# Patient Record
Sex: MALE
[De-identification: names, ages, dates, MRNs, and addresses within clinical notes are randomized per-mention and may not be internally consistent; named-entity substitution may affect disease eponyms.]

---

## 2017-12-27 ENCOUNTER — TRANSCRIPTION ENCOUNTER (OUTPATIENT)
Age: 62
End: 2017-12-27

## 2021-09-09 ENCOUNTER — APPOINTMENT (OUTPATIENT)
Dept: ORTHOPEDIC SURGERY | Facility: CLINIC | Age: 66
End: 2021-09-09

## 2024-11-13 ENCOUNTER — INPATIENT (INPATIENT)
Facility: HOSPITAL | Age: 69
LOS: 21 days | Discharge: OTHER INSTITUTION | DRG: 66 | End: 2024-12-05
Attending: STUDENT IN AN ORGANIZED HEALTH CARE EDUCATION/TRAINING PROGRAM | Admitting: STUDENT IN AN ORGANIZED HEALTH CARE EDUCATION/TRAINING PROGRAM
Payer: MEDICARE

## 2024-11-13 VITALS
OXYGEN SATURATION: 98 % | WEIGHT: 224.87 LBS | HEIGHT: 74 IN | RESPIRATION RATE: 15 BRPM | HEART RATE: 56 BPM | SYSTOLIC BLOOD PRESSURE: 129 MMHG | DIASTOLIC BLOOD PRESSURE: 78 MMHG | TEMPERATURE: 98 F

## 2024-11-13 DIAGNOSIS — Z90.49 ACQUIRED ABSENCE OF OTHER SPECIFIED PARTS OF DIGESTIVE TRACT: Chronic | ICD-10-CM

## 2024-11-13 DIAGNOSIS — Z98.890 OTHER SPECIFIED POSTPROCEDURAL STATES: Chronic | ICD-10-CM

## 2024-11-13 DIAGNOSIS — I63.9 CEREBRAL INFARCTION, UNSPECIFIED: ICD-10-CM

## 2024-11-13 DIAGNOSIS — Z96.649 PRESENCE OF UNSPECIFIED ARTIFICIAL HIP JOINT: Chronic | ICD-10-CM

## 2024-11-13 DIAGNOSIS — Z98.49 CATARACT EXTRACTION STATUS, UNSPECIFIED EYE: Chronic | ICD-10-CM

## 2024-11-13 LAB — SARS-COV-2 RNA SPEC QL NAA+PROBE: SIGNIFICANT CHANGE UP

## 2024-11-13 RX ORDER — AMLODIPINE BESYLATE 10 MG/1
5 TABLET ORAL DAILY
Refills: 0 | Status: DISCONTINUED | OUTPATIENT
Start: 2024-11-14 | End: 2024-11-22

## 2024-11-13 RX ORDER — POLYETHYLENE GLYCOL 3350 17 G/17G
17 POWDER, FOR SOLUTION ORAL
Refills: 0 | Status: DISCONTINUED | OUTPATIENT
Start: 2024-11-13 | End: 2024-11-25

## 2024-11-13 RX ORDER — CLOPIDOGREL 75 MG/1
75 TABLET, FILM COATED ORAL DAILY
Refills: 0 | Status: DISCONTINUED | OUTPATIENT
Start: 2024-11-14 | End: 2024-12-05

## 2024-11-13 RX ORDER — ACETAMINOPHEN, DIPHENHYDRAMINE HCL, PHENYLEPHRINE HCL 325; 25; 5 MG/1; MG/1; MG/1
6 TABLET ORAL AT BEDTIME
Refills: 0 | Status: DISCONTINUED | OUTPATIENT
Start: 2024-11-13 | End: 2024-11-15

## 2024-11-13 RX ORDER — PANTOPRAZOLE SODIUM 40 MG/1
40 TABLET, DELAYED RELEASE ORAL
Refills: 0 | Status: DISCONTINUED | OUTPATIENT
Start: 2024-11-13 | End: 2024-12-05

## 2024-11-13 RX ORDER — POLYETHYLENE GLYCOL 3350 17 G/17G
17 POWDER, FOR SOLUTION ORAL DAILY
Refills: 0 | Status: DISCONTINUED | OUTPATIENT
Start: 2024-11-13 | End: 2024-11-14

## 2024-11-13 RX ORDER — CHOLECALCIFEROL (VITAMIN D3) 10MCG/0.25
1000 DROPS ORAL DAILY
Refills: 0 | Status: DISCONTINUED | OUTPATIENT
Start: 2024-11-13 | End: 2024-12-05

## 2024-11-13 RX ORDER — ALBUTEROL 90 MCG
2 AEROSOL (GRAM) INHALATION EVERY 6 HOURS
Refills: 0 | Status: DISCONTINUED | OUTPATIENT
Start: 2024-11-13 | End: 2024-11-15

## 2024-11-13 RX ORDER — ROSUVASTATIN CALCIUM 5 MG/1
5 TABLET, FILM COATED ORAL AT BEDTIME
Refills: 0 | Status: DISCONTINUED | OUTPATIENT
Start: 2024-11-13 | End: 2024-11-13

## 2024-11-13 RX ORDER — PAROXETINE HYDROCHLORIDE HEMIHYDRATE 37.5 MG/1
30 TABLET, FILM COATED, EXTENDED RELEASE ORAL DAILY
Refills: 0 | Status: DISCONTINUED | OUTPATIENT
Start: 2024-11-14 | End: 2024-12-05

## 2024-11-13 RX ORDER — LOSARTAN POTASSIUM 100 MG/1
50 TABLET, FILM COATED ORAL DAILY
Refills: 0 | Status: DISCONTINUED | OUTPATIENT
Start: 2024-11-14 | End: 2024-11-22

## 2024-11-13 RX ORDER — SENNOSIDES 8.6 MG
2 TABLET ORAL AT BEDTIME
Refills: 0 | Status: DISCONTINUED | OUTPATIENT
Start: 2024-11-13 | End: 2024-11-19

## 2024-11-13 RX ORDER — ACETAMINOPHEN 500MG 500 MG/1
650 TABLET, COATED ORAL EVERY 6 HOURS
Refills: 0 | Status: DISCONTINUED | OUTPATIENT
Start: 2024-11-13 | End: 2024-12-05

## 2024-11-13 RX ORDER — COLCHICINE 0.6 MG
0.6 TABLET ORAL DAILY
Refills: 0 | Status: DISCONTINUED | OUTPATIENT
Start: 2024-11-14 | End: 2024-12-05

## 2024-11-13 RX ORDER — ROSUVASTATIN CALCIUM 5 MG/1
5 TABLET, FILM COATED ORAL DAILY
Refills: 0 | Status: DISCONTINUED | OUTPATIENT
Start: 2024-11-14 | End: 2024-12-05

## 2024-11-13 RX ORDER — PANTOPRAZOLE SODIUM 40 MG/1
40 TABLET, DELAYED RELEASE ORAL
Refills: 0 | Status: DISCONTINUED | OUTPATIENT
Start: 2024-11-13 | End: 2024-11-13

## 2024-11-13 NOTE — H&P ADULT - NSICDXFAMILYHX_GEN_ALL_CORE_FT
FAMILY HISTORY:  Father  Still living? Unknown  FH: arthritis, Age at diagnosis: Age Unknown  FH: cancer, Age at diagnosis: Age Unknown    Mother  Still living? Unknown  FH: arthritis, Age at diagnosis: Age Unknown  FH: cancer, Age at diagnosis: Age Unknown  FH: depression, Age at diagnosis: Age Unknown

## 2024-11-13 NOTE — H&P ADULT - ASSESSMENT
Assessment/Plan:  TARUN CRESPO is a 70 y/o M w/ PMHx HLD, depression, GERD, gout, colonic polyps,  congenital absence of one kidney, recent cholecystitis s/p cholecystectomy (6/24), RHETT on CPAP [facility]inially [facility] with [facility]asia, R sided weakness and R facial droop and vigorous cough x 2 weeks s/p COVID vaccine. 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  S/p L ICA terminus occulusion a/p L ICA thrombectomy, no stent, 5 passes TICI 2C  w/ underling L petrous ICA dissection on 11/3 w/ Dr. March. Now admitted to [facility] for functional deficits for initiation of a multidisciplinary rehab program consisting focused on functional mobility, transfers and ADLs.    #L MCA stroke w/ hemorrhagic conversion  #L petrous ICA dissection w/ pesudoaneurysm  -CTH 11/3: hyperdense appearance of L basal ganglia and L frontal lobe gyri, mild LCA edema 2/2 recent ischemia  -CTA H/N 11/3: no evidence of significant stenosis affecting the extra cranial carotid or vertebral arteries. No evidence of aneurysm or significant vascular stenosis at [place]  -Repeat CTH 11/4: post recent endovascular revascularization of the left internal carotid artery and left middle cerebral artery. Minimally less conspicuous hyperdense appearance of left basal ganglia and left frontal lobe gyri which may represent expected evolution of contrast staining  -MRI brain 11/5: evolving recent infarct in L ICA associated with hemorrhagic transformation of the L basal ganglia. Effacement of cerebral sulci with mass effect causing 0.6 mm rightward shift.   -CTH 11/9: evolving acute to subacute left MCA infarct with evolving left basal ganglia hemorrhagic transformation, new punctuate foci of increased hyperdensity. Stable mass effect and 5mm rightward midline shift  -CTA 11/9: stable left internal carotid artery pseudoaneurysm immediately inferior to the petrous portion of the L ICA at the area of focal dissection.  -S/p L ICA terminus occulusion a/p L ICA thrombectomy, no stent, 5 passes TICI 2C  w/ underling L petrous ICA dissection on 11/3 w/ Dr. March.   -A1c 5.0; LDL 60  -No statin indicated given low LDL  -Aspirin 325mg daily  -Plavix 75mg daily   -F/u outpatient caridology for Zio-patch to r/o cardioembolic source     Deficits: Dysarthria, apraxia of speech, aphasia, right hemiparesis   Comprehensive Multidisciplinary Rehab Program:  - Start comprehensive rehab program, 3 hours a day, 5 days a week.  - PT 1hr/day: Focused on improving strength, endurance, coordination, balance, functional mobility, and transfers  - OT 1hr/day: Focused on improving strength, fine motor skills, coordination, posture and ADLs.    - Speech Therapy 1hr/day: to diagnose and treat deficits in swallowing, cognition and communication.   - Activity Limitations: Decreased social, vocational and leisure activities, decreased self care and ADLs, decreased mobility, decreased ability to manage household and finances.     -----------------------------------------------------------------------------------  Concurrent Medical Problems    #Normocytic anemia (stable) vs Iron deficiency anemia   -Monitor H/H (11.2/34.2- on 11/13)  -F/u outpatient colonoscopy for hx of colon polps  -Transfuse <7 PRN    -Iron studies: Transferrin: 11, TIBC: 170, Iron: 18, Ferritin: 414    #Fevers  #Pneumonitis   -Fevers on 11/5 ; infectious work up negative  -Likely 2/2 chemical pneumonitis from aspiration of ice chips  -Blood cx negative  -MRSA/MSSA- negative  -S/p empiric Rocephin x1  -Monitor CBC, fevers    #RHETT  #URI   #Cough   -CXR- negative   -CPAP HS  -Albuterol PRN  -Continue with o2 NC  -Monitor o2 sats    #Gout  -Colchicine 0.6mg daily  -Uric acid 11/12- 6.8     #Mood/Cognition  #Depression  -Paxil 30mg daily   Neuropsychology consult PRN    #Sleep:   Maintain quiet hours and low stim environment.  Melatonin 6mg HS PRN to maximize participation in therapy during the day.     #Pain Management:  Analgesic: Tylenol PRN    #GI/Bowel:  Senna QHS, Miralax PRN Daily  GI ppx: Pantoprazole 40mg daily     #/Bladder:   - PVR x1 on admission (SC if > 400)    #Skin/Pressure Injury:   - Skin assessment on admission: ***    #Diet/Dysphagia:  Dysphagia: SLP consult for swallow function evaluation and treatment  S/p MBS on 11/12--> diet: Easy to chew; mod thick liquids- DASH  -1:1 feeds   [X] Aspiration Precautions  Nutrition consult   -F/u outpatient ENT    #Precautions / PROPHYLAXIS:   - Falls,   - ortho: Weight bearing status: WBAT   - Lungs: Aspiration, Incentive Spirometer   - DVT ppx: Lovenox 40mg daily, SCDs  - Pressure injury/Skin:  OOB to Chair, PT/OT      ---------------  Code Status: FULL  Emergency Contact:    Outpatient Follow-up (Specialty/Name of physician):    --------------  Goals: Safe discharge to Home*****  Estimated Length of Stay: 10-14 days  Rehab Potential: Good  Medical Prognosis: Good  Estimated Disposition: Home with Home Care  ---------------    PRESCREEN COMPARISON:  I have reviewed the prescreen information and I have found no relevant changes between the preadmission screening and my post admission evaluation.    RATIONALE FOR INPATIENT ADMISSION: Patient demonstrates the following:  [X] Medically appropriate for rehabilitation admission  [X] Has attainable rehab goals with an appropriate initial discharge plan  [X]Has rehabilitation potential (expected to make a significant improvement within a reasonable period of time)  [X] Requires close medical management by a rehab physician, rehab nursing care, Hospitalist and comprehensive interdisciplinary team (including PT, OT and/or SLP, Prosthetics and Orthotics)       Assessment/Plan:  TARUN CRESPO is a 68 y/o M w/ PMHx HLD, depression, GERD, gout, colonic polyps,  congenital absence of one kidney, recent cholecystitis s/p cholecystectomy (6/24), RHETT on CPAP orginially presented to [facility] on 11/3 with aphasia, R sided weakness and R facial droop and vigorous cough x 2 weeks s/p COVID vaccine. 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  S/p L ICA terminus occulusion a/p L ICA thrombectomy, no stent, 5 passes TICI 2C  w/ underling L petrous ICA dissection on 11/3 w/ Dr. March. Now admitted to [facility] for functional deficits for initiation of a multidisciplinary rehab program consisting focused on functional mobility, transfers and ADLs.    #L MCA stroke w/ hemorrhagic conversion  #L petrous ICA dissection w/ pesudoaneurysm  -CTH 11/3: hyperdense appearance of L basal ganglia and L frontal lobe gyri, mild LCA edema 2/2 recent ischemia  -CTA H/N 11/3: no evidence of significant stenosis affecting the extra cranial carotid or vertebral arteries. No evidence of aneurysm or significant vascular stenosis at [place]  -Repeat CTH 11/4: post recent endovascular revascularization of the left internal carotid artery and left middle cerebral artery. Minimally less conspicuous hyperdense appearance of left basal ganglia and left frontal lobe gyri which may represent expected evolution of contrast staining  -MRI brain 11/5: evolving recent infarct in L ICA associated with hemorrhagic transformation of the L basal ganglia. Effacement of cerebral sulci with mass effect causing 0.6 mm rightward shift.   -CTH 11/9: evolving acute to subacute left MCA infarct with evolving left basal ganglia hemorrhagic transformation, new punctuate foci of increased hyperdensity. Stable mass effect and 5mm rightward midline shift  -CTA 11/9: stable left internal carotid artery pseudoaneurysm immediately inferior to the petrous portion of the L ICA at the area of focal dissection.  -S/p L ICA terminus occulusion a/p L ICA thrombectomy, no stent, 5 passes TICI 2C  w/ underling L petrous ICA dissection on 11/3 w/ Dr. March.   -A1c 5.0; LDL 60  -No statin indicated given low LDL  -Aspirin 325mg daily  -Plavix 75mg daily   rosuvastatin 5mg QD  -Will be on DAPT for 3 months and follow up with outpt neurology  -F/u outpatient caridology for Zio-patch to r/o cardioembolic source --ILR to be considered as outpatient  -CTH reports with screen grabs in chart; CD imaging from [place] in chart and will need to uploaded to [facility]    Deficits: Dysarthria, apraxia of speech, aphasia, right hemiparesis   Comprehensive Multidisciplinary Rehab Program:  - Start comprehensive rehab program, 3 hours a day, 5 days a week.  - PT 1hr/day: Focused on improving strength, endurance, coordination, balance, functional mobility, and transfers  - OT 1hr/day: Focused on improving strength, fine motor skills, coordination, posture and ADLs.    - Speech Therapy 1hr/day: to diagnose and treat deficits in swallowing, cognition and communication.   - Activity Limitations: Decreased social, vocational and leisure activities, decreased self care and ADLs, decreased mobility, decreased ability to manage household and finances.   -Right resting hand splint 4hrs on/off during the day and on at night as tolerated    -----------------------------------------------------------------------------------  Concurrent Medical Problems    #Normocytic anemia (stable) vs Iron deficiency anemia   -Monitor H/H (11.2/34.2- on 11/13)  -F/u outpatient colonoscopy for hx of colon polps  -Transfuse <7 PRN    -Iron studies: Transferrin: 11, TIBC: 170, Iron: 18, Ferritin: 414    #Fevers  #Pneumonitis   -Fevers on 11/5 ; infectious work up negative  -Likely 2/2 chemical pneumonitis from aspiration of ice chips  -Blood cx negative  -MRSA/MSSA- negative  -S/p empiric Rocephin x1  -Monitor CBC, fevers    #RHETT  #URI   #Cough   -CXR- negative   -CPAP HS --unknown settings will need to confirm with wife  -Albuterol PRN  -Continue with o2 NC  -Monitor o2 sats    #Gout  -Colchicine 0.6mg daily  -Uric acid 11/12- 6.8     #Mood/Cognition  #Depression  -Paxil 30mg daily   Neuropsychology consult PRN    #Sleep:   Maintain quiet hours and low stim environment.  Melatonin 6mg HS PRN to maximize participation in therapy during the day.     #Pain Management:  Analgesic: Tylenol PRN    #GI/Bowel:  -Senna QHS  -Miralax BIDD  - Bisacodyl suppository QD PRN  GI ppx: Pantoprazole 40mg daily     #/Bladder:   - PVR x1 on admission (SC if > 400)    #Skin/Pressure Injury:   - Skin assessment on admission: intact    #Diet/Dysphagia:  Dysphagia: SLP consult for swallow function evaluation and treatment  S/p MBS on 11/12--> diet: Easy to chew; mild thick liquids- DASH  -1:1 feeds   [X] Aspiration Precautions  Nutrition consult   -F/u outpatient ENT    #Precautions / PROPHYLAXIS:   - Falls,   - Lungs: Aspiration, Incentive Spirometer   - DVT ppx: DAPT  - Pressure injury/Skin:  OOB to Chair, PT/OT      ---------------  Code Status: FULL  Emergency Contact:    Outpatient Follow-up (Specialty/Name of physician):  Dr. Cheng March  Neurology  12/24/24 appt     Cardiology    ENT  [phone]  --------------  Goals: Safe discharge to Home*****  Estimated Length of Stay: 10-14 days  Rehab Potential: Good  Medical Prognosis: Good  Estimated Disposition: Home with Home Care  ---------------    PRESCREEN COMPARISON:  I have reviewed the prescreen information and I have found no relevant changes between the preadmission screening and my post admission evaluation.    RATIONALE FOR INPATIENT ADMISSION: Patient demonstrates the following:  [X] Medically appropriate for rehabilitation admission  [X] Has attainable rehab goals with an appropriate initial discharge plan  [X]Has rehabilitation potential (expected to make a significant improvement within a reasonable period of time)  [X] Requires close medical management by a rehab physician, rehab nursing care, Hospitalist and comprehensive interdisciplinary team (including PT, OT and/or SLP, Prosthetics and Orthotics)       Assessment/Plan:  TARUN CRESPO is a 70 y/o M w/ PMHx HLD, depression, GERD, gout, colonic polyps,  congenital absence of one kidney, recent cholecystitis s/p cholecystectomy (6/24), RHETT on CPAP orginially presented to [facility] on 11/3 with aphasia, R sided weakness and R facial droop and vigorous cough x 2 weeks s/p COVID vaccine. 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  S/p L ICA terminus occulusion a/p L ICA thrombectomy, no stent, 5 passes TICI 2C  w/ underling L petrous ICA dissection on 11/3 w/ Dr. March. Now admitted to [facility] for functional deficits for initiation of a multidisciplinary rehab program consisting focused on functional mobility, transfers and ADLs.    #L MCA stroke w/ hemorrhagic conversion  #L petrous ICA dissection w/ pesudoaneurysm  -CTH 11/3: hyperdense appearance of L basal ganglia and L frontal lobe gyri, mild LCA edema 2/2 recent ischemia  -CTA H/N 11/3: no evidence of significant stenosis affecting the extra cranial carotid or vertebral arteries. No evidence of aneurysm or significant vascular stenosis at [place]  -Repeat CTH 11/4: post recent endovascular revascularization of the left internal carotid artery and left middle cerebral artery. Minimally less conspicuous hyperdense appearance of left basal ganglia and left frontal lobe gyri which may represent expected evolution of contrast staining  -MRI brain 11/5: evolving recent infarct in L ICA associated with hemorrhagic transformation of the L basal ganglia. Effacement of cerebral sulci with mass effect causing 0.6 mm rightward shift.   -CTH 11/9: evolving acute to subacute left MCA infarct with evolving left basal ganglia hemorrhagic transformation, new punctuate foci of increased hyperdensity. Stable mass effect and 5mm rightward midline shift  -CTA 11/9: stable left internal carotid artery pseudoaneurysm immediately inferior to the petrous portion of the L ICA at the area of focal dissection.  -S/p L ICA terminus occulusion a/p L ICA thrombectomy, no stent, 5 passes TICI 2C  w/ underling L petrous ICA dissection on 11/3 w/ Dr. March.   -A1c 5.0; LDL 60  -No statin indicated given low LDL  -Aspirin 325mg daily  -Plavix 75mg daily   rosuvastatin 5mg QD  -Will be on DAPT for 3 months and follow up with outpt neurology  -F/u outpatient caridology for Zio-patch to r/o cardioembolic source --ILR to be considered as outpatient  -CTH reports with screen grabs in chart; CD imaging from [place] in chart and will need to uploaded to [facility]    Deficits: Dysarthria, apraxia of speech, aphasia, right hemiparesis   Comprehensive Multidisciplinary Rehab Program:  - Start comprehensive rehab program, 3 hours a day, 5 days a week.  - PT 1hr/day: Focused on improving strength, endurance, coordination, balance, functional mobility, and transfers  - OT 1hr/day: Focused on improving strength, fine motor skills, coordination, posture and ADLs.    - Speech Therapy 1hr/day: to diagnose and treat deficits in swallowing, cognition and communication.   - Activity Limitations: Decreased social, vocational and leisure activities, decreased self care and ADLs, decreased mobility, decreased ability to manage household and finances.   -Right resting hand splint 4hrs on/off during the day and on at night as tolerated    -----------------------------------------------------------------------------------  Concurrent Medical Problems    #Normocytic anemia (stable) vs Iron deficiency anemia   -Monitor H/H (11.2/34.2- on 11/13)  -F/u outpatient colonoscopy for hx of colon polps  -Transfuse <7 PRN    -Iron studies: Transferrin: 11, TIBC: 170, Iron: 18, Ferritin: 414    #Fevers  #Pneumonitis   -Fevers on 11/5 ; infectious work up negative  -Likely 2/2 chemical pneumonitis from aspiration of ice chips  -Blood cx negative  -MRSA/MSSA- negative  -S/p empiric Rocephin x1  -Monitor CBC, fevers    #RHETT  #URI   #Cough   -CXR- negative   -CPAP HS --unknown settings will need to confirm with wife  -Albuterol PRN  -On Flovent at home (not available here)  -Continue with o2 NC  -Monitor o2 sats    #Gout  -Colchicine 0.6mg daily  -Uric acid 11/12- 6.8     #Mood/Cognition  #Depression  -Paxil 30mg daily   Neuropsychology consult PRN    #Sleep:   Maintain quiet hours and low stim environment.  Melatonin 6mg HS PRN to maximize participation in therapy during the day.     #Pain Management:  Analgesic: Tylenol PRN    #GI/Bowel:  -Senna QHS  -Miralax BIDD  - Bisacodyl suppository QD PRN  GI ppx: Pantoprazole 40mg daily     #/Bladder:   - PVR x1 on admission (SC if > 400)    #Skin/Pressure Injury:   - Skin assessment on admission: intact    #Diet/Dysphagia:  Dysphagia: SLP consult for swallow function evaluation and treatment  S/p MBS on 11/12--> diet: Easy to chew; mild thick liquids- DASH  -1:1 feeds   [X] Aspiration Precautions  Nutrition consult   -F/u outpatient ENT    #Precautions / PROPHYLAXIS:   - Falls,   - Lungs: Aspiration, Incentive Spirometer   - DVT ppx: DAPT  - Pressure injury/Skin:  OOB to Chair, PT/OT      ---------------  Code Status: FULL  Emergency Contact:    Outpatient Follow-up (Specialty/Name of physician):  Dr. Cheng March  Neurology  12/24/24 appt     Cardiology    ENT  [phone]  --------------  Goals: Safe discharge to Home*****  Estimated Length of Stay: 10-14 days  Rehab Potential: Good  Medical Prognosis: Good  Estimated Disposition: Home with Home Care  ---------------    PRESCREEN COMPARISON:  I have reviewed the prescreen information and I have found no relevant changes between the preadmission screening and my post admission evaluation.    RATIONALE FOR INPATIENT ADMISSION: Patient demonstrates the following:  [X] Medically appropriate for rehabilitation admission  [X] Has attainable rehab goals with an appropriate initial discharge plan  [X]Has rehabilitation potential (expected to make a significant improvement within a reasonable period of time)  [X] Requires close medical management by a rehab physician, rehab nursing care, Hospitalist and comprehensive interdisciplinary team (including PT, OT and/or SLP, Prosthetics and Orthotics)       Assessment/Plan:  TARUN CRESPO is a 70 y/o M w/ PMHx HLD, depression, GERD, gout, colonic polyps,  congenital absence of one kidney, recent cholecystitis s/p cholecystectomy (6/24), RHETT on CPAP orginially presented to [facility] on 11/3 with aphasia, R sided weakness and R facial droop and vigorous cough x 2 weeks s/p COVID vaccine. 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  S/p L ICA terminus occulusion a/p L ICA thrombectomy, no stent, 5 passes TICI 2C  w/ underling L petrous ICA dissection on 11/3 w/ Dr. March. Now admitted to [facility] for functional deficits for initiation of a multidisciplinary rehab program with right hemiparesis, non-fluent aphasia, sensory impairment, dysphagia, and impaired functional mobility, transfers and ADLs.    #L MCA stroke w/ hemorrhagic conversion  #L petrous ICA dissection w/ pesudoaneurysm  -CTH 11/3: hyperdense appearance of L basal ganglia and L frontal lobe gyri, mild LCA edema 2/2 recent ischemia  -CTA H/N 11/3: no evidence of significant stenosis affecting the extra cranial carotid or vertebral arteries. No evidence of aneurysm or significant vascular stenosis at [place]  -Repeat CTH 11/4: post recent endovascular revascularization of the left internal carotid artery and left middle cerebral artery. Minimally less conspicuous hyperdense appearance of left basal ganglia and left frontal lobe gyri which may represent expected evolution of contrast staining  -MRI brain 11/5: evolving recent infarct in L ICA associated with hemorrhagic transformation of the L basal ganglia. Effacement of cerebral sulci with mass effect causing 0.6 mm rightward shift.   -CTH 11/9: evolving acute to subacute left MCA infarct with evolving left basal ganglia hemorrhagic transformation, new punctuate foci of increased hyperdensity. Stable mass effect and 5mm rightward midline shift  -CTA 11/9: stable left internal carotid artery pseudoaneurysm immediately inferior to the petrous portion of the L ICA at the area of focal dissection.  -S/p L ICA terminus occulusion a/p L ICA thrombectomy, no stent, 5 passes TICI 2C  w/ underling L petrous ICA dissection on 11/3 w/ Dr. March.   -A1c 5.0; LDL 60  -No statin indicated given low LDL  -Aspirin 325mg daily  -Plavix 75mg daily   rosuvastatin 5mg QD  -Will be on DAPT for 3 months and follow up with outpt neurology  -F/u outpatient caridology for Zio-patch to r/o cardioembolic source --ILR to be considered as outpatient  -CTH reports with screen grabs in chart; CD imaging from [place] in chart and will need to uploaded to [facility]    Deficits: Dysarthria, apraxia of speech, aphasia, right hemiparesis   Comprehensive Multidisciplinary Rehab Program:  - Start comprehensive rehab program, 3 hours a day, 5 days a week.  - PT 1hr/day: Focused on improving strength, endurance, coordination, balance, functional mobility, and transfers  - OT 1hr/day: Focused on improving strength, fine motor skills, coordination, posture and ADLs.    - Speech Therapy 1hr/day: to diagnose and treat deficits in swallowing, cognition and communication.   - Activity Limitations: Decreased social, vocational and leisure activities, decreased self care and ADLs, decreased mobility, decreased ability to manage household and finances.   -Right resting hand splint 4hrs on/off during the day and on at night as tolerated    -----------------------------------------------------------------------------------  Concurrent Medical Problems    #Normocytic anemia (stable) vs Iron deficiency anemia   -Monitor H/H (11.2/34.2- on 11/13)  -F/u outpatient colonoscopy for hx of colon polps  -Transfuse <7 PRN    -Iron studies: Transferrin: 11, TIBC: 170, Iron: 18, Ferritin: 414    #Fevers  #Pneumonitis   -Fevers on 11/5 ; infectious work up negative  -Likely 2/2 chemical pneumonitis from aspiration of ice chips  -Blood cx negative  -MRSA/MSSA- negative  -S/p empiric Rocephin x1  -Monitor CBC, fevers    #RHETT  #URI   #Cough   -CXR- negative   -CPAP HS --unknown settings will need to confirm with wife  -Albuterol PRN  -On Flovent at home (not available here)  -Continue with o2 NC  -Monitor o2 sats    #Gout  -Colchicine 0.6mg daily  -Uric acid 11/12- 6.8     #Mood/Cognition  #Depression  -Paxil 30mg daily   Neuropsychology consult PRN    #Sleep:   Maintain quiet hours and low stim environment.  Melatonin 6mg HS PRN to maximize participation in therapy during the day.     #Pain Management:  Analgesic: Tylenol PRN    #GI/Bowel:  -Senna QHS  -Miralax BIDD  - Bisacodyl suppository QD PRN  GI ppx: Pantoprazole 40mg daily     #/Bladder:   - PVR x1 on admission (SC if > 400)    #Skin/Pressure Injury:   - Skin assessment on admission: intact    #Dysphagia:  Dysphagia: SLP consult for swallow function evaluation and treatment  S/p MBS on 11/12--> diet: Easy to chew; mild thick liquids- DASH  -1:1 feeds   [X] Aspiration Precautions  Nutrition consult   -F/u outpatient ENT    #Precautions / PROPHYLAXIS:   - Falls,   - Lungs: Aspiration, Incentive Spirometer   - DVT ppx: DAPT  - Pressure injury/Skin:  OOB to Chair, PT/OT      ---------------  Code Status: FULL  Emergency Contact:    Outpatient Follow-up (Specialty/Name of physician):  Dr. Cheng March  Neurology  12/24/24 appt     Cardiology    ENT  [phone]  --------------  Goals: Safe discharge to Home vs. TREV  Estimated Length of Stay:  21 days  Rehab Potential: Good  Medical Prognosis: Good  Estimated Disposition: Home with Home Care  ---------------    PRESCREEN COMPARISON:  I have reviewed the prescreen information and I have found no relevant changes between the preadmission screening and my post admission evaluation.    RATIONALE FOR INPATIENT ADMISSION: Patient demonstrates the following:  [X] Medically appropriate for rehabilitation admission  [X] Has attainable rehab goals with an appropriate initial discharge plan  [X]Has rehabilitation potential (expected to make a significant improvement within a reasonable period of time)  [X] Requires close medical management by a rehab physician, rehab nursing care, Hospitalist and comprehensive interdisciplinary team (including PT, OT and/or SLP, Prosthetics and Orthotics)

## 2024-11-13 NOTE — H&P ADULT - NS ATTEND AMEND GEN_ALL_CORE FT
Pt. seen with NP 11/14 AM.  Agree with documentation above as per NP and resident on call with amendments made as appropriate. Patient medically stable. Appropriate for acute rehabilitation.    Seen this AM.  Pt. c/o SOB-- better with O2.  limited by aphasia but can use gestures for yes/no.  Indicates he used O2  at home.  Pt. slept during the night as per nursing.  No void. was SC for 800cc this AM.  Pt. says no when asked if he feels urge to urinate.     Vital Signs Last 24 Hrs  T(C): 36.8 (14 Nov 2024 07:41), Max: 36.8 (13 Nov 2024 21:34)  T(F): 98.2 (14 Nov 2024 07:41), Max: 98.3 (13 Nov 2024 21:34)  HR: 54 (14 Nov 2024 07:41) (54 - 60)  BP: 125/79 (14 Nov 2024 07:41) (125/79 - 129/78)  BP(mean): --  RR: 14 (14 Nov 2024 07:41) (14 - 15)  SpO2: 95% (14 Nov 2024 07:41) (95% - 98%)    Parameters below as of 14 Nov 2024 07:41  Patient On (Oxygen Delivery Method): room air    Physical Exam as amended above                          12.2   5.26  )-----------( 279      ( 14 Nov 2024 06:23 )             37.2   11-14    140  |  104  |  21  ----------------------------<  97  4.0   |  26  |  0.96    Ca    8.9      14 Nov 2024 06:23    TPro  6.8  /  Alb  2.7[L]  /  TBili  0.5  /  DBili  x   /  AST  37  /  ALT  29  /  AlkPhos  51  11-14        68 y/o M w/ PMHx HLD, depression, GERD, gout, colonic polyps,  congenital absence of one kidney, recent cholecystitis s/p cholecystectomy (6/24), RHETT on CPAP orginially presented to [facility] on 11/3 with aphasia, R sided weakness and R facial droop and vigorous cough x 2 weeks s/p COVID vaccine. 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  S/p L ICA terminus occulusion a/p L ICA thrombectomy, no stent, 5 passes TICI 2C  w/ underling L petrous ICA dissection on 11/3 w/ Dr. March. Now admitted to [facility] for functional deficits for initiation of a multidisciplinary rehab program with right hemiparesis, non-fluent aphasia, sensory impairment, dysphagia, and impaired functional mobility, transfers and ADLs.    Imaging from [facility] will be uploaded to PACs today and reviewed    -- Retention.  Last BM 11/13  Monitor for voiding.  Check PVRs-- SC > 400cc.  If little to no void in 6 hour period, check bladder scan PRN and SC if > 400cc    Check bilateral LE doppler to r/o DVT-- pt. at risk due to Hemiparesis and immobility    Pt. not on AC due to hemorrhagic conversion as per [facility]-- will review imaging -- if has been stable for 48-h at least no contraindication to chemical DVT ppx.

## 2024-11-13 NOTE — H&P ADULT - NSHPPHYSICALEXAM_GEN_ALL_CORE
Constitutional - NAD, Comfortable   HEENT - NCAT, EOMI   Neck - Supple, No limited ROM  Chest - good chest expansion, good respiratory effort, CTAB    Cardio - warm and well perfused, RRR, no murmur   Abdomen -  Soft, NTND   Extremities - No peripheral edema, No calf tenderness      Neurologic Exam:                       Cognitive -              Orientation: Awake, Alert, Orientation limited due to aphasia            Attention:  limited due to aphasia; can follow simple 1 step commands; 2 step commands with delayed processing            Memory: unable to test due to aphasia     Speech – + Aphasia--Nonfluent, Impaired multiple step command following, impaired repetition; + dysarthria,       Cranial Nerves – PERRLA , EOMI, + facial asymmetry; facial sensation impaired on right, + dysarthria, + dysphagia, Shoulder shrug impaired        Motor -                        LEFT    UE - ShAB 5/5, EF 5/5, EE 5/5, WE 5/5,  5/5                     RIGHT UE - ShAB 0/5, EF 0/5, EE 0/5, WE 0/5,  0/5                     LEFT    LE - HF 5/5, KE 5/5, DF 5/5, PF 5/5                     RIGHT LE - HF 1/5, KE 1/5, DF 0/5, PF 0/5        Coordination - FTN/HTS intact on the left; unable to assess due to weaknes son the right     Sensory – Impaired to LT  on right side;       Reflexes - DTR  2/ 4 , neg Soria's b/l,  Tone: Right EF MAS 1; Right EE MAS 1; Right WF 1    Psychiatric - Mood stable, Affect WNL   Skin on admission: intact Constitutional - NAD, Comfortable   HEENT - NCAT, EOMI   Neck - Supple, No limited ROM  Chest - good chest expansion, good respiratory effort, CTAB    Cardio - warm and well perfused, RRR, no murmur   Abdomen -  Soft, NTND   Extremities - No peripheral edema, No calf tenderness      Neurologic Exam:                       Cognitive -              Orientation: Awake, Alert, Orientation limited due to aphasia            Attention:  limited due to aphasia; can follow simple 1 step commands; 2 step commands with delayed processing            Memory: unable to test due to aphasia     Speech – + Aphasia--Nonfluent, Impaired multiple step command following, but able to follow all simple commands, impaired repetition; + dysarthria,       Cranial Nerves – PERRLA , EOMI, + left facial weakness; facial sensation impaired on right, + dysarthria, + dysphagia, Shoulder shrug impaired        Motor -                        LEFT    UE - ShAB 5/5, EF 5/5, EE 5/5, WE 5/5,  5/5                     RIGHT UE - ShAB 0/5, EF 0/5, EE 0/5, WE 0/5,  0/5                     LEFT    LE - HF 5/5, KE 5/5, DF 5/5, PF 5/5                     RIGHT LE - HF 0/5, KE 0/5, DF 0/5, PF 0/5        Coordination - FTN/HTS intact on the left; unable to assess due to weaknes son the right     Sensory – Impaired to LT  on right side;-- withdraws to pain Left LE but no response left UE     Reflexes - DTR  2/ 4 , neg Soria's b/l,  Tone: no increased tone    MSK: right shoulder PROM WNL, +sublux  Psychiatric - Mood stable, Affect WNL   Skin on admission: intact

## 2024-11-13 NOTE — PATIENT PROFILE ADULT - NSPROGENSOURCEINFO_GEN_A_NUR
patient unable to respond due to aphasia/patient/facility verbal report/health record/unable to respond

## 2024-11-13 NOTE — H&P ADULT - HISTORY OF PRESENT ILLNESS
68 y/o M w/ PMHx HLD, depression, GERD, gout, colonic polyps,  congenital absence of one kidney, recent cholecystitis s/p cholecystectomy (6/24), RHETT on CPAP orginially presented to [facility] on 11/3 with aphasia, R sided weakness and R facial droop and vigorous cough x 2 weeks s/p COVID vaccine. LKW 2045 on 11/2. 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 Patient transferred to [facility] for mechanical thrombectomy. S/p L ICA terminus occulusion a/p L ICA thrombectomy, no stent, 5 passes TICI 2C  w/ underling L petrous ICA dissection on 11/3 w/ Dr. March. Post op course c/b slight pseudoaneurysm enlargement w/ dissection distal embolisim into the terminal ICA seen on 11/4 CTA and hemorrhagic transformation of L MCA infarct. Of note, hospital course also significant for fevers, suspect aspiration pneumonitis treated with Rocephin x 1, and dysphagia requiring easy to chew,  with mod thick liquids. EP was consulted with concerns of cardioembolic source for infarct, recommended to f/u outpatient for Zio patch. Patient optimized and was evaluated by PM&R and therapy for functional deficits, gait/ADL impairments and acute rehabilitation was recommended. Patient was cleared for discharge to [facility]U on 11/13/24.  68 y/o M w/ PMHx HLD, depression, GERD, gout, colonic polyps,  congenital absence of one kidney, recent cholecystitis s/p cholecystectomy (6/24), RHETT on CPAP orginially presented to [facility] on 11/3 with aphasia, R sided weakness and R facial droop and vigorous cough x 2 weeks s/p COVID vaccine. LKW 2045 on 11/2. 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 [place] [facility] [place]. S/p L ICA terminus occulusion a/p L ICA thrombectomy, no stent, 5 passes TICI 2C  w/ underling L petrous ICA dissection on 11/3 w/ Dr. March. Post op course c/b slight pseudoaneurysm enlargement w/ dissection distal embolisim into the terminal ICA seen on 11/4 CTA and hemorrhagic transformation of L MCA infarct.   Of note, hospital course also significant for fevers, suspect aspiration pneumonitis treated with Rocephin x 1, and dysphagia requiring easy to chew,  with mod thick liquids. EP was consulted with concerns of cardioembolic source for infarct, recommended to f/u outpatient for Zio patch.   Patient had an MBS on 11/12 which showed moderate oropharyngeal dysphagia, remarkable for delayed/reduced laryngeal vestibular closure, pharyngeal trigger delay and reduced hyolaryngeal elevation/excursion.  There was deep laryngeal penetration and possibly aspiration of thin liquid.  Patient was cleared for easy to chew GI diet with mildly thick liquids.    Patient optimized and was evaluated by PM&R and therapy for functional deficits, gait/ADL impairments and acute rehabilitation was recommended. Patient was cleared for discharge to [facility] IRU on 11/13/24.

## 2024-11-13 NOTE — H&P ADULT - NSICDXPASTMEDICALHX_GEN_ALL_CORE_FT
PAST MEDICAL HISTORY:  Colon polyps     GERD (gastroesophageal reflux disease)     Gout     Hypercholesteremia     Major depression     RHETT on CPAP

## 2024-11-13 NOTE — H&P ADULT - NSHPSOCIALHISTORY_GEN_ALL_CORE
SOCIAL HISTORY  Smoking - Denies  EtOH - Denies  Drugs - Denies    FUNCTIONAL HISTORY  Lives with wife, in a private condo, 3-4 steps to elevator. PTA, independent without AD.     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 PTA, independent without AD.     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     FUNCTIONAL HISTORY  Lives with wife, in a private condo, 3-4 steps to elevator. PTA, independent without AD.     CURRENT FUNCTIONAL STATUS  - Bed Mobility: Mod A  - Transfers: Mod/Max; 2PA  - Gait:    - ADLs:  -Upper Body Dressing: Max A  -Lower Body Dressing: Max A  -Grooming: Min A

## 2024-11-13 NOTE — PATIENT PROFILE ADULT - FALL HARM RISK - HARM RISK INTERVENTIONS

## 2024-11-13 NOTE — H&P ADULT - NSHPLABSRESULTS_GEN_ALL_CORE
Labs: 11/11  WBC: 6.2  RBC 3.64  HGB: 11.3  HCT: 33.7  MCV: 92.6  MCHC: 33.5  MCHGB: 31.0  RDW: 12.2  PLTS: 229  MPV: 9.9     11/13  WBC: 6.2  HGB: 11.2  HCT: 34.2  PLTS: 263  Transferrin: 11  TIBC: 170  Iron: 18  Ferritin: 414    BM: 11/11  Glucose: 109  Na: 140  K: 4.4  Chlor: 106  CO2: 26.8  BUN: 24  Creat: 0.82    Lipid profile: 11/4  Cholesterol: 134  Triglycerides 175  HDL: 39  LDL: 60     Hgb A1c 5.0 11/4  TSH: 2.310 6/12    Radiology:   11/4 TTE - limited study, probable normal LA and LV function    11/3 EKG - Sinus hayder w/ RBBB; repeat 11/4- NSR w/ RBBB    CTH 11/3: hyperdense appearance of L basal ganglia and L frontal lobe gyri, mild LCA edema 2/2 recent ischemia  CTA H/N 11/3: no evidence of significant stenosis affecting the extra cranial carotid or vertebral arteries. No evidence of aneurysm or significant vascular stenosis at [place] of [place]  Repeat CTH 11/4: post recent endovascular revascularization of the left internal carotid artery and left middle cerebral artery. Minimally less conspicuous hyperdense appearance of left basal ganglia and left frontal lobe gyri which may represent expected evolution of contrast staining  MRI brain 11/5: evolving recent infarct in L ICA associated with hemorrhagic transformation of the L basal ganglia. Effacement of cerebral sulci with mass effect causing 0.6 mm rightward shift.   CTH 11/9: evolving acute to subacute left MCA infarct with evolving left basal ganglia hemorrhagic transformation, new punctuate foci of increased hyperdensity. Stable mass effect and 5mm rightward midline shift  CTA 11/9: stable left internal carotid artery pseudoaneurysm immediately inferior to the petrous portion of the L ICA at the area of focal dissection.

## 2024-11-13 NOTE — PATIENT PROFILE ADULT - VISION (WITH CORRECTIVE LENSES IF THE PATIENT USUALLY WEARS THEM):
patient unable to respond due to aphasia/Normal vision: sees adequately in most situations; can see medication labels, newsprint

## 2024-11-13 NOTE — H&P ADULT - NSHPREVIEWOFSYSTEMS_GEN_ALL_CORE
REVIEW OF SYSTEMS  limited by neurological deficits  (-) HA, CP, SOB, abdominal pain, diarrhea, constipation  (+) cough

## 2024-11-13 NOTE — H&P ADULT - NSICDXPASTSURGICALHX_GEN_ALL_CORE_FT
PAST SURGICAL HISTORY:  H/O total hip arthroplasty     S/P cataract surgery     S/P cholecystectomy     S/P skin cancer resection

## 2024-11-14 LAB
ALBUMIN SERPL ELPH-MCNC: 2.7 G/DL — LOW (ref 3.3–5)
ALP SERPL-CCNC: 51 U/L — SIGNIFICANT CHANGE UP (ref 40–120)
ALT FLD-CCNC: 29 U/L — SIGNIFICANT CHANGE UP (ref 10–45)
ANION GAP SERPL CALC-SCNC: 10 MMOL/L — SIGNIFICANT CHANGE UP (ref 5–17)
AST SERPL-CCNC: 37 U/L — SIGNIFICANT CHANGE UP (ref 10–40)
BASOPHILS # BLD AUTO: 0.04 K/UL — SIGNIFICANT CHANGE UP (ref 0–0.2)
BASOPHILS NFR BLD AUTO: 0.8 % — SIGNIFICANT CHANGE UP (ref 0–2)
BILIRUB SERPL-MCNC: 0.5 MG/DL — SIGNIFICANT CHANGE UP (ref 0.2–1.2)
BUN SERPL-MCNC: 21 MG/DL — SIGNIFICANT CHANGE UP (ref 7–23)
CALCIUM SERPL-MCNC: 8.9 MG/DL — SIGNIFICANT CHANGE UP (ref 8.4–10.5)
CHLORIDE SERPL-SCNC: 104 MMOL/L — SIGNIFICANT CHANGE UP (ref 96–108)
CO2 SERPL-SCNC: 26 MMOL/L — SIGNIFICANT CHANGE UP (ref 22–31)
CREAT SERPL-MCNC: 0.96 MG/DL — SIGNIFICANT CHANGE UP (ref 0.5–1.3)
EGFR: 84 ML/MIN/1.73M2 — SIGNIFICANT CHANGE UP
EOSINOPHIL # BLD AUTO: 0.17 K/UL — SIGNIFICANT CHANGE UP (ref 0–0.5)
EOSINOPHIL NFR BLD AUTO: 3.2 % — SIGNIFICANT CHANGE UP (ref 0–6)
GLUCOSE SERPL-MCNC: 97 MG/DL — SIGNIFICANT CHANGE UP (ref 70–99)
HCT VFR BLD CALC: 37.2 % — LOW (ref 39–50)
HGB BLD-MCNC: 12.2 G/DL — LOW (ref 13–17)
IMM GRANULOCYTES NFR BLD AUTO: 0.6 % — SIGNIFICANT CHANGE UP (ref 0–0.9)
LYMPHOCYTES # BLD AUTO: 1.46 K/UL — SIGNIFICANT CHANGE UP (ref 1–3.3)
LYMPHOCYTES # BLD AUTO: 27.8 % — SIGNIFICANT CHANGE UP (ref 13–44)
MCHC RBC-ENTMCNC: 30.6 PG — SIGNIFICANT CHANGE UP (ref 27–34)
MCHC RBC-ENTMCNC: 32.8 G/DL — SIGNIFICANT CHANGE UP (ref 32–36)
MCV RBC AUTO: 93.2 FL — SIGNIFICANT CHANGE UP (ref 80–100)
MONOCYTES # BLD AUTO: 0.57 K/UL — SIGNIFICANT CHANGE UP (ref 0–0.9)
MONOCYTES NFR BLD AUTO: 10.8 % — SIGNIFICANT CHANGE UP (ref 2–14)
NEUTROPHILS # BLD AUTO: 2.99 K/UL — SIGNIFICANT CHANGE UP (ref 1.8–7.4)
NEUTROPHILS NFR BLD AUTO: 56.8 % — SIGNIFICANT CHANGE UP (ref 43–77)
NRBC # BLD: 0 /100 WBCS — SIGNIFICANT CHANGE UP (ref 0–0)
PLATELET # BLD AUTO: 279 K/UL — SIGNIFICANT CHANGE UP (ref 150–400)
POTASSIUM SERPL-MCNC: 4 MMOL/L — SIGNIFICANT CHANGE UP (ref 3.5–5.3)
POTASSIUM SERPL-SCNC: 4 MMOL/L — SIGNIFICANT CHANGE UP (ref 3.5–5.3)
PROT SERPL-MCNC: 6.8 G/DL — SIGNIFICANT CHANGE UP (ref 6–8.3)
RBC # BLD: 3.99 M/UL — LOW (ref 4.2–5.8)
RBC # FLD: 12 % — SIGNIFICANT CHANGE UP (ref 10.3–14.5)
SODIUM SERPL-SCNC: 140 MMOL/L — SIGNIFICANT CHANGE UP (ref 135–145)
WBC # BLD: 5.26 K/UL — SIGNIFICANT CHANGE UP (ref 3.8–10.5)
WBC # FLD AUTO: 5.26 K/UL — SIGNIFICANT CHANGE UP (ref 3.8–10.5)

## 2024-11-14 PROCEDURE — 71045 X-RAY EXAM CHEST 1 VIEW: CPT | Mod: 26

## 2024-11-14 PROCEDURE — 70450 CT HEAD/BRAIN W/O DYE: CPT | Mod: 26

## 2024-11-14 PROCEDURE — 99222 1ST HOSP IP/OBS MODERATE 55: CPT

## 2024-11-14 PROCEDURE — 93970 EXTREMITY STUDY: CPT | Mod: 26

## 2024-11-14 PROCEDURE — 93010 ELECTROCARDIOGRAM REPORT: CPT

## 2024-11-14 PROCEDURE — 90832 PSYTX W PT 30 MINUTES: CPT

## 2024-11-14 RX ADMIN — ACETAMINOPHEN 500MG 650 MILLIGRAM(S): 500 TABLET, COATED ORAL at 07:14

## 2024-11-14 RX ADMIN — LOSARTAN POTASSIUM 50 MILLIGRAM(S): 100 TABLET, FILM COATED ORAL at 05:07

## 2024-11-14 RX ADMIN — POLYETHYLENE GLYCOL 3350 17 GRAM(S): 17 POWDER, FOR SOLUTION ORAL at 05:07

## 2024-11-14 RX ADMIN — PAROXETINE HYDROCHLORIDE HEMIHYDRATE 30 MILLIGRAM(S): 37.5 TABLET, FILM COATED, EXTENDED RELEASE ORAL at 11:56

## 2024-11-14 RX ADMIN — Medication 325 MILLIGRAM(S): at 11:55

## 2024-11-14 RX ADMIN — Medication 0.6 MILLIGRAM(S): at 11:56

## 2024-11-14 RX ADMIN — PANTOPRAZOLE SODIUM 40 MILLIGRAM(S): 40 TABLET, DELAYED RELEASE ORAL at 05:07

## 2024-11-14 RX ADMIN — Medication 1000 UNIT(S): at 11:55

## 2024-11-14 RX ADMIN — ACETAMINOPHEN 500MG 650 MILLIGRAM(S): 500 TABLET, COATED ORAL at 06:42

## 2024-11-14 RX ADMIN — POLYETHYLENE GLYCOL 3350 17 GRAM(S): 17 POWDER, FOR SOLUTION ORAL at 18:44

## 2024-11-14 RX ADMIN — ROSUVASTATIN CALCIUM 5 MILLIGRAM(S): 5 TABLET, FILM COATED ORAL at 12:17

## 2024-11-14 RX ADMIN — CLOPIDOGREL 75 MILLIGRAM(S): 75 TABLET, FILM COATED ORAL at 11:55

## 2024-11-14 RX ADMIN — AMLODIPINE BESYLATE 5 MILLIGRAM(S): 10 TABLET ORAL at 05:08

## 2024-11-14 NOTE — DIETITIAN INITIAL EVALUATION ADULT - PERTINENT MEDS FT
MEDICATIONS  (STANDING):  amLODIPine   Tablet 5 milliGRAM(s) Oral daily  aspirin 325 milliGRAM(s) Oral daily  cholecalciferol 1000 Unit(s) Oral daily  clopidogrel Tablet 75 milliGRAM(s) Oral daily  colchicine 0.6 milliGRAM(s) Oral daily  losartan 50 milliGRAM(s) Oral daily  pantoprazole    Tablet 40 milliGRAM(s) Oral before breakfast  PARoxetine 30 milliGRAM(s) Oral daily  polyethylene glycol 3350 17 Gram(s) Oral daily  polyethylene glycol 3350 17 Gram(s) Oral two times a day  rosuvastatin 5 milliGRAM(s) Oral daily    MEDICATIONS  (PRN):  acetaminophen     Tablet .. 650 milliGRAM(s) Oral every 6 hours PRN Mild Pain (1 - 3)  albuterol    90 MICROgram(s) HFA Inhaler 2 Puff(s) Inhalation every 6 hours PRN Shortness of Breath and/or Wheezing  bisacodyl Suppository 10 milliGRAM(s) Rectal daily PRN Constipation  melatonin 6 milliGRAM(s) Oral at bedtime PRN Insomnia  senna 2 Tablet(s) Oral at bedtime PRN Constipation

## 2024-11-14 NOTE — DIETITIAN INITIAL EVALUATION ADULT - ADD RECOMMEND
1. Continue Easy to Chew, Mildly Thickened Liquid diet.   2. Recommend Ensure Plus High Protein Daily 8 oz (Provides 350 kcal, 20 grams of protein) 1x/day to assist pt in meeting estimated protein energy needs.  3. Patient requires assist with feeds; Restorative Dining Room    4. Monitor PO intake, GI tolerance, skin integrity, labs, weight, and bowel movement regularity.   5. Honor food preferences as feasible. Assist with meals PRN and encourage PO intake.  6. Follow SLP recommendations  7. Provide ongoing diet education as needed  8. RD remains available upon request and will follow-up per protocol

## 2024-11-14 NOTE — DIETITIAN INITIAL EVALUATION ADULT - RD TO REMAIN AVAILABLE
Your discharge plan includes access to our free Care Transitions program.     As your Care Transition Nurse I will contact you via phone within 2 business days after your discharge from the hospital. Please have your discharge instructions and medications ready for review. Over the next 30 days I will call you at least once a week. I am able to assist with arranging follow-up appointments. I have direct contact with your physicians and will alert them with any health or medication concerns or relay your questions.     Your Care Transitions Nurse is Ashley Loaiza RN.    If you have any questions or concerns after your discharge and prior to our first call, you can reach me at [phone].     
yes

## 2024-11-14 NOTE — INPATIENT CERTIFICATION FOR MEDICARE PATIENTS - IN ORDER TO MEET MEDICARE REQUIREMENTS.
In order to meet Medicare requirements, the clinical documentation must support the information cited in the admission order.  Please be sure to provide detailed and clear documentation about the following in the admitting note/history and physical: Patient requests all Lab and Radiology Results on their Discharge Instructions

## 2024-11-14 NOTE — INPATIENT CERTIFICATION FOR MEDICARE PATIENTS - PHYSICIAN CONCUR
On chronic supplemental oxygen at 4 L nasal cannula, the patient is in no acute exacerbation  On Anoro Ellipta and Atrovent daily  Monitor O2 sats per unit protocol   I concur with the Admission Order and I certify that services are provided in accordance with Section 42 CFR § 412.3

## 2024-11-14 NOTE — PROGRESS NOTE ADULT - ASSESSMENT
Pt alert, fair attention,  impaired expressive language (but able to follow simple commands and answer to yes/no questions), thought processes - apparently goal-directed, thought contents - unable to assess, depressed affect, euthymic mood, denied current AH/VH, denied SI/HI/I/P, calm behavior. Plan: Continue the assessment process.

## 2024-11-14 NOTE — DIETITIAN INITIAL EVALUATION ADULT - PERTINENT LABORATORY DATA
11-14    140  |  104  |  21  ----------------------------<  97  4.0   |  26  |  0.96    Ca    8.9      14 Nov 2024 06:23    TPro  6.8  /  Alb  2.7[L]  /  TBili  0.5  /  DBili  x   /  AST  37  /  ALT  29  /  AlkPhos  51  11-14

## 2024-11-14 NOTE — CONSULT NOTE ADULT - SUBJECTIVE AND OBJECTIVE BOX
Patient is a 69y old  Male who presents with a chief complaint of L MCA infarct w/ hemorrhagic conversion (13 Nov 2024 15:44)      HPI:  68 y/o M w/ PMHx HLD, depression, GERD, gout, colonic polyps,  congenital absence of one kidney, recent cholecystitis s/p cholecystectomy (6/24), RHETT on CPAP [facility]inially [facility] with [facility]asia, R sided weakness and R facial droop and vigorous cough x 2 weeks s/p COVID vaccine. W 2045 on 11/2. 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 Patient transferred to [facility] [place] for mechanical thrombectomy. S/p L ICA terminus occulusion a/p L ICA thrombectomy, no stent, 5 passes TICI 2C  w/ underling L petrous ICA dissection on 11/3 w/ Dr. March. Post op course c/b slight pseudoaneurysm enlargement w/ dissection distal embolisim into the terminal ICA seen on 11/4 CTA and hemorrhagic transformation of L MCA infarct. Of note, hospital course also significant for fevers, suspect aspiration pneumonitis treated with Rocephin x 1, and dysphagia requiring easy to chew,  with mod thick liquids. EP was consulted with concerns of cardioembolic source for infarct, recommended to f/u outpatient for Zio patch. Patient optimized and was evaluated by PM&R and therapy for functional deficits, gait/ADL impairments and acute rehabilitation was recommended. Patient was cleared for discharge to [facility] IRU on 11/13/24.  (13 Nov 2024 15:44)      TODAY:  Patient seen and examined in NAD  No overnight event  complains of fevers     PAST MEDICAL & SURGICAL HISTORY:  GERD (gastroesophageal reflux disease)  Colon polyps  Major depression  Gout  Hypercholesteremia  RHETT on CPAP  S/P cholecystectomy  S/P cataract surgery  S/P skin cancer resection  H/O total hip arthroplasty      family history- not contributory         SOCIAL HISTORY  Smoking - Denies  EtOH - Denies  Drugs - Denies  Education: Some college      ALLERGIES:  penicillin (Swelling)              REVIEW OF SYSTEMS:  CONSTITUTIONAL: No fever, weight loss, or fatigue  EYES: No eye pain, visual disturbances, or discharge  RESPIRATORY: No cough, wheezing, chills or hemoptysis; No shortness of breath  CARDIOVASCULAR: No chest pain, palpitations, dizziness, or leg swelling  GASTROINTESTINAL: No abdominal or epigastric pain. No nausea, vomiting, or hematemesis; No diarrhea or constipation. No melena or hematochezia.  GENITOURINARY: No dysuria, frequency, hematuria, or incontinence  NEUROLOGICAL: No headaches, memory loss, loss of strength, numbness, or tremors  SKIN: No itching, burning, rashes, or lesions   MUSCULOSKELETAL: No joint pain or swelling; No muscle, back, or extremity pain  PSYCHIATRIC: No depression, anxiety, mood swings, or difficulty sleeping    ALL OTHER SYSTEMS REVIEWED AND ARE NEGATIVE    VITAL SIGNS:  T(C): 36.8 (11-14-24 @ 07:41), Max: 36.8 (11-13-24 @ 21:34)  HR: 54 (11-14-24 @ 07:41) (54 - 60)  BP: 125/79 (11-14-24 @ 07:41) (125/79 - 129/78)  RR: 14 (11-14-24 @ 07:41) (14 - 15)  SpO2: 95% (11-14-24 @ 07:41) (95% - 98%)  Wt(kg): --Vital Signs Last 24 Hrs  T(C): 36.8 (14 Nov 2024 07:41), Max: 36.8 (13 Nov 2024 21:34)  T(F): 98.2 (14 Nov 2024 07:41), Max: 98.3 (13 Nov 2024 21:34)  HR: 54 (14 Nov 2024 07:41) (54 - 60)  BP: 125/79 (14 Nov 2024 07:41) (125/79 - 129/78)  BP(mean): --  RR: 14 (14 Nov 2024 07:41) (14 - 15)  SpO2: 95% (14 Nov 2024 07:41) (95% - 98%)    Parameters below as of 14 Nov 2024 07:41  Patient On (Oxygen Delivery Method): room air        Physical Exam: Constitutional - NAD, Comfortable   HEENT - NCAT, EOMI   Neck - Supple, No limited ROM  Chest - good chest expansion, good respiratory effort, CTAB    Cardio - warm and well perfused, RRR, no murmur   Abdomen -  Soft, NTND   Extremities - No peripheral edema, No calf tenderness    Neurologic Exam: Awake, Alert,   + Aphasia--Nonfluent,  + dysarthria,    Psychiatric - Mood stable, Affect WNL   Skin on admission: intact    LABS:                        12.2   5.26  )-----------( 279      ( 14 Nov 2024 06:23 )             37.2     11-14    140  |  104  |  21  ----------------------------<  97  4.0   |  26  |  0.96    Ca    8.9      14 Nov 2024 06:23    TPro  6.8  /  Alb  2.7[L]  /  TBili  0.5  /  DBili  x   /  AST  37  /  ALT  29  /  AlkPhos  51  11-14      Urinalysis Basic - ( 14 Nov 2024 06:23 )    Color: x / Appearance: x / SG: x / pH: x  Gluc: 97 mg/dL / Ketone: x  / Bili: x / Urobili: x   Blood: x / Protein: x / Nitrite: x   Leuk Esterase: x / RBC: x / WBC x   Sq Epi: x / Non Sq Epi: x / Bacteria: x       CAPILLARY BLOOD GLUCOSE            Urinalysis Basic - ( 14 Nov 2024 06:23 )    Color: x / Appearance: x / SG: x / pH: x  Gluc: 97 mg/dL / Ketone: x  / Bili: x / Urobili: x   Blood: x / Protein: x / Nitrite: x   Leuk Esterase: x / RBC: x / WBC x   Sq Epi: x / Non Sq Epi: x / Bacteria: x          Previous Consultant(s) Notes Reviewed:  YES  Care Discussed with Consultants/Other Providers YES  Previous Imaging Personally Reviewed:  YES

## 2024-11-14 NOTE — DIETITIAN INITIAL EVALUATION ADULT - ORAL INTAKE PTA/DIET HISTORY
Evaluation limited by aphasia,  asked mostly yes/no questions due to difficulty answering open ended questions. NKFA nor food intolerances reported. Per chart review s/p MBS (11/12), diet texture upgraded to Easy to Chew.

## 2024-11-14 NOTE — DIETITIAN INITIAL EVALUATION ADULT - OTHER INFO
Reason for Admission: L MCA infarct w/ hemorrhagic conversion  History of Present Illness:   70 y/o M w/ PMHx HLD, depression, GERD, gout, colonic polyps,  congenital absence of one kidney, recent cholecystitis s/p cholecystectomy (6/24), RHETT on CPAP orginially presented to [facility] on 11/3 with aphasia, R sided weakness and R facial droop and vigorous cough x 2 weeks s/p COVID vaccine. LKW 2045 on 11/2. 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 [place] [facility] [place]. S/p L ICA terminus occulusion a/p L ICA thrombectomy, no stent, 5 passes TICI 2C  w/ underling L petrous ICA dissection on 11/3 w/ Dr. March. Post op course c/b slight pseudoaneurysm enlargement w/ dissection distal embolisim into the terminal ICA seen on 11/4 CTA and hemorrhagic transformation of L MCA infarct. Of note, hospital course also significant for fevers, suspect aspiration pneumonitis treated with Rocephin x 1, and dysphagia requiring easy to chew,  with mod thick liquids. EP was consulted with concerns of cardioembolic source for infarct, recommended to f/u outpatient for Zio patch. Patient optimized and was evaluated by PM&R and therapy for functional deficits, gait/ADL impairments and acute rehabilitation was recommended. Patient was cleared for discharge to [facility] IRU on 11/13/24.     Visited w/ patient during lunch with good PO intake, consuming 100% of entree. Recommend Ensure Plus High Protein Daily 8 oz (Provides 350 kcal, 20 grams of protein) Qd to assist patient in meeting estimated energy requirements. Patient on Easy to Chew, no issues w/ dentition or chewing and swallowing current diet texture.  No N/V/C/D noted, last BM 11/13 Per nursing flowsheets. CBW 224lb (11/13).  Education provided on Easy to Chew diet texture and Mildly Thickened liquids. W/ Hx HDL, monitor need for Heart-Healthy DASH/TLC meal pattern.

## 2024-11-14 NOTE — PROGRESS NOTE ADULT - SUBJECTIVE AND OBJECTIVE BOX
Pt was seen for 25 minutes for supportive tx. Pt c/o depressed mood, anxiety. Reviewed chart, approached Pt for an initial consult, introduced the role of neuropsychology in the rehab team, and explained the nature and purpose of the consult. Pt is a 70 y/o male with PMHx of HLD, depression, GERD, gout, colonic polyps, congenital absence of one kidney, recent cholecystitis s/p cholecystectomy (6/24), RHETT on CPAP orginially presented to [facility] on 11/3 with aphasia, R sided weakness and R facial droop and vigorous cough x 2 weeks s/p COVID vaccine. 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  S/p L ICA terminus occulusion a/p L ICA thrombectomy, no stent, 5 passes TICI 2C  w/ underling L petrous ICA dissection on 11/3. Pt agreed to participate; Pt is expressively aphasic, with relatively intact comprehension, and able to answer to yes/no questions. Session focused on establishing rapport with Pt, gathering relevant biographical information,  and assessing hisher current emotional functioning. Pt provided limited information about his/her medical hx and social hx. In addition, he answered all questions during the clinical interview in order to elicit emotional symptoms. Pt reported experiencing depressed mood, anxiety, helplessness/hopelessness, poor sleep and low energy. He admitted to experiencing VH in the hospital but was unable to describe them. Pt admitted to having concerns related to hsi health and recovery, his family and his business. Support and encouragement were provided.

## 2024-11-15 PROCEDURE — 99233 SBSQ HOSP IP/OBS HIGH 50: CPT

## 2024-11-15 RX ORDER — ENOXAPARIN SODIUM 30 MG/.3ML
40 INJECTION SUBCUTANEOUS EVERY 24 HOURS
Refills: 0 | Status: DISCONTINUED | OUTPATIENT
Start: 2024-11-15 | End: 2024-12-05

## 2024-11-15 RX ORDER — ACETAMINOPHEN, DIPHENHYDRAMINE HCL, PHENYLEPHRINE HCL 325; 25; 5 MG/1; MG/1; MG/1
6 TABLET ORAL AT BEDTIME
Refills: 0 | Status: DISCONTINUED | OUTPATIENT
Start: 2024-11-15 | End: 2024-11-16

## 2024-11-15 RX ORDER — ALBUTEROL 90 MCG
2 AEROSOL (GRAM) INHALATION EVERY 6 HOURS
Refills: 0 | Status: DISCONTINUED | OUTPATIENT
Start: 2024-11-15 | End: 2024-12-01

## 2024-11-15 RX ADMIN — CLOPIDOGREL 75 MILLIGRAM(S): 75 TABLET, FILM COATED ORAL at 12:16

## 2024-11-15 RX ADMIN — ACETAMINOPHEN 500MG 650 MILLIGRAM(S): 500 TABLET, COATED ORAL at 21:23

## 2024-11-15 RX ADMIN — Medication 2 PUFF(S): at 21:46

## 2024-11-15 RX ADMIN — ACETAMINOPHEN 500MG 650 MILLIGRAM(S): 500 TABLET, COATED ORAL at 22:00

## 2024-11-15 RX ADMIN — PANTOPRAZOLE SODIUM 40 MILLIGRAM(S): 40 TABLET, DELAYED RELEASE ORAL at 05:52

## 2024-11-15 RX ADMIN — AMLODIPINE BESYLATE 5 MILLIGRAM(S): 10 TABLET ORAL at 05:52

## 2024-11-15 RX ADMIN — ACETAMINOPHEN, DIPHENHYDRAMINE HCL, PHENYLEPHRINE HCL 6 MILLIGRAM(S): 325; 25; 5 TABLET ORAL at 21:24

## 2024-11-15 RX ADMIN — Medication 325 MILLIGRAM(S): at 12:17

## 2024-11-15 RX ADMIN — ROSUVASTATIN CALCIUM 5 MILLIGRAM(S): 5 TABLET, FILM COATED ORAL at 12:16

## 2024-11-15 RX ADMIN — Medication 1000 UNIT(S): at 12:17

## 2024-11-15 RX ADMIN — POLYETHYLENE GLYCOL 3350 17 GRAM(S): 17 POWDER, FOR SOLUTION ORAL at 05:52

## 2024-11-15 RX ADMIN — Medication 0.6 MILLIGRAM(S): at 12:16

## 2024-11-15 RX ADMIN — ENOXAPARIN SODIUM 40 MILLIGRAM(S): 30 INJECTION SUBCUTANEOUS at 17:32

## 2024-11-15 RX ADMIN — Medication 2 PUFF(S): at 15:50

## 2024-11-15 RX ADMIN — POLYETHYLENE GLYCOL 3350 17 GRAM(S): 17 POWDER, FOR SOLUTION ORAL at 17:31

## 2024-11-15 RX ADMIN — PAROXETINE HYDROCHLORIDE HEMIHYDRATE 30 MILLIGRAM(S): 37.5 TABLET, FILM COATED, EXTENDED RELEASE ORAL at 12:16

## 2024-11-15 RX ADMIN — LOSARTAN POTASSIUM 50 MILLIGRAM(S): 100 TABLET, FILM COATED ORAL at 05:52

## 2024-11-15 NOTE — PROGRESS NOTE ADULT - SUBJECTIVE AND OBJECTIVE BOX
HPI:    70 y/o M w/ PMHx HLD, depression, GERD, gout, colonic polyps,  congenital absence of one kidney, recent cholecystitis s/p cholecystectomy (6/24), RHETT on CPAP orginially presented to [facility] on 11/3 with aphasia, R sided weakness and R facial droop and vigorous cough x 2 weeks s/p COVID vaccine. LKW 2045 on 11/2. 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 Patient transferred to [facility] for mechanical thrombectomy. S/p L ICA terminus occulusion a/p L ICA thrombectomy, no stent, 5 passes TICI 2C  w/ underling L petrous ICA dissection on 11/3 w/ Dr. March. Post op course c/b slight pseudoaneurysm enlargement w/ dissection distal embolisim into the terminal ICA seen on 11/4 CTA and hemorrhagic transformation of L MCA infarct.   Of note, hospital course also significant for fevers, suspect aspiration pneumonitis treated with Rocephin x 1, and dysphagia requiring easy to chew,  with mod thick liquids. EP was consulted with concerns of cardioembolic source for infarct, recommended to f/u outpatient for Zio patch. Patient had an MBS on 11/12 which showed moderate oropharyngeal dysphagia, remarkable for delayed/reduced laryngeal vestibular closure, pharyngeal trigger delay and reduced hyolaryngeal elevation/excursion.  There was deep laryngeal penetration and possibly aspiration of thin liquid.  Patient was cleared for easy to chew GI diet with mildly thick liquids.Patient optimized and was evaluated by PM&R and therapy for functional deficits, gait/ADL impairments and acute rehabilitation was recommended. Patient was cleared for discharge to [facility] IRU on 11/13/24.       SUBJECTIVE:         ROS:     Denies HA, N/V, dizziness, abdominal pain, N/V, CP, palpitations, SOB/dyspnea or cough   Neurological Deficits    VITALS:      Vital Signs Last 24 Hrs  T(C): 36.6 (15 Nov 2024 09:49), Max: 36.6 (14 Nov 2024 19:33)  T(F): 97.9 (15 Nov 2024 09:49), Max: 97.9 (15 Nov 2024 09:49)  HR: 83 (15 Nov 2024 09:49) (60 - 83)  BP: 112/75 (15 Nov 2024 09:49) (112/75 - 115/70)  BP(mean): --  RR: 14 (15 Nov 2024 09:49) (14 - 15)  SpO2: 94% (15 Nov 2024 09:49) (94% - 95%)    Parameters below as of 15 Nov 2024 09:49  Patient On (Oxygen Delivery Method): nasal cannula  O2 Flow (L/min): 2      PHYSICAL EXAM:   Constitutional - NAD, Comfortable   HEENT - NCAT, EOMI   Neck - Supple, No limited ROM  Chest - good chest expansion, good respiratory effort, CTAB    Cardio - warm and well perfused, RRR, no murmur   Abdomen -  Soft, NTND   Extremities - No peripheral edema, No calf tenderness      Neurologic Exam:                       Cognitive -              Orientation: Awake, Alert, Orientation limited due to aphasia            Attention:  limited due to aphasia; can follow simple 1 step commands; 2 step commands with delayed processing            Memory: unable to test due to aphasia     Speech – + Aphasia--Nonfluent, Impaired multiple step command following, but able to follow all simple commands, impaired repetition; + dysarthria,       Cranial Nerves – PERRLA , EOMI, + left facial weakness; facial sensation impaired on right, + dysarthria, + dysphagia, Shoulder shrug impaired        Motor -                        LEFT    UE - ShAB 5/5, EF 5/5, EE 5/5, WE 5/5,  5/5                     RIGHT UE - ShAB 0/5, EF 0/5, EE 0/5, WE 0/5,  0/5                     LEFT    LE - HF 5/5, KE 5/5, DF 5/5, PF 5/5                     RIGHT LE - HF 0/5, KE 0/5, DF 0/5, PF 0/5        Coordination - FTN/HTS intact on the left; unable to assess due to weaknes son the right     Sensory – Impaired to LT  on right side;-- withdraws to pain Left LE but no response left UE     Reflexes - DTR  2/ 4 , neg Soria's b/l,  Tone: no increased tone    MSK: right shoulder PROM WNL, +sublux  Psychiatric - Mood stable, Affect WNL   Skin on admission: intact      MEDICATIONS  (STANDING):  amLODIPine   Tablet 5 milliGRAM(s) Oral daily  aspirin 325 milliGRAM(s) Oral daily  cholecalciferol 1000 Unit(s) Oral daily  clopidogrel Tablet 75 milliGRAM(s) Oral daily  colchicine 0.6 milliGRAM(s) Oral daily  enoxaparin Injectable 40 milliGRAM(s) SubCutaneous every 24 hours  losartan 50 milliGRAM(s) Oral daily  melatonin 6 milliGRAM(s) Oral at bedtime  pantoprazole    Tablet 40 milliGRAM(s) Oral before breakfast  PARoxetine 30 milliGRAM(s) Oral daily  polyethylene glycol 3350 17 Gram(s) Oral two times a day  rosuvastatin 5 milliGRAM(s) Oral daily    MEDICATIONS  (PRN):  acetaminophen     Tablet .. 650 milliGRAM(s) Oral every 6 hours PRN Mild Pain (1 - 3)  albuterol    90 MICROgram(s) HFA Inhaler 2 Puff(s) Inhalation every 6 hours PRN Shortness of Breath and/or Wheezing  bisacodyl Suppository 10 milliGRAM(s) Rectal daily PRN Constipation  senna 2 Tablet(s) Oral at bedtime PRN Constipation        RECENT LABS:                              12.2   5.26  )-----------( 279      ( 14 Nov 2024 06:23 )             37.2     11-14    140  |  104  |  21  ----------------------------<  97  4.0   |  26  |  0.96    Ca    8.9      14 Nov 2024 06:23    TPro  6.8  /  Alb  2.7[L]  /  TBili  0.5  /  DBili  x   /  AST  37  /  ALT  29  /  AlkPhos  51  11-14      Urinalysis Basic - ( 14 Nov 2024 06:23 )    Color: x / Appearance: x / SG: x / pH: x  Gluc: 97 mg/dL / Ketone: x  / Bili: x / Urobili: x   Blood: x / Protein: x / Nitrite: x   Leuk Esterase: x / RBC: x / WBC x   Sq Epi: x / Non Sq Epi: x / Bacteria: x      CAPILLARY BLOOD GLUCOSE        RADIOLOGY:     11/4 TTE - limited study, probable normal LA and LV function    11/3 EKG - Sinus hayder w/ RBBB; repeat 11/4- NSR w/ RBBB    CTH 11/3: hyperdense appearance of L basal ganglia and L frontal lobe gyri, mild LCA edema 2/2 recent ischemia  CTA H/N 11/3: no evidence of significant stenosis affecting the extra cranial carotid or vertebral arteries. No evidence of aneurysm or significant vascular stenosis at [place]  Repeat CTH 11/4: post recent endovascular revascularization of the left internal carotid artery and left middle cerebral artery. Minimally less conspicuous hyperdense appearance of left basal ganglia and left frontal lobe gyri which may represent expected evolution of contrast staining  MRI brain 11/5: evolving recent infarct in L ICA associated with hemorrhagic transformation of the L basal ganglia. Effacement of cerebral sulci with mass effect causing 0.6 mm rightward shift.   CTH 11/9: evolving acute to subacute left MCA infarct with evolving left basal ganglia hemorrhagic transformation, new punctuate foci of increased hyperdensity. Stable mass effect and 5mm rightward midline shift  CTA 11/9: stable left internal carotid artery pseudoaneurysm immediately inferior to the petrous portion of the L ICA at the area of focal dissection.     HPI:    68 y/o M w/ PMHx HLD, depression, GERD, gout, colonic polyps,  congenital absence of one kidney, recent cholecystitis s/p cholecystectomy (6/24), RHETT on CPAP orginially presented to [facility] on 11/3 with aphasia, R sided weakness and R facial droop and vigorous cough x 2 weeks s/p COVID vaccine. LKW 2045 on 11/2. 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 Patient transferred to [facility] [place] for mechanical thrombectomy. S/p L ICA terminus occulusion a/p L ICA thrombectomy, no stent, 5 passes TICI 2C  w/ underling L petrous ICA dissection on 11/3 w/ Dr. March. Post op course c/b slight pseudoaneurysm enlargement w/ dissection distal embolisim into the terminal ICA seen on 11/4 CTA and hemorrhagic transformation of L MCA infarct.   Of note, hospital course also significant for fevers, suspect aspiration pneumonitis treated with Rocephin x 1, and dysphagia requiring easy to chew,  with mod thick liquids. EP was consulted with concerns of cardioembolic source for infarct, recommended to f/u outpatient for Zio patch. Patient had an MBS on 11/12 which showed moderate oropharyngeal dysphagia, remarkable for delayed/reduced laryngeal vestibular closure, pharyngeal trigger delay and reduced hyolaryngeal elevation/excursion.  There was deep laryngeal penetration and possibly aspiration of thin liquid.  Patient was cleared for easy to chew GI diet with mildly thick liquids.Patient optimized and was evaluated by PM&R and therapy for functional deficits, gait/ADL impairments and acute rehabilitation was recommended. Patient was cleared for discharge to [facility] IRU on 11/13/24.       SUBJECTIVE:     Patient was seen and examined this morning at bedside with his wife present. Denies any pain. States he had trouble sleeping due to feeling uncomfortable, has a history of taking THC gummies at night to assist with sleep. Discussed scheduling melatonin at night instead of PRN. Denies any SOB at night, currently on 2 L O2 per NC. Denies any use of O2 at home prior to hospitalization. Has no trouble urinating after episode of bladder retention yesterday that required intermittent catheterization. Denies dysuria. With regards to DVT prophylaxis, the patient's wife notes he was given plavix and low-dose lovenox at [place] but is unsure when the lovenox was discontinued.      ROS:     Denies HA, N/V, dizziness, abdominal pain, N/V, CP, palpitations, SOB/dyspnea or cough   Neurological Deficits    VITALS:      Vital Signs Last 24 Hrs  T(C): 36.6 (15 Nov 2024 09:49), Max: 36.6 (14 Nov 2024 19:33)  T(F): 97.9 (15 Nov 2024 09:49), Max: 97.9 (15 Nov 2024 09:49)  HR: 83 (15 Nov 2024 09:49) (60 - 83)  BP: 112/75 (15 Nov 2024 09:49) (112/75 - 115/70)  BP(mean): --  RR: 14 (15 Nov 2024 09:49) (14 - 15)  SpO2: 94% (15 Nov 2024 09:49) (94% - 95%)    Parameters below as of 15 Nov 2024 09:49  Patient On (Oxygen Delivery Method): nasal cannula  O2 Flow (L/min): 2      PHYSICAL EXAM:   Constitutional - NAD, Comfortable   HEENT - NCAT, EOMI   Neck - Supple, No limited ROM  Chest - good chest expansion, good respiratory effort, CTAB    Cardio - warm and well perfused, RRR, no murmur   Abdomen -  Soft, NTND   Extremities - No peripheral edema, No calf tenderness      Neurologic Exam:                       Cognitive -              Orientation: Awake, Alert, Orientation limited due to aphasia            Attention:  limited due to aphasia; can follow simple 1 step commands; 2 step commands with delayed processing            Memory: unable to test due to aphasia     Speech – + Aphasia--Nonfluent, Impaired multiple step command following, but able to follow all simple commands, impaired repetition; + dysarthria,       Cranial Nerves – PERRLA , EOMI, + left facial weakness; facial sensation impaired on right, + dysarthria, + dysphagia, Shoulder shrug impaired        Motor -                        LEFT    UE - ShAB 5/5, EF 5/5, EE 5/5, WE 5/5,  5/5                     RIGHT UE - ShAB 0/5, EF 0/5, EE 0/5, WE 0/5,  0/5                     LEFT    LE - HF 5/5, KE 5/5, DF 5/5, PF 5/5                     RIGHT LE - HF 0/5, KE 0/5, DF 0/5, PF 0/5        Coordination - FTN/HTS intact on the left; unable to assess due to weaknes son the right     Sensory – Impaired to LT  on right side;-- withdraws to pain Left LE but no response left UE     Reflexes - DTR  2/ 4 , neg Soria's b/l,  Tone: no increased tone    MSK: right shoulder PROM WNL, +sublux  Psychiatric - Mood stable, Affect WNL   Skin on admission: intact      MEDICATIONS  (STANDING):  amLODIPine   Tablet 5 milliGRAM(s) Oral daily  aspirin 325 milliGRAM(s) Oral daily  cholecalciferol 1000 Unit(s) Oral daily  clopidogrel Tablet 75 milliGRAM(s) Oral daily  colchicine 0.6 milliGRAM(s) Oral daily  enoxaparin Injectable 40 milliGRAM(s) SubCutaneous every 24 hours  losartan 50 milliGRAM(s) Oral daily  melatonin 6 milliGRAM(s) Oral at bedtime  pantoprazole    Tablet 40 milliGRAM(s) Oral before breakfast  PARoxetine 30 milliGRAM(s) Oral daily  polyethylene glycol 3350 17 Gram(s) Oral two times a day  rosuvastatin 5 milliGRAM(s) Oral daily    MEDICATIONS  (PRN):  acetaminophen     Tablet .. 650 milliGRAM(s) Oral every 6 hours PRN Mild Pain (1 - 3)  albuterol    90 MICROgram(s) HFA Inhaler 2 Puff(s) Inhalation every 6 hours PRN Shortness of Breath and/or Wheezing  bisacodyl Suppository 10 milliGRAM(s) Rectal daily PRN Constipation  senna 2 Tablet(s) Oral at bedtime PRN Constipation        RECENT LABS:                              12.2   5.26  )-----------( 279      ( 14 Nov 2024 06:23 )             37.2     11-14    140  |  104  |  21  ----------------------------<  97  4.0   |  26  |  0.96    Ca    8.9      14 Nov 2024 06:23    TPro  6.8  /  Alb  2.7[L]  /  TBili  0.5  /  DBili  x   /  AST  37  /  ALT  29  /  AlkPhos  51  11-14      Urinalysis Basic - ( 14 Nov 2024 06:23 )    Color: x / Appearance: x / SG: x / pH: x  Gluc: 97 mg/dL / Ketone: x  / Bili: x / Urobili: x   Blood: x / Protein: x / Nitrite: x   Leuk Esterase: x / RBC: x / WBC x   Sq Epi: x / Non Sq Epi: x / Bacteria: x      CAPILLARY BLOOD GLUCOSE        RADIOLOGY:     11/4 TTE - limited study, probable normal LA and LV function    11/3 EKG - Sinus hayder w/ RBBB; repeat 11/4- NSR w/ RBBB    CTH 11/3: hyperdense appearance of L basal ganglia and L frontal lobe gyri, mild LCA edema 2/2 recent ischemia  CTA H/N 11/3: no evidence of significant stenosis affecting the extra cranial carotid or vertebral arteries. No evidence of aneurysm or significant vascular stenosis at [place]  Repeat CTH 11/4: post recent endovascular revascularization of the left internal carotid artery and left middle cerebral artery. Minimally less conspicuous hyperdense appearance of left basal ganglia and left frontal lobe gyri which may represent expected evolution of contrast staining  MRI brain 11/5: evolving recent infarct in L ICA associated with hemorrhagic transformation of the L basal ganglia. Effacement of cerebral sulci with mass effect causing 0.6 mm rightward shift.   CTH 11/9: evolving acute to subacute left MCA infarct with evolving left basal ganglia hemorrhagic transformation, new punctuate foci of increased hyperdensity. Stable mass effect and 5mm rightward midline shift  CTA 11/9: stable left internal carotid artery pseudoaneurysm immediately inferior to the petrous portion of the L ICA at the area of focal dissection.     HPI:    68 y/o M w/ PMHx HLD, depression, GERD, gout, colonic polyps,  congenital absence of one kidney, recent cholecystitis s/p cholecystectomy (6/24), RHETT on CPAP orginially presented to [facility] on 11/3 with aphasia, R sided weakness and R facial droop and vigorous cough x 2 weeks s/p COVID vaccine. LKW 2045 on 11/2. 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 Patient transferred to [facility] for mechanical thrombectomy. S/p L ICA terminus occulusion a/p L ICA thrombectomy, no stent, 5 passes TICI 2C  w/ underling L petrous ICA dissection on 11/3 w/ Dr. March. Post op course c/b slight pseudoaneurysm enlargement w/ dissection distal embolisim into the terminal ICA seen on 11/4 CTA and hemorrhagic transformation of L MCA infarct.   Of note, hospital course also significant for fevers, suspect aspiration pneumonitis treated with Rocephin x 1, and dysphagia requiring easy to chew,  with mod thick liquids. EP was consulted with concerns of cardioembolic source for infarct, recommended to f/u outpatient for Zio patch. Patient had an MBS on 11/12 which showed moderate oropharyngeal dysphagia, remarkable for delayed/reduced laryngeal vestibular closure, pharyngeal trigger delay and reduced hyolaryngeal elevation/excursion.  There was deep laryngeal penetration and possibly aspiration of thin liquid.  Patient was cleared for easy to chew GI diet with mildly thick liquids.Patient optimized and was evaluated by PM&R and therapy for functional deficits, gait/ADL impairments and acute rehabilitation was recommended. Patient was cleared for discharge to [facility] IRU on 11/13/24.       SUBJECTIVE:     Patient was seen and examined this morning at bedside with his wife present. Denies any pain. States he had trouble sleeping due to feeling uncomfortable, has a history of taking THC gummies at night to assist with sleep. Discussed scheduling melatonin at night instead of PRN. Denies any SOB at night, currently on 2 L O2 per NC. Denies any use of O2 at home prior to hospitalization. Has no trouble urinating after episode of bladder retention yesterday that required intermittent catheterization. PVR= 49, 60.  Denies dysuria. With regards to DVT prophylaxis, the patient's wife notes he was given plavix and low-dose lovenox at [place] but is unsure when the lovenox was discontinued.  Pt. using CPAP at night-- nursing states he took it off at some point.   Pt. is frustrated with his deficits.  Wife reports he was independent and active at home.        ROS:     Denies HA, N/V, dizziness, abdominal pain, N/V, CP, palpitations, SOB/dyspnea or cough   Neurological Deficits    VITALS:      Vital Signs Last 24 Hrs  T(C): 36.6 (15 Nov 2024 09:49), Max: 36.6 (14 Nov 2024 19:33)  T(F): 97.9 (15 Nov 2024 09:49), Max: 97.9 (15 Nov 2024 09:49)  HR: 83 (15 Nov 2024 09:49) (60 - 83)  BP: 112/75 (15 Nov 2024 09:49) (112/75 - 115/70)  BP(mean): --  RR: 14 (15 Nov 2024 09:49) (14 - 15)  SpO2: 94% (15 Nov 2024 09:49) (94% - 95%)    Parameters below as of 15 Nov 2024 09:49  Patient On (Oxygen Delivery Method): nasal cannula  O2 Flow (L/min): 2      PHYSICAL EXAM:   Constitutional - NAD, Comfortable   HEENT - NCAT, EOMI   Neck - Supple, No limited ROM  Chest - good chest expansion, good respiratory effort, CTAB    Cardio - warm and well perfused, RRR, no murmur   Abdomen -  Soft, NTND   Extremities - No peripheral edema, No calf tenderness      Neurologic Exam:                       Cognitive -              Orientation: Awake, Alert, Orientation limited due to aphasia            Attention:  limited due to aphasia; can follow simple 1 step commands; 2 step commands with delayed processing            Memory: unable to test due to aphasia     Speech – + Aphasia--Nonfluent, Impaired multiple step command following, but able to follow all simple commands, impaired repetition; + dysarthria,       Cranial Nerves – PERRLA , EOMI, + left facial weakness; facial sensation impaired on right, + dysarthria, + dysphagia, Shoulder shrug impaired        Motor -                        LEFT    UE - ShAB 5/5, EF 5/5, EE 5/5, WE 5/5,  5/5                     RIGHT UE - ShAB 0/5, EF 0/5, EE 0/5, WE 0/5,  0/5                     LEFT    LE - HF 5/5, KE 5/5, DF 5/5, PF 5/5                     RIGHT LE - HF 0/5, KE 0/5, DF 0/5, PF 0/5        Coordination - FTN/HTS intact on the left; unable to assess due to weaknes son the right     Sensory – Impaired to LT  on right side;-- withdraws to pain Left LE but no response left UE     Reflexes - DTR  2/ 4 , neg Soria's b/l,  Tone: no increased tone    MSK: right shoulder PROM WNL, +sublux  Psychiatric - Mood stable, Affect WNL   Skin on admission: intact      MEDICATIONS  (STANDING):  amLODIPine   Tablet 5 milliGRAM(s) Oral daily  aspirin 325 milliGRAM(s) Oral daily  cholecalciferol 1000 Unit(s) Oral daily  clopidogrel Tablet 75 milliGRAM(s) Oral daily  colchicine 0.6 milliGRAM(s) Oral daily  enoxaparin Injectable 40 milliGRAM(s) SubCutaneous every 24 hours  losartan 50 milliGRAM(s) Oral daily  melatonin 6 milliGRAM(s) Oral at bedtime  pantoprazole    Tablet 40 milliGRAM(s) Oral before breakfast  PARoxetine 30 milliGRAM(s) Oral daily  polyethylene glycol 3350 17 Gram(s) Oral two times a day  rosuvastatin 5 milliGRAM(s) Oral daily    MEDICATIONS  (PRN):  acetaminophen     Tablet .. 650 milliGRAM(s) Oral every 6 hours PRN Mild Pain (1 - 3)  albuterol    90 MICROgram(s) HFA Inhaler 2 Puff(s) Inhalation every 6 hours PRN Shortness of Breath and/or Wheezing  bisacodyl Suppository 10 milliGRAM(s) Rectal daily PRN Constipation  senna 2 Tablet(s) Oral at bedtime PRN Constipation        RECENT LABS:                              12.2   5.26  )-----------( 279      ( 14 Nov 2024 06:23 )             37.2     11-14    140  |  104  |  21  ----------------------------<  97  4.0   |  26  |  0.96    Ca    8.9      14 Nov 2024 06:23    TPro  6.8  /  Alb  2.7[L]  /  TBili  0.5  /  DBili  x   /  AST  37  /  ALT  29  /  AlkPhos  51  11-14      Urinalysis Basic - ( 14 Nov 2024 06:23 )    Color: x / Appearance: x / SG: x / pH: x  Gluc: 97 mg/dL / Ketone: x  / Bili: x / Urobili: x   Blood: x / Protein: x / Nitrite: x   Leuk Esterase: x / RBC: x / WBC x   Sq Epi: x / Non Sq Epi: x / Bacteria: x      CAPILLARY BLOOD GLUCOSE        RADIOLOGY:    EXAM:  CT BRAIN   ORDERED BY:  SAAD DILL     PROCEDURE DATE:  11/14/2024          INTERPRETATION:  CLINICAL INFORMATION: DVT, monitor Hem to start lovenox    COMPARISON: None.    CONTRAST:  IV Contrast: NONE      TECHNIQUE:  Serial axial images were obtained from the skull base to the   vertex using multi-slice helical technique. Sagittal and coronal   reformats were obtained.    FINDINGS:    VENTRICLES AND SULCI: Mass effect on the left lateral ventricle. Midline   shift to theright roughly 4 mm  INTRA-AXIAL: Evolving left MCA territory infarct with some subtle gyral   hemorrhage suggested along the superior margin of the infarct. Effacement   of the basal ganglia region..  If EXTRA-AXIAL: No mass or fluid collection. Basal cisterns are normal in   appearance.    VISUALIZED SINUSES:  Clear.  TYMPANOMASTOID CAVITIES:  Clear.  VISUALIZED ORBITS: Normal.  CALVARIUM: Intact.    MISCELLANEOUS: None.      IMPRESSION:  Evolving left MCA infarct with subtle mass effect on the left lateral   ventricle and midline shift to the right roughly 4 mm. Some subtle gyral   hemorrhage is suggested along the superior margin of the infarct.   Effacement of the deep gray structures on the left at the level of the   basal ganglia consistent with evolving infarct.        11/4 TTE - limited study, probable normal LA and LV function    11/3 EKG - Sinus hayder w/ RBBB; repeat 11/4- NSR w/ RBBB    CTH 11/3: hyperdense appearance of L basal ganglia and L frontal lobe gyri, mild LCA edema 2/2 recent ischemia  CTA H/N 11/3: no evidence of significant stenosis affecting the extra cranial carotid or vertebral arteries. No evidence of aneurysm or significant vascular stenosis at [place] of [place]  Repeat CTH 11/4: post recent endovascular revascularization of the left internal carotid artery and left middle cerebral artery. Minimally less conspicuous hyperdense appearance of left basal ganglia and left frontal lobe gyri which may represent expected evolution of contrast staining  MRI brain 11/5: evolving recent infarct in L ICA associated with hemorrhagic transformation of the L basal ganglia. Effacement of cerebral sulci with mass effect causing 0.6 mm rightward shift.   CTH 11/9: evolving acute to subacute left MCA infarct with evolving left basal ganglia hemorrhagic transformation, new punctuate foci of increased hyperdensity. Stable mass effect and 5mm rightward midline shift  CTA 11/9: stable left internal carotid artery pseudoaneurysm immediately inferior to the petrous portion of the L ICA at the area of focal dissection.

## 2024-11-15 NOTE — PROGRESS NOTE ADULT - ASSESSMENT
Assessment/Plan:  TARUN CRESPO is a 70 y/o M w/ PMHx HLD, depression, GERD, gout, colonic polyps,  congenital absence of one kidney, recent cholecystitis s/p cholecystectomy (6/24), RHETT on CPAP orginially presented to [facility] on 11/3 with aphasia, R sided weakness and R facial droop and vigorous cough x 2 weeks s/p COVID vaccine. 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  S/p L ICA terminus occulusion a/p L ICA thrombectomy, no stent, 5 passes TICI 2C  w/ underling L petrous ICA dissection on 11/3 w/ Dr. March. Now admitted to [facility] for functional deficits for initiation of a multidisciplinary rehab program with right hemiparesis, non-fluent aphasia, sensory impairment, dysphagia, and impaired functional mobility, transfers and ADLs.    #L MCA stroke w/ hemorrhagic conversion  #L petrous ICA dissection w/ pesudoaneurysm  -CTH 11/3: hyperdense appearance of L basal ganglia and L frontal lobe gyri, mild LCA edema 2/2 recent ischemia  -CTA H/N 11/3: no evidence of significant stenosis affecting the extra cranial carotid or vertebral arteries. No evidence of aneurysm or significant vascular stenosis at [place] [facility] [place]  -Repeat CTH 11/4: post recent endovascular revascularization of the left internal carotid artery and left middle cerebral artery. Minimally less conspicuous hyperdense appearance of left basal ganglia and left frontal lobe gyri which may represent expected evolution of contrast staining  -MRI brain 11/5: evolving recent infarct in L ICA associated with hemorrhagic transformation of the L basal ganglia. Effacement of cerebral sulci with mass effect causing 0.6 mm rightward shift.   -CTH 11/9: evolving acute to subacute left MCA infarct with evolving left basal ganglia hemorrhagic transformation, new punctuate foci of increased hyperdensity. Stable mass effect and 5mm rightward midline shift  -CTA 11/9: stable left internal carotid artery pseudoaneurysm immediately inferior to the petrous portion of the L ICA at the area of focal dissection.  -S/p L ICA terminus occulusion a/p L ICA thrombectomy, no stent, 5 passes TICI 2C  w/ underling L petrous ICA dissection on 11/3 w/ Dr. March.   -A1c 5.0; LDL 60  -No statin indicated given low LDL  -Aspirin 325mg daily  -Plavix 75mg daily   rosuvastatin 5mg QD  -Will be on DAPT for 3 months and follow up with outpt neurology  -F/u outpatient caridology for Zio-patch to r/o cardioembolic source --ILR to be considered as outpatient  -CTH reports with screen grabs in chart; CD imaging from [place] in chart and will need to uploaded to [facility]    Deficits: Dysarthria, apraxia of speech, aphasia, right hemiparesis   Comprehensive Multidisciplinary Rehab Program:  - Start comprehensive rehab program, 3 hours a day, 5 days a week.  - PT 1hr/day: Focused on improving strength, endurance, coordination, balance, functional mobility, and transfers  - OT 1hr/day: Focused on improving strength, fine motor skills, coordination, posture and ADLs.    - Speech Therapy 1hr/day: to diagnose and treat deficits in swallowing, cognition and communication.   - Activity Limitations: Decreased social, vocational and leisure activities, decreased self care and ADLs, decreased mobility, decreased ability to manage household and finances.   -Right resting hand splint 4hrs on/off during the day and on at night as tolerated    -----------------------------------------------------------------------------------  Concurrent Medical Problems    #Normocytic anemia (stable) vs Iron deficiency anemia   -Monitor H/H (11.2/34.2- on 11/13)  -F/u outpatient colonoscopy for hx of colon polps  -Transfuse <7 PRN    -Iron studies: Transferrin: 11, TIBC: 170, Iron: 18, Ferritin: 414    #Fevers  #Pneumonitis   -Fevers on 11/5 ; infectious work up negative  -Likely 2/2 chemical pneumonitis from aspiration of ice chips  -Blood cx negative  -MRSA/MSSA- negative  -S/p empiric Rocephin x1  -Monitor CBC, fevers    #RHETT  #URI   #Cough   -CXR- negative   -CPAP HS --unknown settings will need to confirm with wife  -Albuterol PRN  -On Flovent at home (not available here)  -Continue with o2 NC  -Monitor o2 sats    #Gout  -Colchicine 0.6mg daily  -Uric acid 11/12- 6.8     #Mood/Cognition  #Depression  -Paxil 30mg daily   Neuropsychology consult PRN    #Sleep:   Maintain quiet hours and low stim environment.  Melatonin 6mg HS PRN to maximize participation in therapy during the day.     #Pain Management:  Analgesic: Tylenol PRN    #GI/Bowel:  -Senna QHS  -Miralax BIDD  - Bisacodyl suppository QD PRN  GI ppx: Pantoprazole 40mg daily     #/Bladder:   - PVR x1 on admission (SC if > 400)    #Skin/Pressure Injury:   - Skin assessment on admission: intact    #Dysphagia:  Dysphagia: SLP consult for swallow function evaluation and treatment  S/p MBS on 11/12--> diet: Easy to chew; mild thick liquids- DASH  -1:1 feeds   [X] Aspiration Precautions  Nutrition consult   -F/u outpatient ENT    #Precautions / PROPHYLAXIS:   - Falls,   - Lungs: Aspiration, Incentive Spirometer   - DVT ppx: DAPT  - Pressure injury/Skin:  OOB to Chair, PT/OT      ---------------  Code Status: FULL  Emergency Contact:    Outpatient Follow-up (Specialty/Name of physician):  Dr. Cheng March  Neurology  12/24/24 appt     Cardiology    ENT  [phone]  --------------  Goals: Safe discharge to Home vs. TREV  Estimated Length of Stay:  21 days  Rehab Potential: Good  Medical Prognosis: Good  Estimated Disposition: Home with Home Care  ---------------    PRESCREEN COMPARISON:  I have reviewed the prescreen information and I have found no relevant changes between the preadmission screening and my post admission evaluation.    RATIONALE FOR INPATIENT ADMISSION: Patient demonstrates the following:  [X] Medically appropriate for rehabilitation admission  [X] Has attainable rehab goals with an appropriate initial discharge plan  [X]Has rehabilitation potential (expected to make a significant improvement within a reasonable period of time)  [X] Requires close medical management by a rehab physician, rehab nursing care, Hospitalist and comprehensive interdisciplinary team (including PT, OT and/or SLP, Prosthetics and Orthotics)     Assessment/Plan:  TARUN CRESPO is a 68 y/o M w/ PMHx HLD, depression, GERD, gout, colonic polyps,  congenital absence of one kidney, recent cholecystitis s/p cholecystectomy (6/24), RHETT on CPAP orginially presented to [facility] on 11/3 with aphasia, R sided weakness and R facial droop and vigorous cough x 2 weeks s/p COVID vaccine. 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  S/p L ICA terminus occulusion a/p L ICA thrombectomy, no stent, 5 passes TICI 2C  w/ underling L petrous ICA dissection on 11/3 w/ Dr. March. Now admitted to [facility] for functional deficits for initiation of a multidisciplinary rehab program with right hemiparesis, non-fluent aphasia, sensory impairment, dysphagia, and impaired functional mobility, transfers and ADLs.    #L MCA stroke w/ hemorrhagic conversion  #L petrous ICA dissection w/ pesudoaneurysm  -CTH 11/3: hyperdense appearance of L basal ganglia and L frontal lobe gyri, mild LCA edema 2/2 recent ischemia  -CTA H/N 11/3: no evidence of significant stenosis affecting the extra cranial carotid or vertebral arteries. No evidence of aneurysm or significant vascular stenosis at [place]  -Repeat CTH 11/4: post recent endovascular revascularization of the left internal carotid artery and left middle cerebral artery. Minimally less conspicuous hyperdense appearance of left basal ganglia and left frontal lobe gyri which may represent expected evolution of contrast staining  -MRI brain 11/5: evolving recent infarct in L ICA associated with hemorrhagic transformation of the L basal ganglia. Effacement of cerebral sulci with mass effect causing 0.6 mm rightward shift.   -CTH 11/9: evolving acute to subacute left MCA infarct with evolving left basal ganglia hemorrhagic transformation, new punctuate foci of increased hyperdensity. Stable mass effect and 5mm rightward midline shift  -CTA 11/9: stable left internal carotid artery pseudoaneurysm immediately inferior to the petrous portion of the L ICA at the area of focal dissection.  -S/p L ICA terminus occulusion a/p L ICA thrombectomy, no stent, 5 passes TICI 2C  w/ underling L petrous ICA dissection on 11/3 w/ Dr. March.   -A1c 5.0; LDL 60  -No statin indicated given low LDL  -Aspirin 325mg daily  -Plavix 75mg daily   - Rosuvastatin 5mg QD  - Lovenox 40 mg SubQ daily 11/15  -Will be on DAPT for 3 months and follow up with outpt neurology  -As per EPS consult at [place], less concern for cardioembolic source; would recommend oupatient f/u with no need for Zio Patch inpatient or upon discharge and to continue telemetry monitoring for evaluation of arrythmias while inpatient  - CD imaging from [place] uploaded to [facility] 11/15    Deficits: Dysarthria, apraxia of speech, aphasia, right hemiparesis   Comprehensive Multidisciplinary Rehab Program:  - Start comprehensive rehab program, 3 hours a day, 5 days a week.  - PT 1hr/day: Focused on improving strength, endurance, coordination, balance, functional mobility, and transfers  - OT 1hr/day: Focused on improving strength, fine motor skills, coordination, posture and ADLs.    - Speech Therapy 1hr/day: to diagnose and treat deficits in swallowing, cognition and communication.   - Activity Limitations: Decreased social, vocational and leisure activities, decreased self care and ADLs, decreased mobility, decreased ability to manage household and finances.   -Right resting hand splint 4hrs on/off during the day and on at night as tolerated    -----------------------------------------------------------------------------------  Concurrent Medical Problems    # R Soleal Vein DVT  - Duplex U/S showed DVT below the knee, R soleal vein thrombosis 11/14  - Aspirin 325 mg daily  - Plavix 75 mg daily  - Lovenox 40 mg SubQ daily 11/15    #Normocytic anemia (stable) vs Iron deficiency anemia   -Monitor H/H (11.2/34.2- on 11/13)  -F/u outpatient colonoscopy for hx of colon polps  -Transfuse <7 PRN    -Iron studies: Transferrin: 11, TIBC: 170, Iron: 18, Ferritin: 414    #Fevers  #Pneumonitis   -Fevers on 11/5 ; infectious work up negative  -Likely 2/2 chemical pneumonitis from aspiration of ice chips  -Blood cx negative  -MRSA/MSSA- negative  -S/p empiric Rocephin x1  -Monitor CBC, fevers    #RHETT  #URI   #Cough   -CXR- negative   -CPAP HS --unknown settings will need to confirm with wife  -Albuterol PRN  -On Flovent at home (not available here)  -Continue with o2 NC  -Monitor o2 sats    #Gout  -Colchicine 0.6mg daily  -Uric acid 11/12- 6.8     #Mood/Cognition  #Depression  -Paxil 30mg daily   Neuropsychology consult PRN    #Sleep:   - Maintain quiet hours and low stim environment.  - Switched from Melatonin 6mg HS PRN to scheduled at bedtime to maximize patient sleep and participation in therapy    #Pain Management:  Analgesic: Tylenol PRN    #GI/Bowel:  -Senna QHS  -Miralax BIDD  - Bisacodyl suppository QD PRN  GI ppx: Pantoprazole 40mg daily     #/Bladder  #Urinary Retention:   - PVR x1 on admission (SC if > 400)    #Skin/Pressure Injury:   - Skin assessment on admission: intact    #Dysphagia:  Dysphagia: SLP consult for swallow function evaluation and treatment  S/p MBS on 11/12--> diet: Easy to chew; mild thick liquids- DASH  -1:1 feeds   [X] Aspiration Precautions  Nutrition consult   -F/u outpatient ENT    #Precautions / PROPHYLAXIS:   - Falls,   - Lungs: Aspiration, Incentive Spirometer   - DVT ppx: DAPT  - Pressure injury/Skin:  OOB to Chair, PT/OT      ---------------  Code Status: FULL  Emergency Contact:    Outpatient Follow-up (Specialty/Name of physician):  Dr. Cheng March  Neurology  12/24/24 appt     Cardiology    ENT  [phone]  --------------  Goals: Safe discharge to Home vs. TREV  Estimated Length of Stay:  21 days  Rehab Potential: Good  Medical Prognosis: Good  Estimated Disposition: Home with Home Care  ---------------    PRESCREEN COMPARISON:  I have reviewed the prescreen information and I have found no relevant changes between the preadmission screening and my post admission evaluation.    RATIONALE FOR INPATIENT ADMISSION: Patient demonstrates the following:  [X] Medically appropriate for rehabilitation admission  [X] Has attainable rehab goals with an appropriate initial discharge plan  [X]Has rehabilitation potential (expected to make a significant improvement within a reasonable period of time)  [X] Requires close medical management by a rehab physician, rehab nursing care, Hospitalist and comprehensive interdisciplinary team (including PT, OT and/or SLP, Prosthetics and Orthotics)     Assessment/Plan:  TARUN CRESPO is a 70 y/o M w/ PMHx HLD, depression, GERD, gout, colonic polyps,  congenital absence of one kidney, recent cholecystitis s/p cholecystectomy (6/24), RHETT on CPAP orginially presented to [facility] on 11/3 with aphasia, R sided weakness and R facial droop and vigorous cough x 2 weeks s/p COVID vaccine. 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  S/p L ICA terminus occulusion a/p L ICA thrombectomy, no stent, 5 passes TICI 2C  w/ underling L petrous ICA dissection on 11/3 w/ Dr. March. Now admitted to [facility] for functional deficits for initiation of a multidisciplinary rehab program with right hemiparesis, non-fluent aphasia, sensory impairment, dysphagia, and impaired functional mobility, transfers and ADLs.    #L MCA stroke w/ hemorrhagic conversion  #L petrous ICA dissection w/ pesudoaneurysm  -CTH 11/3: hyperdense appearance of L basal ganglia and L frontal lobe gyri, mild LCA edema 2/2 recent ischemia  -CTA H/N 11/3: no evidence of significant stenosis affecting the extra cranial carotid or vertebral arteries. No evidence of aneurysm or significant vascular stenosis at [place] [facility] [place]  -Repeat CTH 11/4: post recent endovascular revascularization of the left internal carotid artery and left middle cerebral artery. Minimally less conspicuous hyperdense appearance of left basal ganglia and left frontal lobe gyri which may represent expected evolution of contrast staining  -MRI brain 11/5: evolving recent infarct in L ICA associated with hemorrhagic transformation of the L basal ganglia. Effacement of cerebral sulci with mass effect causing 0.6 mm rightward shift.   -CTH 11/9: evolving acute to subacute left MCA infarct with evolving left basal ganglia hemorrhagic transformation, new punctuate foci of increased hyperdensity. Stable mass effect and 5mm rightward midline shift  -CTA 11/9: stable left internal carotid artery pseudoaneurysm immediately inferior to the petrous portion of the L ICA at the area of focal dissection.  -S/p L ICA terminus occulusion a/p L ICA thrombectomy, no stent, 5 passes TICI 2C  w/ underling L petrous ICA dissection on 11/3 w/ Dr. March.   -A1c 5.0; LDL 60  -No statin indicated given low LDL  -Aspirin 325mg daily  -Plavix 75mg daily   - Rosuvastatin 5mg QD  - Lovenox 40 mg SubQ daily 11/15  -Will be on DAPT for 3 months and follow up with outpt neurology  -As per EPS consult at [place], less concern for cardioembolic source; would recommend oupatient f/u with no need for Zio Patch inpatient or upon discharge and to continue telemetry monitoring for evaluation of arrythmias while inpatient  - CD imaging from [place] uploaded to [place] 11/15    Deficits: Dysarthria, apraxia of speech, aphasia, right hemiparesis   Comprehensive Multidisciplinary Rehab Program:  - Start comprehensive rehab program, 3 hours a day, 5 days a week.  - PT 1hr/day: Focused on improving strength, endurance, coordination, balance, functional mobility, and transfers  - OT 1hr/day: Focused on improving strength, fine motor skills, coordination, posture and ADLs.    - Speech Therapy 1hr/day: to diagnose and treat deficits in swallowing, cognition and communication.   - Activity Limitations: Decreased social, vocational and leisure activities, decreased self care and ADLs, decreased mobility, decreased ability to manage household and finances.   -Right resting hand splint 4hrs on/off during the day and on at night as tolerated    -----------------------------------------------------------------------------------  Concurrent Medical Problems    # R Soleal Vein DVT  - Duplex U/S showed DVT below the knee, R soleal vein thrombosis 11/14  - Aspirin 325 mg daily  - Plavix 75 mg daily  - Lovenox 40 mg SubQ daily 11/15    #Normocytic anemia (stable) vs Iron deficiency anemia   -Monitor H/H (11.2/34.2- on 11/13)  -F/u outpatient colonoscopy for hx of colon polps  -Transfuse <7 PRN    -Iron studies: Transferrin: 11, TIBC: 170, Iron: 18, Ferritin: 414    #Fevers  #Pneumonitis   -Fevers on 11/5 ; infectious work up negative  -Likely 2/2 chemical pneumonitis from aspiration of ice chips  -Blood cx negative  -MRSA/MSSA- negative  -S/p empiric Rocephin x1  -Monitor CBC, fevers    #RHETT  #URI   #Cough   -CXR- negative   -CPAP HS 5 50% O2  -Albuterol PRN  -On Flovent at home (not available here)  -Continue with o2 NC  -Monitor o2 sats    #Gout  -Colchicine 0.6mg daily  -Uric acid 11/12- 6.8     #Mood/Cognition  #Depression  -Paxil 30mg daily   Neuropsychology consult PRN    #Sleep:   - Maintain quiet hours and low stim environment.  - Switched from Melatonin 6mg HS PRN to scheduled at bedtime to maximize patient sleep and participation in therapy 11/15    #Pain Management:  Analgesic: Tylenol PRN    #GI/Bowel:  -Senna QHS  -Miralax BIDD  - Bisacodyl suppository QD PRN  - GI ppx: Pantoprazole 40mg daily     #/Bladder  #Urinary Retention  - Patient unable to urinate, bladder volume per U/S 633 mL --> required intermittent catheterization 11/14  - Patient reports being able to spontaneously urinate today, PVR 49 mL with 550 mL voided 11/15      #Skin/Pressure Injury:   - Skin assessment on admission: intact    #Dysphagia:  Dysphagia: SLP consult for swallow function evaluation and treatment  S/p MBS on 11/12--> diet: Easy to chew; mild thick liquids- DASH  -1:1 feeds   [X] Aspiration Precautions  Nutrition consult   -F/u outpatient ENT    #Precautions / PROPHYLAXIS:   - Falls,   - Lungs: Aspiration, Incentive Spirometer   - DVT ppx: DAPT, Lovenox  - Pressure injury/Skin:  OOB to Chair, PT/OT      ---------------  Code Status: FULL  Emergency Contact:    Outpatient Follow-up (Specialty/Name of physician):  Dr. Cheng March  Neurology  12/24/24 appt     Cardiology    ENT  [phone]  --------------  Goals: Safe discharge to Home vs. TREV  Estimated Length of Stay:  21 days  Rehab Potential: Good  Medical Prognosis: Good  Estimated Disposition: Home with Home Care  ---------------    PRESCREEN COMPARISON:  I have reviewed the prescreen information and I have found no relevant changes between the preadmission screening and my post admission evaluation.    RATIONALE FOR INPATIENT ADMISSION: Patient demonstrates the following:  [X] Medically appropriate for rehabilitation admission  [X] Has attainable rehab goals with an appropriate initial discharge plan  [X]Has rehabilitation potential (expected to make a significant improvement within a reasonable period of time)  [X] Requires close medical management by a rehab physician, rehab nursing care, Hospitalist and comprehensive interdisciplinary team (including PT, OT and/or SLP, Prosthetics and Orthotics)     Assessment/Plan:  TARUN CRESPO is a 68 y/o M w/ PMHx HLD, depression, GERD, gout, colonic polyps,  congenital absence of one kidney, recent cholecystitis s/p cholecystectomy (6/24), RHETT on CPAP orginially presented to [facility] on 11/3 with aphasia, R sided weakness and R facial droop and vigorous cough x 2 weeks s/p COVID vaccine. 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  S/p L ICA terminus occulusion a/p L ICA thrombectomy, no stent, 5 passes TICI 2C  w/ underling L petrous ICA dissection on 11/3 w/ Dr. March. Now admitted to [facility] for functional deficits for initiation of a multidisciplinary rehab program with right hemiparesis, non-fluent aphasia, sensory impairment, dysphagia, and impaired functional mobility, transfers and ADLs.    #L MCA stroke w/ hemorrhagic conversion  #L petrous ICA dissection w/ pesudoaneurysm  -CTH 11/3: hyperdense appearance of L basal ganglia and L frontal lobe gyri, mild LCA edema 2/2 recent ischemia  -CTA H/N 11/3: no evidence of significant stenosis affecting the extra cranial carotid or vertebral arteries. No evidence of aneurysm or significant vascular stenosis at [place] [facility] [place]  -Repeat CTH 11/4: post recent endovascular revascularization of the left internal carotid artery and left middle cerebral artery. Minimally less conspicuous hyperdense appearance of left basal ganglia and left frontal lobe gyri which may represent expected evolution of contrast staining  -MRI brain 11/5: evolving recent infarct in L ICA associated with hemorrhagic transformation of the L basal ganglia. Effacement of cerebral sulci with mass effect causing 0.6 mm rightward shift.   -CTH 11/9: evolving acute to subacute left MCA infarct with evolving left basal ganglia hemorrhagic transformation, new punctuate foci of increased hyperdensity. Stable mass effect and 5mm rightward midline shift  -CTA 11/9: stable left internal carotid artery pseudoaneurysm immediately inferior to the petrous portion of the L ICA at the area of focal dissection.  -S/p L ICA terminus occulusion a/p L ICA thrombectomy, no stent, 5 passes TICI 2C  w/ underling L petrous ICA dissection on 11/3 w/ Dr. March.   -A1c 5.0; LDL 60  -No statin indicated given low LDL  -Aspirin 325mg daily  -Plavix 75mg daily   - Rosuvastatin 5mg QD  - Lovenox 40 mg SubQ daily 11/15  -Will be on DAPT for 3 months and follow up with outpt neurology  -As per EPS consult at [place], less concern for cardioembolic source; would recommend oupatient f/u with no need for Zio Patch inpatient or upon discharge and to continue telemetry monitoring for evaluation of arrythmias while inpatient  - CD imaging from [place] uploaded to [place] 11/15    Deficits: Dysarthria, apraxia of speech, aphasia, right hemiparesis   Comprehensive Multidisciplinary Rehab Program:  - Start comprehensive rehab program, 3 hours a day, 5 days a week.  - PT 1hr/day: Focused on improving strength, endurance, coordination, balance, functional mobility, and transfers  - OT 1hr/day: Focused on improving strength, fine motor skills, coordination, posture and ADLs.    - Speech Therapy 1hr/day: to diagnose and treat deficits in swallowing, cognition and communication.   - Activity Limitations: Decreased social, vocational and leisure activities, decreased self care and ADLs, decreased mobility, decreased ability to manage household and finances.   -Right resting hand splint 4hrs on/off during the day and on at night as tolerated    -----------------------------------------------------------------------------------  Concurrent Medical Problems    # R Soleal Vein DVT  - Duplex U/S showed DVT below the knee, R soleal vein thrombosis 11/14  - Aspirin 325 mg daily  - Plavix 75 mg daily  - Lovenox 40 mg SubQ daily 11/15    #Normocytic anemia (stable) vs Iron deficiency anemia   -Monitor H/H (11.2/34.2- on 11/13)  -F/u outpatient colonoscopy for hx of colon polps  -Transfuse <7 PRN    -Iron studies: Transferrin: 11, TIBC: 170, Iron: 18, Ferritin: 414    #Fevers  #Pneumonitis   -Fevers on 11/5 ; infectious work up negative  -Likely 2/2 chemical pneumonitis from aspiration of ice chips  -Blood cx negative  -MRSA/MSSA- negative  -S/p empiric Rocephin x1  -Monitor CBC, fevers    #RHETT  #URI   #Cough   -CXR- negative   -CPAP HS 5 50% O2  -Albuterol PRN  -On Flovent at home (not available here)  -Continue with o2 NC  -Monitor o2 sats    #Gout  -Colchicine 0.6mg daily  -Uric acid 11/12- 6.8     #Mood/Cognition  #Depression  -Paxil 30mg daily   Neuropsychology consult PRN    #Sleep:   - Maintain quiet hours and low stim environment.  - Switched from Melatonin 6mg HS PRN to scheduled at bedtime to maximize patient sleep and participation in therapy 11/15    #Pain Management:  Analgesic: Tylenol PRN    #GI/Bowel:  -Senna QHS  -Miralax BIDD  - Bisacodyl suppository QD PRN  - GI ppx: Pantoprazole 40mg daily     #/Bladder  #Urinary Retention  - Patient unable to urinate,  mL --> required intermittent catheterization 11/14  - Patient reports being able to spontaneously urinate today, PVR 49 mL with 550 mL voided 11/15      #Skin/Pressure Injury:   - Skin assessment on admission: intact    #Dysphagia:  Dysphagia: SLP consult for swallow function evaluation and treatment  S/p MBS on 11/12--> diet: Easy to chew; mild thick liquids- DASH  -1:1 feeds   [X] Aspiration Precautions  Nutrition consult   -F/u outpatient ENT    #Precautions / PROPHYLAXIS:   - Falls,   - Lungs: Aspiration, Incentive Spirometer   - DVT ppx: DAPT, Lovenox  - Pressure injury/Skin:  OOB to Chair, PT/OT      ---------------  Code Status: FULL  Emergency Contact:    Outpatient Follow-up (Specialty/Name of physician):  Dr. Cheng March  Neurology  12/24/24 appt     Cardiology    ENT  [phone]  --------------  Goals: Safe discharge to Home vs. TREV  Estimated Length of Stay:  21 days  Rehab Potential: Good  Medical Prognosis: Good  Estimated Disposition: Home with Home Care  ---------------    PRESCREEN COMPARISON:  I have reviewed the prescreen information and I have found no relevant changes between the preadmission screening and my post admission evaluation.    RATIONALE FOR INPATIENT ADMISSION: Patient demonstrates the following:  [X] Medically appropriate for rehabilitation admission  [X] Has attainable rehab goals with an appropriate initial discharge plan  [X]Has rehabilitation potential (expected to make a significant improvement within a reasonable period of time)  [X] Requires close medical management by a rehab physician, rehab nursing care, Hospitalist and comprehensive interdisciplinary team (including PT, OT and/or SLP, Prosthetics and Orthotics)     Assessment/Plan:  TARUN CRESPO is a 68 y/o M w/ PMHx HLD, depression, GERD, gout, colonic polyps,  congenital absence of one kidney, recent cholecystitis s/p cholecystectomy (6/24), RHETT on CPAP orginially presented to [facility] on 11/3 with aphasia, R sided weakness and R facial droop and vigorous cough x 2 weeks s/p COVID vaccine. 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  S/p L ICA terminus occulusion a/p L ICA thrombectomy, no stent, 5 passes TICI 2C  w/ underling L petrous ICA dissection on 11/3 w/ Dr. Macrh. Now admitted to [facility] for functional deficits for initiation of a multidisciplinary rehab program with right hemiparesis, non-fluent aphasia, sensory impairment, dysphagia, and impaired functional mobility, transfers and ADLs.    #L MCA stroke w/ hemorrhagic conversion  #L petrous ICA dissection w/ pesudoaneurysm  -CTH 11/3: hyperdense appearance of L basal ganglia and L frontal lobe gyri, mild LCA edema 2/2 recent ischemia  -CTA H/N 11/3: no evidence of significant stenosis affecting the extra cranial carotid or vertebral arteries. No evidence of aneurysm or significant vascular stenosis at [place] [facility] [place]  -Repeat CTH 11/4: post recent endovascular revascularization of the left internal carotid artery and left middle cerebral artery. Minimally less conspicuous hyperdense appearance of left basal ganglia and left frontal lobe gyri which may represent expected evolution of contrast staining  -MRI brain 11/5: evolving recent infarct in L ICA associated with hemorrhagic transformation of the L basal ganglia. Effacement of cerebral sulci with mass effect causing 0.6 mm rightward shift.   -CTH 11/9: evolving acute to subacute left MCA infarct with evolving left basal ganglia hemorrhagic transformation, new punctuate foci of increased hyperdensity. Stable mass effect and 5mm rightward midline shift  -CTA 11/9: stable left internal carotid artery pseudoaneurysm immediately inferior to the petrous portion of the L ICA at the area of focal dissection.  -S/p L ICA terminus occulusion a/p L ICA thrombectomy, no stent, 5 passes TICI 2C  w/ underling L petrous ICA dissection on 11/3 w/ Dr. March.   -A1c 5.0; LDL 60  -No statin indicated given low LDL  -Aspirin 325mg daily  -Plavix 75mg daily   - Rosuvastatin 5mg QD  - Lovenox 40 mg SubQ daily 11/15-- Records from [place] Reviewed-- pt. was resumed on Lovenox 40mg 11/12  -Will be on DAPT for 3 months and follow up with outpt neurology  -As per EPS consult at [place], less concern for cardioembolic source; would recommend oupatient f/u with no need for Zio Patch inpatient or upon discharge and to continue telemetry monitoring for evaluation of arrythmias while inpatient  - CD imaging from [place] uploaded to [facility] 11/15    Deficits: Dysarthria, apraxia of speech, aphasia, right hemiparesis   Comprehensive Multidisciplinary Rehab Program:  - Start comprehensive rehab program, 3 hours a day, 5 days a week.  - PT 1hr/day: Focused on improving strength, endurance, coordination, balance, functional mobility, and transfers  - OT 1hr/day: Focused on improving strength, fine motor skills, coordination, posture and ADLs.    - Speech Therapy 1hr/day: to diagnose and treat deficits in swallowing, cognition and communication.   - Activity Limitations: Decreased social, vocational and leisure activities, decreased self care and ADLs, decreased mobility, decreased ability to manage household and finances.   -Right resting hand splint 4hrs on/off during the day and on at night as tolerated    -----------------------------------------------------------------------------------  Concurrent Medical Problems    # R Soleal Vein DVT  - Duplex U/S showed DVT below the knee, R soleal vein thrombosis 11/14  - Aspirin 325 mg daily  - Plavix 75 mg daily  - Lovenox 40 mg SubQ daily 11/15  --f/u weekly dopplers  --Spoke with pt's wife regarding DVT, f/u CT head from 11/14 and Lovenox treatment    #Normocytic anemia (stable) vs Iron deficiency anemia   -Monitor H/H (11.2/34.2- on 11/13)  --H&H 12.2/37.2 on 11/14  -F/u outpatient colonoscopy for hx of colon polps  -Transfuse <7 PRN    -Iron studies: Transferrin: 11, TIBC: 170, Iron: 18, Ferritin: 414      #RHETT  #URI   #Cough   -CXR- negative   -CPAP HS 5 50% O2  -Albuterol PRN  -On Flovent at home (not available here)  -Continue with o2 NC  -Monitor o2 sats    #Gout  -Colchicine 0.6mg daily  -Uric acid 11/12- 6.8     #Mood/Cognition  #Depression  -Paxil 30mg daily   Neuropsychology consult PRN    #Sleep:   - Maintain quiet hours and low stim environment.  - Switched from Melatonin 6mg HS PRN to scheduled at bedtime to maximize patient sleep and participation in therapy 11/15    #Pain Management:  Analgesic: Tylenol PRN    #GI/Bowel:  -Senna QHS  -Miralax BIDD  - Bisacodyl suppository QD PRN  - GI ppx: Pantoprazole 40mg daily     #/Bladder  #Urinary Retention  - Patient unable to urinate,  mL --> required intermittent catheterization 11/14  - Patient reports being able to spontaneously urinate today, PVR 49 mL with 550 mL voided 11/15      #Skin/Pressure Injury:   - Skin assessment on admission: intact    #Dysphagia:  Dysphagia: SLP consult for swallow function evaluation and treatment  S/p MBS on 11/12--> diet: Easy to chew; mild thick liquids- DASH  -1:1 feeds   [X] Aspiration Precautions  Nutrition consult   -F/u outpatient ENT    #Precautions / PROPHYLAXIS:   - Falls,   - Lungs: Aspiration, Incentive Spirometer   - DVT ppx: DAPT, Lovenox  - Pressure injury/Skin:  OOB to Chair, PT/OT      ---------------  Code Status: FULL  Emergency Contact:    Outpatient Follow-up (Specialty/Name of physician):  Dr. Cheng March  Neurology  12/24/24 appt     Cardiology    ENT  [phone]  --------------  Goals: Safe discharge to Home vs. TREV  Estimated Length of Stay:  21 days  Rehab Potential: Good  Medical Prognosis: Good  Estimated Disposition: Home with Home Care  ---------------

## 2024-11-15 NOTE — PROGRESS NOTE ADULT - ASSESSMENT
TARUN CRESPO is a 70 y/o M w/ PMHx HLD, depression, GERD, gout, colonic polyps,  congenital absence of one kidney, recent cholecystitis s/p cholecystectomy (6/24), RHETT on CPAP orginially presented to [facility] on 11/3 with aphasia, R sided weakness and R facial droop and vigorous cough x 2 weeks s/p COVID vaccine. 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  S/p L ICA terminus occulusion a/p L ICA thrombectomy, no stent, 5 passes TICI 2C  w/ underling L petrous ICA dissection on 11/3 w/ Dr. March. Now admitted to [facility] for functional deficits for initiation of a multidisciplinary rehab program     #L MCA stroke w/ hemorrhagic conversion  #L petrous ICA dissection w/ pesudoaneurysm  -CTH 11/3: hyperdense appearance of L basal ganglia and L frontal lobe gyri, mild LCA edema 2/2 recent ischemia  -CTA H/N 11/3: no evidence of significant stenosis affecting the extra cranial carotid or vertebral arteries. No evidence of aneurysm or significant vascular stenosis at [place] [facility] [place]  -Repeat CTH 11/4: post recent endovascular revascularization of the left internal carotid artery and left middle cerebral artery. Minimally less conspicuous hyperdense appearance of left basal ganglia and left frontal lobe gyri which may represent expected evolution of contrast staining  -MRI brain 11/5: evolving recent infarct in L ICA associated with hemorrhagic transformation of the L basal ganglia. Effacement of cerebral sulci with mass effect causing 0.6 mm rightward shift.   -CTH 11/9: evolving acute to subacute left MCA infarct with evolving left basal ganglia hemorrhagic transformation, new punctuate foci of increased hyperdensity. Stable mass effect and 5mm rightward midline shift  -CTA 11/9: stable left internal carotid artery pseudoaneurysm immediately inferior to the petrous portion of the L ICA at the area of focal dissection.  -S/p L ICA terminus occulusion a/p L ICA thrombectomy, no stent, 5 passes TICI 2C  w/ underling L petrous ICA dissection on 11/3 w/ Dr. March.   -A1c 5.0; LDL 60  -No statin indicated given low LDL  -Aspirin 325mg daily  -Plavix 75mg daily   rosuvastatin 5mg QD  -Will be on DAPT for 3 months and follow up with outpt neurology  -F/u outpatient caridology for Zio-patch to r/o cardioembolic source --ILR to be considered as outpatient    # Normocytic anemia (stable)   -Monitor H/H (12.2/37.2- on 11/14)  -F/u outpatient colonoscopy for hx of colon polps  -Transfuse <7 PRN    -Iron studies: Transferrin: 11, TIBC: 170, Iron: 18, Ferritin: 414    # HTN   cw losartan and norvsac   monitor NP      #Fevers  #Pneumonitis   -Fevers on 11/5 ; infectious work up negative  -Likely 2/2 chemical pneumonitis from aspiration of ice chips  -Blood cx negative  -MRSA/MSSA- negative  -S/p empiric Rocephin x1  -Monitor CBC, fevers    #RHETT  #URI   #Cough   -CXR- negative   -CPAP HS --unknown settings will need to confirm with wife  -Albuterol PRN  -On Flovent at home (not available here)  -Continue with o2 NC  -Monitor o2 sats  - supplemental O2 PRN      #Gout  -Colchicine 0.6mg daily  -Uric acid 11/12- 6.8     DVT   US showing Acute deep venous thrombosis: below the knee. Right soleal vein thrombosis.  optimally patient should be on AC to treat dvt - would defer to rehab what agent  to use to rehab team given MCA stroke w/ hemorrhagic conversion and patient being on ASA/ Plavix  consider heme eval as well     #Depression  -Paxil 30mg daily     GI ppx: Pantoprazole 40mg daily

## 2024-11-15 NOTE — PROGRESS NOTE ADULT - SUBJECTIVE AND OBJECTIVE BOX
PROGRESS NOTE:     Patient is a 69y old  Male who presents with a chief complaint of L MCA infarct w/ hemorrhagic conversion (14 Nov 2024 14:00)      SUBJECTIVE / OVERNIGHT EVENTS: pt was seen and evaluated today  reports coughing- unchanged from before  no fevers reported       ADDITIONAL REVIEW OF SYSTEMS: as per HPI    MEDICATIONS  (STANDING):  amLODIPine   Tablet 5 milliGRAM(s) Oral daily  aspirin 325 milliGRAM(s) Oral daily  cholecalciferol 1000 Unit(s) Oral daily  clopidogrel Tablet 75 milliGRAM(s) Oral daily  colchicine 0.6 milliGRAM(s) Oral daily  losartan 50 milliGRAM(s) Oral daily  pantoprazole    Tablet 40 milliGRAM(s) Oral before breakfast  PARoxetine 30 milliGRAM(s) Oral daily  polyethylene glycol 3350 17 Gram(s) Oral two times a day  rosuvastatin 5 milliGRAM(s) Oral daily    MEDICATIONS  (PRN):  acetaminophen     Tablet .. 650 milliGRAM(s) Oral every 6 hours PRN Mild Pain (1 - 3)  albuterol    90 MICROgram(s) HFA Inhaler 2 Puff(s) Inhalation every 6 hours PRN Shortness of Breath and/or Wheezing  bisacodyl Suppository 10 milliGRAM(s) Rectal daily PRN Constipation  melatonin 6 milliGRAM(s) Oral at bedtime PRN Insomnia  senna 2 Tablet(s) Oral at bedtime PRN Constipation      CAPILLARY BLOOD GLUCOSE        I&O's Summary    14 Nov 2024 07:01  -  15 Nov 2024 07:00  --------------------------------------------------------  IN: 0 mL / OUT: 550 mL / NET: -550 mL        PHYSICAL EXAM:  Vital Signs Last 24 Hrs  T(C): 36.6 (15 Nov 2024 09:49), Max: 36.6 (14 Nov 2024 19:33)  T(F): 97.9 (15 Nov 2024 09:49), Max: 97.9 (15 Nov 2024 09:49)  HR: 83 (15 Nov 2024 09:49) (60 - 83)  BP: 112/75 (15 Nov 2024 09:49) (112/75 - 115/70)  BP(mean): --  RR: 14 (15 Nov 2024 09:49) (14 - 15)  SpO2: 94% (15 Nov 2024 09:49) (94% - 95%)    Parameters below as of 15 Nov 2024 09:49  Patient On (Oxygen Delivery Method): nasal cannula  O2 Flow (L/min): 2      Physical Exam: Constitutional - NAD, Comfortable   HEENT - NCAT, EOMI   Neck - Supple, No limited ROM  Chest - good chest expansion, good respiratory effort, CTAB    Cardio - warm and well perfused, RRR, no murmur   Abdomen -  Soft, NTND   Extremities - No peripheral edema, No calf tenderness    Neurologic Exam: Awake, Alert,   + Aphasia--Nonfluent,  + dysarthria,    Psychiatric - Mood stable, Affect WNL   Skin on admission: intact      LABS:                        12.2   5.26  )-----------( 279      ( 14 Nov 2024 06:23 )             37.2     11-14    140  |  104  |  21  ----------------------------<  97  4.0   |  26  |  0.96    Ca    8.9      14 Nov 2024 06:23    TPro  6.8  /  Alb  2.7[L]  /  TBili  0.5  /  DBili  x   /  AST  37  /  ALT  29  /  AlkPhos  51  11-14          Urinalysis Basic - ( 14 Nov 2024 06:23 )    Color: x / Appearance: x / SG: x / pH: x  Gluc: 97 mg/dL / Ketone: x  / Bili: x / Urobili: x   Blood: x / Protein: x / Nitrite: x   Leuk Esterase: x / RBC: x / WBC x   Sq Epi: x / Non Sq Epi: x / Bacteria: x      case discussed during IDR and with consultants

## 2024-11-16 PROCEDURE — 99232 SBSQ HOSP IP/OBS MODERATE 35: CPT

## 2024-11-16 RX ORDER — ATROPINE SULFATE 1 %
1 DROPS OPHTHALMIC (EYE) EVERY 8 HOURS
Refills: 0 | Status: DISCONTINUED | OUTPATIENT
Start: 2024-11-16 | End: 2024-12-05

## 2024-11-16 RX ORDER — ACETAMINOPHEN, DIPHENHYDRAMINE HCL, PHENYLEPHRINE HCL 325; 25; 5 MG/1; MG/1; MG/1
9 TABLET ORAL AT BEDTIME
Refills: 0 | Status: DISCONTINUED | OUTPATIENT
Start: 2024-11-16 | End: 2024-12-05

## 2024-11-16 RX ADMIN — ROSUVASTATIN CALCIUM 5 MILLIGRAM(S): 5 TABLET, FILM COATED ORAL at 12:51

## 2024-11-16 RX ADMIN — LOSARTAN POTASSIUM 50 MILLIGRAM(S): 100 TABLET, FILM COATED ORAL at 05:52

## 2024-11-16 RX ADMIN — Medication 1000 UNIT(S): at 12:51

## 2024-11-16 RX ADMIN — Medication 2 PUFF(S): at 15:18

## 2024-11-16 RX ADMIN — Medication 325 MILLIGRAM(S): at 12:49

## 2024-11-16 RX ADMIN — POLYETHYLENE GLYCOL 3350 17 GRAM(S): 17 POWDER, FOR SOLUTION ORAL at 18:28

## 2024-11-16 RX ADMIN — ENOXAPARIN SODIUM 40 MILLIGRAM(S): 30 INJECTION SUBCUTANEOUS at 16:38

## 2024-11-16 RX ADMIN — Medication 2 PUFF(S): at 08:16

## 2024-11-16 RX ADMIN — Medication 1 DROP(S): at 16:36

## 2024-11-16 RX ADMIN — Medication 0.6 MILLIGRAM(S): at 12:50

## 2024-11-16 RX ADMIN — AMLODIPINE BESYLATE 5 MILLIGRAM(S): 10 TABLET ORAL at 05:52

## 2024-11-16 RX ADMIN — ACETAMINOPHEN, DIPHENHYDRAMINE HCL, PHENYLEPHRINE HCL 9 MILLIGRAM(S): 325; 25; 5 TABLET ORAL at 21:29

## 2024-11-16 RX ADMIN — PANTOPRAZOLE SODIUM 40 MILLIGRAM(S): 40 TABLET, DELAYED RELEASE ORAL at 05:52

## 2024-11-16 RX ADMIN — PAROXETINE HYDROCHLORIDE HEMIHYDRATE 30 MILLIGRAM(S): 37.5 TABLET, FILM COATED, EXTENDED RELEASE ORAL at 12:51

## 2024-11-16 RX ADMIN — CLOPIDOGREL 75 MILLIGRAM(S): 75 TABLET, FILM COATED ORAL at 12:49

## 2024-11-16 NOTE — PROGRESS NOTE ADULT - ASSESSMENT
68 y/o M w/ PMHx HLD, depression, GERD, gout, colonic polyps,  congenital absence of one kidney, recent cholecystitis s/p cholecystectomy (6/24), RHETT on CPAP orginially presented to [facility] on 11/3/24 with 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  S/p L ICA terminus occulusion a/p L ICA thrombectomy,  w/ underling L petrous ICA dissection     # L MCA stroke w/ hemorrhagic conversion  # L petrous ICA dissection w/ pesudoaneurysm  - CTH 11/9: evolving acute to subacute left MCA infarct with evolving left basal ganglia hemorrhagic transformation, new punctuate foci of increased hyperdensity. Stable mass effect and 5mm rightward midline shift  - CTA 11/9: stable left internal carotid artery pseudoaneurysm immediately inferior to the petrous portion of the L ICA at the area of focal dissection.  - A1c 5.0; LDL 60.   - Aspirin 325mg daily  - Plavix 75mg daily   - Rosuvastatin 5mg QD  - outpatient cardiology f/u for w/u r/o cardioembolic source  - continue comprehensive rehab program, 3 hours a day, 5 days a week.    # R Soleal Vein DVT  - Duplex U/S showed DVT below the knee, R soleal vein thrombosis 11/14  - Lovenox 40 mg SubQ daily 11/15  - f/u weekly dopplers    # Normocytic anemia (stable) vs Iron deficiency anemia   - F/u outpatient colonoscopy for hx of colon polps  - Transfuse <7 PRN    - Iron studies: Transferrin: 11, TIBC: 170, Iron: 18, Ferritin: 414  - monitor H/H. Hgb 12.2 11/14 stable    # RHETT  # URI   # Cough   - CXR- negative   - CPAP HS 5 50% O2. Poor compliance  - Albuterol PRN    # Gout  - Colchicine 0.6mg daily  - Uric acid 11/12- 6.8     # Mood/Cognition  # Depression  - Paxil 30mg daily     # Sleep:   - Increase melatonin 9 qhs 11/16    # Pain Management:  - Tylenol PRN    # GI/Bowel:  - Senna QHS  - Miralax BIDD  - Bisacodyl suppository QD PRN  - GI ppx: Pantoprazole 40mg daily     # /Bladder  # Urinary Retention  - monitor bladder scan, IC for PVR >350 ml    # Dysphagia:  - S/p MBS on 11/12--> diet: Easy to chew; mild thick liquids- DASH  -1:1 feeds  - doing well during feeding 11/16, no coughing, good intake and trggering swallow      ---------------  Code Status: FULL  Emergency Contact:    Outpatient Follow-up (Specialty/Name of physician):  Dr. Cheng March  Neurology  12/24/24 appt     Cardiology    ENT  [phone]     70 y/o M w/ PMHx HLD, depression, GERD, gout, colonic polyps,  congenital absence of one kidney, recent cholecystitis s/p cholecystectomy (6/24), RHETT on CPAP orginially presented to [facility] on 11/3/24 with 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  S/p L ICA terminus occulusion a/p L ICA thrombectomy,  w/ underling L petrous ICA dissection     # L MCA stroke w/ hemorrhagic conversion  # L petrous ICA dissection w/ pesudoaneurysm  - CTH 11/9: evolving acute to subacute left MCA infarct with evolving left basal ganglia hemorrhagic transformation, new punctuate foci of increased hyperdensity. Stable mass effect and 5mm rightward midline shift  - CTA 11/9: stable left internal carotid artery pseudoaneurysm immediately inferior to the petrous portion of the L ICA at the area of focal dissection.  - A1c 5.0; LDL 60.   - Aspirin 325mg daily  - Plavix 75mg daily   - Rosuvastatin 5mg QD  - outpatient cardiology f/u for w/u r/o cardioembolic source  - continue comprehensive rehab program, 3 hours a day, 5 days a week.    # R Soleal Vein DVT  - Duplex U/S showed DVT below the knee, R soleal vein thrombosis 11/14  - Lovenox 40 mg SubQ daily 11/15  - f/u weekly dopplers    # Normocytic anemia (stable) vs Iron deficiency anemia   - F/u outpatient colonoscopy for hx of colon polps  - Transfuse <7 PRN    - Iron studies: Transferrin: 11, TIBC: 170, Iron: 18, Ferritin: 414  - monitor H/H. Hgb 12.2 11/14 stable    # RHETT  # URI   # Cough   - CXR- negative   - CPAP HS 5 50% O2. Poor compliance  - Albuterol PRN    # Gout  - Colchicine 0.6mg daily  - Uric acid 11/12- 6.8     # Mood/Cognition  # Depression  - Paxil 30mg daily     # Sleep:   - Increase melatonin 9 qhs 11/16    # Pain Management:  - Tylenol PRN    # GI/Bowel:  - Senna QHS  - Miralax BIDD  - Bisacodyl suppository QD PRN  - GI ppx: Pantoprazole 40mg daily     # /Bladder  # Urinary Retention  - monitor bladder scan, IC for PVR >350 ml    # Dysphagia:  - S/p MBS on 11/12--> diet: Easy to chew; mild thick liquids- DASH  -1:1 feeds        ---------------  Code Status: FULL  Emergency Contact:    Outpatient Follow-up (Specialty/Name of physician):  Dr. Cheng March  Neurology  12/24/24 appt     Cardiology    ENT  [phone]

## 2024-11-16 NOTE — CONSULT NOTE ADULT - ASSESSMENT
Assessment  1. Hypoxemia suspect from atelectasis, RHETT and increased upper airway secretions  2. s/p L MCA CVA with hemorragic conversion  3. Obesity with RHETT  4. R soleal Vein DVT     Plan  n/c o2 to maintain sat  taper to 1 L and maintain sat > 90%  As has upper airway secretions suspect will benefit with atropine SL to dry mouth   may help cough and secretions  CPAP use as per patient desire, as unable to pull off mask, may benefit from only n/c o2 QHS  aspiration precautions  Pulmonary toilet as needed  will follow  continue DVT ppx dose Lovenox for R soleal vein dvt, repeat US LE 1 week to eval for propagation. 
TARUN CRESPO is a 70 y/o M w/ PMHx HLD, depression, GERD, gout, colonic polyps,  congenital absence of one kidney, recent cholecystitis s/p cholecystectomy (6/24), RHETT on CPAP orginially presented to [facility] on 11/3 with aphasia, R sided weakness and R facial droop and vigorous cough x 2 weeks s/p COVID vaccine. 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  S/p L ICA terminus occulusion a/p L ICA thrombectomy, no stent, 5 passes TICI 2C  w/ underling L petrous ICA dissection on 11/3 w/ Dr. March. Now admitted to [facility] for functional deficits for initiation of a multidisciplinary rehab program consisting focused on functional mobility, transfers and ADLs.      #L MCA stroke w/ hemorrhagic conversion  #L petrous ICA dissection w/ pesudoaneurysm  -CTH 11/3: hyperdense appearance of L basal ganglia and L frontal lobe gyri, mild LCA edema 2/2 recent ischemia  -CTA H/N 11/3: no evidence of significant stenosis affecting the extra cranial carotid or vertebral arteries. No evidence of aneurysm or significant vascular stenosis at [place] [facility] [place]  -Repeat CTH 11/4: post recent endovascular revascularization of the left internal carotid artery and left middle cerebral artery. Minimally less conspicuous hyperdense appearance of left basal ganglia and left frontal lobe gyri which may represent expected evolution of contrast staining  -MRI brain 11/5: evolving recent infarct in L ICA associated with hemorrhagic transformation of the L basal ganglia. Effacement of cerebral sulci with mass effect causing 0.6 mm rightward shift.   -CTH 11/9: evolving acute to subacute left MCA infarct with evolving left basal ganglia hemorrhagic transformation, new punctuate foci of increased hyperdensity. Stable mass effect and 5mm rightward midline shift  -CTA 11/9: stable left internal carotid artery pseudoaneurysm immediately inferior to the petrous portion of the L ICA at the area of focal dissection.  -S/p L ICA terminus occulusion a/p L ICA thrombectomy, no stent, 5 passes TICI 2C  w/ underling L petrous ICA dissection on 11/3 w/ Dr. March.   -A1c 5.0; LDL 60  -No statin indicated given low LDL  -Aspirin 325mg daily  -Plavix 75mg daily   rosuvastatin 5mg QD  -Will be on DAPT for 3 months and follow up with outpt neurology  -F/u outpatient caridology for Zio-patch to r/o cardioembolic source --ILR to be considered as outpatient    # Normocytic anemia (stable)   -Monitor H/H (12.2/37.2- on 11/14)  -F/u outpatient colonoscopy for hx of colon polps  -Transfuse <7 PRN    -Iron studies: Transferrin: 11, TIBC: 170, Iron: 18, Ferritin: 414    # HTN   cw losartan and norvsac   monitor NP      #Fevers  #Pneumonitis   -Fevers on 11/5 ; infectious work up negative  -Likely 2/2 chemical pneumonitis from aspiration of ice chips  -Blood cx negative  -MRSA/MSSA- negative  -S/p empiric Rocephin x1  -Monitor CBC, fevers    #RHETT  #URI   #Cough   -CXR- negative   -CPAP HS --unknown settings will need to confirm with wife  -Albuterol PRN  -On Flovent at home (not available here)  -Continue with o2 NC  -Monitor o2 sats  - supplemental O2 PRN      #Gout  -Colchicine 0.6mg daily  -Uric acid 11/12- 6.8     #Depression  -Paxil 30mg daily     GI ppx: Pantoprazole 40mg daily

## 2024-11-16 NOTE — CONSULT NOTE ADULT - SUBJECTIVE AND OBJECTIVE BOX
PULMONARY CONSULT  Location of Patient :  [facility] 0[facility] D1 ( [facility])       Initial HPI on admission:  HPI:  70 y/o M w/ PMHx HLD, depression, GERD, gout, colonic polyps,  congenital absence of one kidney, recent cholecystitis s/p cholecystectomy (6/24), RHETT on CPAP orginially presented to [facility] on 11/3 with aphasia, R sided weakness and R facial droop and vigorous cough x 2 weeks s/p COVID vaccine. LKW 2045 on 11/2. 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 Patient transferred to [facility] [place] for mechanical thrombectomy. S/p L ICA terminus occulusion a/p L ICA thrombectomy, no stent, 5 passes TICI 2C  w/ underling L petrous ICA dissection on 11/3 w/ Dr. March. Post op course c/b slight pseudoaneurysm enlargement w/ dissection distal embolisim into the terminal ICA seen on 11/4 CTA and hemorrhagic transformation of L MCA infarct.   Of note, hospital course also significant for fevers, suspect aspiration pneumonitis treated with Rocephin x 1, and dysphagia requiring easy to chew,  with mod thick liquids. EP was consulted with concerns of cardioembolic source for infarct, recommended to f/u outpatient for Zio patch.   Patient had an MBS on 11/12 which showed moderate oropharyngeal dysphagia, remarkable for delayed/reduced laryngeal vestibular closure, pharyngeal trigger delay and reduced hyolaryngeal elevation/excursion.  There was deep laryngeal penetration and possibly aspiration of thin liquid.  Patient was cleared for easy to chew GI diet with mildly thick liquids.    Patient optimized and was evaluated by PM&R and therapy for functional deficits, gait/ADL impairments and acute rehabilitation was recommended. Patient was cleared for discharge to [facility] IRU on 11/13/24.  (13 Nov 2024 15:44)      BRIEF HOSPITAL COURSE:   Pulmonary consult called aps patient has hypoxia requiring n/c o2  patient has been refusing his CPAP and now on n/c  patient aphasic and history obtained with yes / no questions  when seen patient on 2L n/c sat 97, slowly decreased to 92% on RA, but with cough improved  patient has been complaining of increasing cough/secretions.   states has thin cough worsens when laying flat and with food  no cp, sob, palp  denies history of pulmonary problems in past      PAST MEDICAL & SURGICAL HISTORY:  GERD (gastroesophageal reflux disease)  Colon polyps  Major depression  Gout  Hypercholesteremia  RHETT on CPAP  S/P cholecystectomy  S/P cataract surgery  S/P skin cancer resection  H/O total hip arthroplasty        Allergies  penicillin (Swelling)      FAMILY HISTORY:  FH: arthritis (Father, Mother)  FH: cancer (Father, Mother)  FH: depression (Mother)      Social history: Social History:  SOCIAL HISTORY  Smoking - Denies  EtOH - Sober x 26 years  Drugs - h/o cocaine-- last 26 years ago  Education: Some college  Employment:  at cin[employer]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     FUNCTIONAL HISTORY  Lives with wife, in a private condo, 3-4 steps to elevator. PTA, independent without AD.        Review of Systems: as stated above, limited history     Medications:  MEDICATIONS  (STANDING):  albuterol    90 MICROgram(s) HFA Inhaler 2 Puff(s) Inhalation every 6 hours  amLODIPine   Tablet 5 milliGRAM(s) Oral daily  aspirin 325 milliGRAM(s) Oral daily  cholecalciferol 1000 Unit(s) Oral daily  clopidogrel Tablet 75 milliGRAM(s) Oral daily  colchicine 0.6 milliGRAM(s) Oral daily  enoxaparin Injectable 40 milliGRAM(s) SubCutaneous every 24 hours  losartan 50 milliGRAM(s) Oral daily  melatonin 9 milliGRAM(s) Oral at bedtime  pantoprazole    Tablet 40 milliGRAM(s) Oral before breakfast  PARoxetine 30 milliGRAM(s) Oral daily  polyethylene glycol 3350 17 Gram(s) Oral two times a day  rosuvastatin 5 milliGRAM(s) Oral daily    MEDICATIONS  (PRN):  acetaminophen     Tablet .. 650 milliGRAM(s) Oral every 6 hours PRN Mild Pain (1 - 3)  bisacodyl Suppository 10 milliGRAM(s) Rectal daily PRN Constipation  senna 2 Tablet(s) Oral at bedtime PRN Constipation         Heme Medications   clopidogrel Tablet 75 milliGRAM(s) Oral daily, 11-14-24 @ 00:00  enoxaparin Injectable 40 milliGRAM(s) SubCutaneous every 24 hours, 11-15-24 @ 12:31      GI Medications  bisacodyl Suppository 10 milliGRAM(s) Rectal daily, 11-13-24 @ 21:43, Routine PRN  pantoprazole    Tablet 40 milliGRAM(s) Oral before breakfast, 11-13-24 @ 21:44, Routine  polyethylene glycol 3350 17 Gram(s) Oral two times a day, 11-13-24 @ 21:45, Routine  senna 2 Tablet(s) Oral at bedtime, 11-13-24 @ 21:53, Routine PRN        Home Medications:  Last Order Reconciliation Date: 11-14-24 @ 00:01 (Admission Reconciliation)           RADIOLOGY  CXR:  < from: Xray Chest 1 View- PORTABLE-Urgent (Xray Chest 1 View- PORTABLE-Urgent .) (11.14.24 @ 15:14) >    ACC: [id number] EXAM:  XR CHEST PORTABLE URGENT 1V   ORDERED BY:  NATANAEL RODGERS     PROCEDURE DATE:  11/14/2024          INTERPRETATION:  TIME OF EXAM: November 14, 2024 at 2:50 PM.    CLINICAL INFORMATION: Hypoxia and cough. History of CVA.    COMPARISON:  None available    TECHNIQUE:   AP Portable chest x-ray.    INTERPRETATION:    Heart size and the mediastinum cannot be accurately evaluated on this   projection.  Mildly elevated right hemidiaphragm.  The right lung is clear.  Suspicion for left lower/retrocardiac opacity.  No pleural effusion or pneumothorax.  There is osteoarthritic degenerative change of the spine.    IMPRESSION:  Suspicion for left lower/retrocardiac opacity, possibly   atelectasis or infection. Repeat chest x-ray,preferably PA and lateral   if clinically feasible, can be obtained, if felt to be clinically   indicated.    --- End of Report ---      < end of copied text >     < from: US Duplex Venous Lower Ext Complete, Bilateral (11.14.24 @ 16:19) >    IMPRESSION:  Acute deep venous thrombosis: below the knee.    Right soleal vein thrombosis.    Findings were discussed with nurse Corrine [phone] 11/14/2024 4:26 PM by   Dr. Diaz with read back confirmation.    --- End of Report ---      < end of copied text >      VITALS:  T(C): 36.9 (11-15-24 @ 19:46), Max: 36.9 (11-15-24 @ 19:46)  T(F): 98.5 (11-15-24 @ 19:46), Max: 98.5 (11-15-24 @ 19:46)  HR: 61 (11-16-24 @ 05:51) (61 - 61)  BP: 117/73 (11-16-24 @ 05:51) (117/73 - 122/78)  BP(mean): --  ABP: --  ABP(mean): --  RR: 15 (11-15-24 @ 19:46) (15 - 15)  SpO2: 96% (11-15-24 @ 19:46) (96% - 96%)  CVP(mm Hg): --  CVP(cm H2O): --    Ins and Outs       Height (cm): 188 (11-13-24 @ 21:34)  Weight (kg): 102 (11-13-24 @ 21:34)  BMI (kg/m2): 28.9 (11-13-24 @ 21:34)        I&O's Detail      Physical Examination:  GENERAL:               Alert, Oriented, No acute distress.    HEENT:                    Right facial droop No JVD, dry MM  PULM:                     Bilateral air entry, transmitted sounds to auscultation bilaterally, no significant sputum production, + Rales, No Rhonchi, No Wheezing  CVS:                         S1, S2,  No Murmur  ABD:                        Soft, nondistended, nontender, normoactive bowel sounds,   EXT:                         + edema, nontender, no Cyanosis or Clubbing    NEURO:                  Alert, oriented, interactive, RHP , follows commands, aphasic  PSYC:                      Calm, suspected Insight.

## 2024-11-16 NOTE — PROGRESS NOTE ADULT - SUBJECTIVE AND OBJECTIVE BOX
Patient is a 69y old  Male who presents with a chief complaint of L MCA infarct w/ hemorrhagic conversion (16 Nov 2024 11:16)      HPI:  70 y/o M w/ PMHx HLD, depression, GERD, gout, colonic polyps,  congenital absence of one kidney, recent cholecystitis s/p cholecystectomy (6/24), RHETT on CPAP orginially presented to [facility] on 11/3 with aphasia, R sided weakness and R facial droop and vigorous cough x 2 weeks s/p COVID vaccine. W 2045 on 11/2. 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 Patient transferred to [facility] [place] [place] for mechanical thrombectomy. S/p L ICA terminus occulusion a/p L ICA thrombectomy, no stent, 5 passes TICI 2C  w/ underling L petrous ICA dissection on 11/3 w/ Dr. March. Post op course c/b slight pseudoaneurysm enlargement w/ dissection distal embolisim into the terminal ICA seen on 11/4 CTA and hemorrhagic transformation of L MCA infarct.   Of note, hospital course also significant for fevers, suspect aspiration pneumonitis treated with Rocephin x 1, and dysphagia requiring easy to chew,  with mod thick liquids. EP was consulted with concerns of cardioembolic source for infarct, recommended to f/u outpatient for Zio patch.   Patient had an MBS on 11/12 which showed moderate oropharyngeal dysphagia, remarkable for delayed/reduced laryngeal vestibular closure, pharyngeal trigger delay and reduced hyolaryngeal elevation/excursion.  There was deep laryngeal penetration and possibly aspiration of thin liquid.  Patient was cleared for easy to chew GI diet with mildly thick liquids.    Patient optimized and was evaluated by PM&R and therapy for functional deficits, gait/ADL impairments and acute rehabilitation was recommended. Patient was cleared for discharge to [facility] IRU on 11/13/24.  (13 Nov 2024 15:44)      PAST MEDICAL & SURGICAL HISTORY:  GERD (gastroesophageal reflux disease)      Colon polyps      Major depression      Gout      Hypercholesteremia      RHETT on CPAP      S/P cholecystectomy      S/P cataract surgery      S/P skin cancer resection      H/O total hip arthroplasty          MEDICATIONS  (STANDING):  albuterol    90 MICROgram(s) HFA Inhaler 2 Puff(s) Inhalation every 6 hours  amLODIPine   Tablet 5 milliGRAM(s) Oral daily  aspirin 325 milliGRAM(s) Oral daily  cholecalciferol 1000 Unit(s) Oral daily  clopidogrel Tablet 75 milliGRAM(s) Oral daily  colchicine 0.6 milliGRAM(s) Oral daily  enoxaparin Injectable 40 milliGRAM(s) SubCutaneous every 24 hours  losartan 50 milliGRAM(s) Oral daily  melatonin 9 milliGRAM(s) Oral at bedtime  pantoprazole    Tablet 40 milliGRAM(s) Oral before breakfast  PARoxetine 30 milliGRAM(s) Oral daily  polyethylene glycol 3350 17 Gram(s) Oral two times a day  rosuvastatin 5 milliGRAM(s) Oral daily    MEDICATIONS  (PRN):  acetaminophen     Tablet .. 650 milliGRAM(s) Oral every 6 hours PRN Mild Pain (1 - 3)  bisacodyl Suppository 10 milliGRAM(s) Rectal daily PRN Constipation  senna 2 Tablet(s) Oral at bedtime PRN Constipation      Allergies    penicillin (Swelling)    Intolerances          VITALS  69y  Vital Signs Last 24 Hrs  T(C): 36.9 (15 Nov 2024 19:46), Max: 36.9 (15 Nov 2024 19:46)  T(F): 98.5 (15 Nov 2024 19:46), Max: 98.5 (15 Nov 2024 19:46)  HR: 61 (16 Nov 2024 05:51) (61 - 61)  BP: 117/73 (16 Nov 2024 05:51) (117/73 - 122/78)  BP(mean): --  RR: 15 (15 Nov 2024 19:46) (15 - 15)  SpO2: 96% (15 Nov 2024 19:46) (96% - 96%)    Parameters below as of 15 Nov 2024 19:46  Patient On (Oxygen Delivery Method): nasal cannula  O2 Flow (L/min): 2    Daily     Daily         RECENT LABS:                      CAPILLARY BLOOD GLUCOSE

## 2024-11-16 NOTE — PROGRESS NOTE ADULT - SUBJECTIVE AND OBJECTIVE BOX
CC: Patient is a 69y old  Male who presents with a chief complaint of L MCA infarct w/ hemorrhagic conversion (15 Nov 2024 13:07)      Interval History: Patient seen and examined at bedside. No acute overnight events. No complaints this morning.    ALLERGIES:  penicillin (Swelling)    MEDICATIONS  (STANDING):  albuterol    90 MICROgram(s) HFA Inhaler 2 Puff(s) Inhalation every 6 hours  amLODIPine   Tablet 5 milliGRAM(s) Oral daily  aspirin 325 milliGRAM(s) Oral daily  cholecalciferol 1000 Unit(s) Oral daily  clopidogrel Tablet 75 milliGRAM(s) Oral daily  colchicine 0.6 milliGRAM(s) Oral daily  enoxaparin Injectable 40 milliGRAM(s) SubCutaneous every 24 hours  losartan 50 milliGRAM(s) Oral daily  melatonin 9 milliGRAM(s) Oral at bedtime  pantoprazole    Tablet 40 milliGRAM(s) Oral before breakfast  PARoxetine 30 milliGRAM(s) Oral daily  polyethylene glycol 3350 17 Gram(s) Oral two times a day  rosuvastatin 5 milliGRAM(s) Oral daily    MEDICATIONS  (PRN):  acetaminophen     Tablet .. 650 milliGRAM(s) Oral every 6 hours PRN Mild Pain (1 - 3)  bisacodyl Suppository 10 milliGRAM(s) Rectal daily PRN Constipation  senna 2 Tablet(s) Oral at bedtime PRN Constipation    Vital Signs Last 24 Hrs  T(F): 98.5 (15 Nov 2024 19:46), Max: 98.5 (15 Nov 2024 19:46)  HR: 61 (16 Nov 2024 05:51) (61 - 61)  BP: 117/73 (16 Nov 2024 05:51) (117/73 - 122/78)  RR: 15 (15 Nov 2024 19:46) (15 - 15)  SpO2: 96% (15 Nov 2024 19:46) (96% - 96%)  I&O's Summary    BMI (kg/m2): 28.9 (11-13-24 @ 21:34)    PHYSICAL EXAM:  GENERAL: pt laying in bed in NAD  CHEST/LUNG: Clear to percussion bilaterally; No rales, rhonchi, wheezing, or rubs; normal respiratory effort   HEART: Regular rate and rhythm; No murmurs noted  ABDOMEN: Soft, Nontender, Nondistended; Bowel sounds present   MUSCULOSKELETAL/EXTREMITIES:  no cyanosis, no edema noted to BLE  NERVOUS SYSTEM:   Sensation intact; follows commands  PSYCH: Appropriate affect, Alert & Oriented x 3     LABS:                        12.2   5.26  )-----------( 279      ( 14 Nov 2024 06:23 )             37.2       11-14    140  |  104  |  21  ----------------------------<  97  4.0   |  26  |  0.96    Ca    8.9      14 Nov 2024 06:23    TPro  6.8  /  Alb  2.7  /  TBili  0.5  /  DBili  x   /  AST  37  /  ALT  29  /  AlkPhos  51  11-14                                  Urinalysis Basic - ( 14 Nov 2024 06:23 )    Color: x / Appearance: x / SG: x / pH: x  Gluc: 97 mg/dL / Ketone: x  / Bili: x / Urobili: x   Blood: x / Protein: x / Nitrite: x   Leuk Esterase: x / RBC: x / WBC x   Sq Epi: x / Non Sq Epi: x / Bacteria: x        COVID-19 PCR: [facility] (11-13-24 @ 22:33)      Care Discussed with Consultants/Other Providers: Yes   CC: Patient is a 69y old  Male who presents with a chief complaint of L MCA infarct w/ hemorrhagic conversion (15 Nov 2024 13:07)      Interval History: Patient seen and examined at bedside. No acute overnight events. Nodded yes to abd pain.    ALLERGIES:  penicillin (Swelling)    MEDICATIONS  (STANDING):  albuterol    90 MICROgram(s) HFA Inhaler 2 Puff(s) Inhalation every 6 hours  amLODIPine   Tablet 5 milliGRAM(s) Oral daily  aspirin 325 milliGRAM(s) Oral daily  cholecalciferol 1000 Unit(s) Oral daily  clopidogrel Tablet 75 milliGRAM(s) Oral daily  colchicine 0.6 milliGRAM(s) Oral daily  enoxaparin Injectable 40 milliGRAM(s) SubCutaneous every 24 hours  losartan 50 milliGRAM(s) Oral daily  melatonin 9 milliGRAM(s) Oral at bedtime  pantoprazole    Tablet 40 milliGRAM(s) Oral before breakfast  PARoxetine 30 milliGRAM(s) Oral daily  polyethylene glycol 3350 17 Gram(s) Oral two times a day  rosuvastatin 5 milliGRAM(s) Oral daily    MEDICATIONS  (PRN):  acetaminophen     Tablet .. 650 milliGRAM(s) Oral every 6 hours PRN Mild Pain (1 - 3)  bisacodyl Suppository 10 milliGRAM(s) Rectal daily PRN Constipation  senna 2 Tablet(s) Oral at bedtime PRN Constipation    Vital Signs Last 24 Hrs  T(F): 98.5 (15 Nov 2024 19:46), Max: 98.5 (15 Nov 2024 19:46)  HR: 61 (16 Nov 2024 05:51) (61 - 61)  BP: 117/73 (16 Nov 2024 05:51) (117/73 - 122/78)  RR: 15 (15 Nov 2024 19:46) (15 - 15)  SpO2: 96% (15 Nov 2024 19:46) (96% - 96%)  I&O's Summary    BMI (kg/m2): 28.9 (11-13-24 @ 21:34)    PHYSICAL EXAM:  GENERAL: pt laying in bed in NAD  CHEST/LUNG: Clear to percussion bilaterally; No rales, rhonchi, wheezing, or rubs; normal respiratory effort   HEART: Regular rate and rhythm; No murmurs noted  ABDOMEN: Soft, mild TTP R side abd, Nondistended; Bowel sounds present   MUSCULOSKELETAL/EXTREMITIES:  no cyanosis, no edema noted to BLE  NERVOUS SYSTEM:   Sensation intact; follows commands, aphasic  PSYCH: Appropriate affect, Alert & Oriented     LABS:                        12.2   5.26  )-----------( 279      ( 14 Nov 2024 06:23 )             37.2       11-14    140  |  104  |  21  ----------------------------<  97  4.0   |  26  |  0.96    Ca    8.9      14 Nov 2024 06:23    TPro  6.8  /  Alb  2.7  /  TBili  0.5  /  DBili  x   /  AST  37  /  ALT  29  /  AlkPhos  51  11-14                                  Urinalysis Basic - ( 14 Nov 2024 06:23 )    Color: x / Appearance: x / SG: x / pH: x  Gluc: 97 mg/dL / Ketone: x  / Bili: x / Urobili: x   Blood: x / Protein: x / Nitrite: x   Leuk Esterase: x / RBC: x / WBC x   Sq Epi: x / Non Sq Epi: x / Bacteria: x        COVID-19 PCR: [employer] (11-13-24 @ 22:33)      Care Discussed with Consultants/Other Providers: Yes

## 2024-11-16 NOTE — PROGRESS NOTE ADULT - ASSESSMENT
TARUN CRESPO is a 68 y/o M w/ PMHx HLD, depression, GERD, gout, colonic polyps,  congenital absence of one kidney, recent cholecystitis s/p cholecystectomy (6/24), RHETT on CPAP orginially presented to [facility] on 11/3 with aphasia, R sided weakness and R facial droop and vigorous cough x 2 weeks s/p COVID vaccine. 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  S/p L ICA terminus occulusion a/p L ICA thrombectomy, no stent, 5 passes TICI 2C  w/ underling L petrous ICA dissection on 11/3 w/ Dr. March. Now admitted to [facility] for functional deficits for initiation of a multidisciplinary rehab program     #L MCA stroke w/ hemorrhagic conversion  #L petrous ICA dissection w/ pesudoaneurysm  -CTH 11/3: hyperdense appearance of L basal ganglia and L frontal lobe gyri, mild LCA edema 2/2 recent ischemia  -CTA H/N 11/3: no evidence of significant stenosis affecting the extra cranial carotid or vertebral arteries. No evidence of aneurysm or significant vascular stenosis at [place]  -Repeat CTH 11/4: post recent endovascular revascularization of the left internal carotid artery and left middle cerebral artery. Minimally less conspicuous hyperdense appearance of left basal ganglia and left frontal lobe gyri which may represent expected evolution of contrast staining  -MRI brain 11/5: evolving recent infarct in L ICA associated with hemorrhagic transformation of the L basal ganglia. Effacement of cerebral sulci with mass effect causing 0.6 mm rightward shift.   -CTH 11/9: evolving acute to subacute left MCA infarct with evolving left basal ganglia hemorrhagic transformation, new punctuate foci of increased hyperdensity. Stable mass effect and 5mm rightward midline shift  -CTA 11/9: stable left internal carotid artery pseudoaneurysm immediately inferior to the petrous portion of the L ICA at the area of focal dissection.  -S/p L ICA terminus occulusion a/p L ICA thrombectomy, no stent, 5 passes TICI 2C  w/ underling L petrous ICA dissection on 11/3 w/ Dr. March.   -A1c 5.0; LDL 60  -No statin indicated given low LDL  -Aspirin 325mg daily  -Plavix 75mg daily   rosuvastatin 5mg QD  -Will be on DAPT for 3 months and follow up with outpt neurology  -F/u outpatient caridology for Zio-patch to r/o cardioembolic source --ILR to be considered as outpatient    # Normocytic anemia (stable)   -Monitor H/H (12.2/37.2- on 11/14)  -F/u outpatient colonoscopy for hx of colon polps  -Transfuse <7 PRN    -Iron studies: Transferrin: 11, TIBC: 170, Iron: 18, Ferritin: 414    # HTN   cw losartan and norvsac   monitor NP    #Fevers  #Pneumonitis   -Fevers on 11/5 ; infectious work up negative  -Likely 2/2 chemical pneumonitis from aspiration of ice chips  -Blood cx negative  -MRSA/MSSA- negative  -S/p empiric Rocephin x1  -Monitor CBC, fevers    #RHETT  #URI   #Cough   -CXR- negative   -CPAP HS --unknown settings will need to confirm with wife  -Albuterol PRN  -On Flovent at home (not available here)  - supplemental O2 PRN    #Gout  -Colchicine 0.6mg daily  -Uric acid 11/12- 6.8     # R Soleal Vein DVT  - Duplex U/S showed DVT below the knee, R soleal vein thrombosis 11/14  - Aspirin 325 mg daily  - Plavix 75 mg daily  - Lovenox 40 mg SubQ daily 11/15  --f/u weekly dopplers    #Depression  -Paxil 30mg daily     GI ppx: Pantoprazole 40mg daily   Discussed treatment plan with IDT.  TARUN CRESPO is a 70 y/o M w/ PMHx HLD, depression, GERD, gout, colonic polyps,  congenital absence of one kidney, recent cholecystitis s/p cholecystectomy (6/24), RHETT on CPAP orginially presented to [facility] on 11/3 with aphasia, R sided weakness and R facial droop and vigorous cough x 2 weeks s/p COVID vaccine. 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  S/p L ICA terminus occulusion a/p L ICA thrombectomy, no stent, 5 passes TICI 2C  w/ underling L petrous ICA dissection on 11/3 w/ Dr. March. Now admitted to [facility] for functional deficits for initiation of a multidisciplinary rehab program     #L MCA stroke w/ hemorrhagic conversion  #L petrous ICA dissection w/ pesudoaneurysm  -CTH 11/3: hyperdense appearance of L basal ganglia and L frontal lobe gyri, mild LCA edema 2/2 recent ischemia  -CTA H/N 11/3: no evidence of significant stenosis affecting the extra cranial carotid or vertebral arteries. No evidence of aneurysm or significant vascular stenosis at [place]  -Repeat CTH 11/4: post recent endovascular revascularization of the left internal carotid artery and left middle cerebral artery. Minimally less conspicuous hyperdense appearance of left basal ganglia and left frontal lobe gyri which may represent expected evolution of contrast staining  -MRI brain 11/5: evolving recent infarct in L ICA associated with hemorrhagic transformation of the L basal ganglia. Effacement of cerebral sulci with mass effect causing 0.6 mm rightward shift.   -CTH 11/9: evolving acute to subacute left MCA infarct with evolving left basal ganglia hemorrhagic transformation, new punctuate foci of increased hyperdensity. Stable mass effect and 5mm rightward midline shift  -CTA 11/9: stable left internal carotid artery pseudoaneurysm immediately inferior to the petrous portion of the L ICA at the area of focal dissection.  -S/p L ICA terminus occulusion a/p L ICA thrombectomy, no stent, 5 passes TICI 2C  w/ underling L petrous ICA dissection on 11/3 w/ Dr. March.   -A1c 5.0; LDL 60  -No statin indicated given low LDL  -Aspirin 325mg daily  -Plavix 75mg daily   rosuvastatin 5mg QD  -Will be on DAPT for 3 months and follow up with outpt neurology  -F/u outpatient caridology for Zio-patch to r/o cardioembolic source --ILR to be considered as outpatient    #R sided abd pain  - Tylenol PRN  - reviewed LFTs, wnl; will monitor for now    # Normocytic anemia (stable)   -Monitor H/H (12.2/37.2- on 11/14)  -F/u outpatient colonoscopy for hx of colon polps  -Transfuse <7 PRN    -Iron studies: Transferrin: 11, TIBC: 170, Iron: 18, Ferritin: 414    # HTN   cw losartan and norvsac   monitor NP    #Fevers  #Pneumonitis   -Fevers on 11/5 ; infectious work up negative  -Likely 2/2 chemical pneumonitis from aspiration of ice chips  -Blood cx negative  -MRSA/MSSA- negative  -S/p empiric Rocephin x1  -Monitor CBC, fevers    #RHETT  #URI   #Cough   -CXR- negative   -CPAP HS --unknown settings will need to confirm with wife  -Albuterol PRN  -On Flovent at home (not available here)  - supplemental O2 PRN    #Gout  -Colchicine 0.6mg daily  -Uric acid 11/12- 6.8     # R Soleal Vein DVT  - Duplex U/S showed DVT below the knee, R soleal vein thrombosis 11/14  - Aspirin 325 mg daily  - Plavix 75 mg daily  - Lovenox 40 mg SubQ daily 11/15  --f/u weekly dopplers    #Depression  -Paxil 30mg daily     GI ppx: Pantoprazole 40mg daily   Discussed treatment plan with IDT.

## 2024-11-17 PROCEDURE — 99232 SBSQ HOSP IP/OBS MODERATE 35: CPT

## 2024-11-17 PROCEDURE — 74018 RADEX ABDOMEN 1 VIEW: CPT | Mod: 26

## 2024-11-17 PROCEDURE — 71045 X-RAY EXAM CHEST 1 VIEW: CPT | Mod: 26

## 2024-11-17 RX ADMIN — Medication 1 DROP(S): at 14:45

## 2024-11-17 RX ADMIN — PANTOPRAZOLE SODIUM 40 MILLIGRAM(S): 40 TABLET, DELAYED RELEASE ORAL at 05:04

## 2024-11-17 RX ADMIN — Medication 2 PUFF(S): at 08:40

## 2024-11-17 RX ADMIN — AMLODIPINE BESYLATE 5 MILLIGRAM(S): 10 TABLET ORAL at 05:04

## 2024-11-17 RX ADMIN — Medication 2 PUFF(S): at 15:45

## 2024-11-17 RX ADMIN — CLOPIDOGREL 75 MILLIGRAM(S): 75 TABLET, FILM COATED ORAL at 12:32

## 2024-11-17 RX ADMIN — ROSUVASTATIN CALCIUM 5 MILLIGRAM(S): 5 TABLET, FILM COATED ORAL at 12:33

## 2024-11-17 RX ADMIN — Medication 1000 UNIT(S): at 12:32

## 2024-11-17 RX ADMIN — ACETAMINOPHEN, DIPHENHYDRAMINE HCL, PHENYLEPHRINE HCL 9 MILLIGRAM(S): 325; 25; 5 TABLET ORAL at 22:06

## 2024-11-17 RX ADMIN — Medication 325 MILLIGRAM(S): at 12:32

## 2024-11-17 RX ADMIN — LOSARTAN POTASSIUM 50 MILLIGRAM(S): 100 TABLET, FILM COATED ORAL at 05:04

## 2024-11-17 RX ADMIN — ACETAMINOPHEN 500MG 650 MILLIGRAM(S): 500 TABLET, COATED ORAL at 08:30

## 2024-11-17 RX ADMIN — Medication 0.6 MILLIGRAM(S): at 12:32

## 2024-11-17 RX ADMIN — PAROXETINE HYDROCHLORIDE HEMIHYDRATE 30 MILLIGRAM(S): 37.5 TABLET, FILM COATED, EXTENDED RELEASE ORAL at 12:33

## 2024-11-17 RX ADMIN — ENOXAPARIN SODIUM 40 MILLIGRAM(S): 30 INJECTION SUBCUTANEOUS at 17:20

## 2024-11-17 RX ADMIN — ACETAMINOPHEN 500MG 650 MILLIGRAM(S): 500 TABLET, COATED ORAL at 07:42

## 2024-11-17 RX ADMIN — Medication 10 MILLIGRAM(S): at 08:40

## 2024-11-17 NOTE — PROGRESS NOTE ADULT - RESPIRATORY
clear to auscultation bilaterally/no respiratory distress
clear to auscultation bilaterally/no wheezes/no rales/no rhonchi/no respiratory distress

## 2024-11-17 NOTE — PROGRESS NOTE ADULT - MOTOR
bilateral calves soft no TTP  no pedal edema  right UE flaccid, no hand swelling, no pain with PROM RUE
bilateral calves soft no TTP  no pedal edema  RUE 0.5 AROM no hand edema

## 2024-11-17 NOTE — PROGRESS NOTE ADULT - COMMENTS
Patient had BM 11/13. He is non verbal but does answer some yes/no questions with head nods or shakes (staff reports not always reliable). Using gestures, he indicates that he still had difficulty sleeping, but improved somewhat with melatonin, Difficulty falling asleep but no difficulty with sustained sleep. Per staff, had some interrupted sleep, but exacerbated by non compliance CPAP (only wants NC). Patient would like to try higher dose melatonin
Patient in bed, breathing comfortably with O2 via NC (refusing CPAP) His yes/no responses appear consistent and reliable; he endorses improved sleep with increased melatonin; quality of sleep/sustained is "so-so" (gestures with hand) but overall improved. He does not feel SOB or CP or palpitations with O2 via NC/    LAst BM 11/13, had some abdominal discomfort, hospitalist appreciated, ordered suppository

## 2024-11-17 NOTE — PROGRESS NOTE ADULT - ASSESSMENT
70 y/o M w/ PMHx HLD, depression, GERD, gout, colonic polyps,  congenital absence of one kidney, recent cholecystitis s/p cholecystectomy (6/24), RHETT on CPAP orginially presented to [facility] on 11/3/24 with 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  S/p L ICA terminus occulusion a/p L ICA thrombectomy,  w/ underling L petrous ICA dissection     # L MCA stroke w/ hemorrhagic conversion  # L petrous ICA dissection w/ pesudoaneurysm  - CTH 11/9: evolving acute to subacute left MCA infarct with evolving left basal ganglia hemorrhagic transformation, new punctuate foci of increased hyperdensity. Stable mass effect and 5mm rightward midline shift  - Aspirin 325mg daily  - Plavix 75mg daily   - Rosuvastatin 5mg QD  - outpatient cardiology f/u for w/u r/o cardioembolic source  - continue comprehensive rehab program, 3 hours a day, 5 days a week.    # R Soleal Vein DVT  - Duplex U/S showed DVT below the knee, R soleal vein thrombosis 11/14  - Lovenox 40 mg SubQ daily 11/15  - f/u weekly dopplers    # Normocytic anemia (stable) vs Iron deficiency anemia   - F/u outpatient colonoscopy for hx of colon polps  - Transfuse <7 PRN    - Iron studies: Transferrin: 11, TIBC: 170, Iron: 18, Ferritin: 414  - monitor H/H. Hgb 12.2 11/14 stable  - CBC 11/18    # RHETT  - CXR- negative   - CPAP HS 5 50% O2. Poor compliance  - O2 via NC PRN  - Albuterol PRN    # Gout  - Colchicine 0.6mg daily  - Uric acid 11/12- 6.8     # Mood/Cognition  # Depression  - Paxil 30mg daily     # Sleep:   - Increase melatonin 9 qhs 11/16. Improved sleep per patient    # Pain Management:  - Tylenol PRN    # GI/Bowel:  - Senna QHS  - Miralax BIDD  - Bisacodyl suppository QD PRN  - GI ppx: Pantoprazole 40mg daily   - constipation with abdominal discomfort: will give dulcolax suppository. If persists, AXR    # Dysphagia:  - S/p MBS on 11/12--> diet: Easy to chew; mild thick liquids- DASH  - 1:1 feeds    # LABS  CBC CMP 11/18   monitor BM        ---------------  Code Status: FULL  Emergency Contact:    Outpatient Follow-up (Specialty/Name of physician):  Dr. Cheng March  Neurology  12/24/24 appt     Cardiology    ENT  [phone]

## 2024-11-17 NOTE — PROGRESS NOTE ADULT - ASSESSMENT
TARUN CRESPO is a 68 y/o M w/ PMHx HLD, depression, GERD, gout, colonic polyps,  congenital absence of one kidney, recent cholecystitis s/p cholecystectomy (6/24), RHETT on CPAP orginially presented to [facility] on 11/3 with aphasia, R sided weakness and R facial droop and vigorous cough x 2 weeks s/p COVID vaccine. 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  S/p L ICA terminus occulusion a/p L ICA thrombectomy, no stent, 5 passes TICI 2C  w/ underling L petrous ICA dissection on 11/3 w/ Dr. March. Now admitted to [facility] for functional deficits for initiation of a multidisciplinary rehab program     #L MCA stroke w/ hemorrhagic conversion  #L petrous ICA dissection w/ pesudoaneurysm  -CTH 11/3: hyperdense appearance of L basal ganglia and L frontal lobe gyri, mild LCA edema 2/2 recent ischemia  -CTA H/N 11/3: no evidence of significant stenosis affecting the extra cranial carotid or vertebral arteries. No evidence of aneurysm or significant vascular stenosis at [place]  -Repeat CTH 11/4: post recent endovascular revascularization of the left internal carotid artery and left middle cerebral artery. Minimally less conspicuous hyperdense appearance of left basal ganglia and left frontal lobe gyri which may represent expected evolution of contrast staining  -MRI brain 11/5: evolving recent infarct in L ICA associated with hemorrhagic transformation of the L basal ganglia. Effacement of cerebral sulci with mass effect causing 0.6 mm rightward shift.   -CTH 11/9: evolving acute to subacute left MCA infarct with evolving left basal ganglia hemorrhagic transformation, new punctuate foci of increased hyperdensity. Stable mass effect and 5mm rightward midline shift  -CTA 11/9: stable left internal carotid artery pseudoaneurysm immediately inferior to the petrous portion of the L ICA at the area of focal dissection.  -S/p L ICA terminus occulusion a/p L ICA thrombectomy, no stent, 5 passes TICI 2C  w/ underling L petrous ICA dissection on 11/3 w/ Dr. March.   -A1c 5.0; LDL 60  -No statin indicated given low LDL  -Aspirin 325mg daily  -Plavix 75mg daily   rosuvastatin 5mg QD  -Will be on DAPT for 3 months and follow up with outpt neurology  -F/u outpatient caridology for Zio-patch to r/o cardioembolic source --ILR to be considered as outpatient    #R sided abd pain   - pt unreliable with questions but noted constipation. Gave suppository this AM and straight cath pt for possibly urinary retention >350cc this AM  - Tylenol PRN  - reviewed LFTs, wnl  - if pain persists and pt w/o relief s/p suppository, REC AXR later today    # Normocytic anemia (stable)   -Monitor H/H (12.2/37.2- on 11/14)  -F/u outpatient colonoscopy for hx of colon polps  -Transfuse <7 PRN    -Iron studies: Transferrin: 11, TIBC: 170, Iron: 18, Ferritin: 414    # HTN   cw losartan and norvsac   monitor NP    #Fevers  #Pneumonitis   -Fevers on 11/5 ; infectious work up negative  -Likely 2/2 chemical pneumonitis from aspiration of ice chips  -Blood cx negative  -MRSA/MSSA- negative  -S/p empiric Rocephin x1  -Monitor CBC, fevers    #RHETT  #URI   #Cough   -CXR- negative; repeat ordered per pulm this AM, will f/u result  -CPAP HS --unknown settings will need to confirm with wife  -Albuterol PRN  -On Flovent at home (not available here)  - supplemental O2 PRN, taper to 1 L and maintain sat > 90%    #Gout  -Colchicine 0.6mg daily  -Uric acid 11/12- 6.8     # R Soleal Vein DVT  - Duplex U/S showed DVT below the knee, R soleal vein thrombosis 11/14  - Aspirin 325 mg daily  - Plavix 75 mg daily  - Lovenox 40 mg SubQ daily 11/15  --f/u weekly dopplers    #Depression  -Paxil 30mg daily     GI ppx: Pantoprazole 40mg daily   Discussed treatment plan with IDT.

## 2024-11-17 NOTE — PROGRESS NOTE ADULT - SUBJECTIVE AND OBJECTIVE BOX
CC: Patient is a 69y old  Male who presents with a chief complaint of L MCA infarct w/ hemorrhagic conversion (16 Nov 2024 15:01)      Interval History: Patient seen and examined at bedside. No acute overnight events. Pt c/o R sided abd pain this AM. Nurse gave Tylenol. Discussed straight cath and giving suppository as pt nodded yes to constipation    ALLERGIES:  penicillin (Swelling)    MEDICATIONS  (STANDING):  albuterol    90 MICROgram(s) HFA Inhaler 2 Puff(s) Inhalation every 6 hours  amLODIPine   Tablet 5 milliGRAM(s) Oral daily  aspirin 325 milliGRAM(s) Oral daily  cholecalciferol 1000 Unit(s) Oral daily  clopidogrel Tablet 75 milliGRAM(s) Oral daily  colchicine 0.6 milliGRAM(s) Oral daily  enoxaparin Injectable 40 milliGRAM(s) SubCutaneous every 24 hours  losartan 50 milliGRAM(s) Oral daily  melatonin 9 milliGRAM(s) Oral at bedtime  pantoprazole    Tablet 40 milliGRAM(s) Oral before breakfast  PARoxetine 30 milliGRAM(s) Oral daily  polyethylene glycol 3350 17 Gram(s) Oral two times a day  rosuvastatin 5 milliGRAM(s) Oral daily    MEDICATIONS  (PRN):  acetaminophen     Tablet .. 650 milliGRAM(s) Oral every 6 hours PRN Mild Pain (1 - 3)  atropine 1% Ophthalmic Solution for SubLingual Use 1 Drop(s) SubLingual every 8 hours PRN cough, and increased upper airway secretions  bisacodyl Suppository 10 milliGRAM(s) Rectal daily PRN Constipation  senna 2 Tablet(s) Oral at bedtime PRN Constipation    Vital Signs Last 24 Hrs  T(F): 97.4 (17 Nov 2024 07:45), Max: 98.1 (16 Nov 2024 17:00)  HR: 61 (17 Nov 2024 07:45) (60 - 64)  BP: 119/71 (17 Nov 2024 07:45) (114/69 - 123/72)  RR: 15 (17 Nov 2024 07:45) (15 - 16)  SpO2: 96% (17 Nov 2024 07:45) (89% - 96%)  I&O's Summary    16 Nov 2024 07:01  -  17 Nov 2024 07:00  --------------------------------------------------------  IN: 0 mL / OUT: 650 mL / NET: -650 mL      BMI (kg/m2): 28.9 (11-13-24 @ 21:34)    PHYSICAL EXAM:  GENERAL: pt laying in bed in moderate distress 2/2 pain  CHEST/LUNG: Clear to percussion bilaterally; No rales, rhonchi, wheezing, or rubs; normal respiratory effort   HEART: Regular rate and rhythm; No murmurs noted  ABDOMEN: Soft, moderate TTP R side abd, Nondistended   MUSCULOSKELETAL/EXTREMITIES:  no cyanosis, no edema noted to BLE  NERVOUS SYSTEM:   Sensation intact; follows commands, aphasic  PSYCH: Appropriate affect, Alert & Oriented     LABS:                                              COVID-19 PCR: NotDetec (11-13-24 @ 22:33)      Care Discussed with Consultants/Other Providers: Yes

## 2024-11-17 NOTE — PROGRESS NOTE ADULT - SUBJECTIVE AND OBJECTIVE BOX
Patient is a 69y old  Male who presents with a chief complaint of L MCA infarct w/ hemorrhagic conversion (17 Nov 2024 09:47)      HPI:  68 y/o M w/ PMHx HLD, depression, GERD, gout, colonic polyps,  congenital absence of one kidney, recent cholecystitis s/p cholecystectomy (6/24), RHETT on CPAP orginially presented to [facility] on 11/3 with aphasia, R sided weakness and R facial droop and vigorous cough x 2 weeks s/p COVID vaccine. W 2045 on 11/2. 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 Patient transferred to [facility] [place] mechanical thrombectomy. S/p L ICA terminus occulusion a/p L ICA thrombectomy, no stent, 5 passes TICI 2C  w/ underling L petrous ICA dissection on 11/3 w/ Dr. March. Post op course c/b slight pseudoaneurysm enlargement w/ dissection distal embolisim into the terminal ICA seen on 11/4 CTA and hemorrhagic transformation of L MCA infarct.   Of note, hospital course also significant for fevers, suspect aspiration pneumonitis treated with Rocephin x 1, and dysphagia requiring easy to chew,  with mod thick liquids. EP was consulted with concerns of cardioembolic source for infarct, recommended to f/u outpatient for Zio patch.   Patient had an MBS on 11/12 which showed moderate oropharyngeal dysphagia, remarkable for delayed/reduced laryngeal vestibular closure, pharyngeal trigger delay and reduced hyolaryngeal elevation/excursion.  There was deep laryngeal penetration and possibly aspiration of thin liquid.  Patient was cleared for easy to chew GI diet with mildly thick liquids.    Patient optimized and was evaluated by PM&R and therapy for functional deficits, gait/ADL impairments and acute rehabilitation was recommended. Patient was cleared for discharge to [facility] on 11/13/24.  (13 Nov 2024 15:44)      PAST MEDICAL & SURGICAL HISTORY:  GERD (gastroesophageal reflux disease)      Colon polyps      Major depression      Gout      Hypercholesteremia      RHETT on CPAP      S/P cholecystectomy      S/P cataract surgery      S/P skin cancer resection      H/O total hip arthroplasty          MEDICATIONS  (STANDING):  albuterol    90 MICROgram(s) HFA Inhaler 2 Puff(s) Inhalation every 6 hours  amLODIPine   Tablet 5 milliGRAM(s) Oral daily  aspirin 325 milliGRAM(s) Oral daily  cholecalciferol 1000 Unit(s) Oral daily  clopidogrel Tablet 75 milliGRAM(s) Oral daily  colchicine 0.6 milliGRAM(s) Oral daily  enoxaparin Injectable 40 milliGRAM(s) SubCutaneous every 24 hours  losartan 50 milliGRAM(s) Oral daily  melatonin 9 milliGRAM(s) Oral at bedtime  pantoprazole    Tablet 40 milliGRAM(s) Oral before breakfast  PARoxetine 30 milliGRAM(s) Oral daily  polyethylene glycol 3350 17 Gram(s) Oral two times a day  rosuvastatin 5 milliGRAM(s) Oral daily    MEDICATIONS  (PRN):  acetaminophen     Tablet .. 650 milliGRAM(s) Oral every 6 hours PRN Mild Pain (1 - 3)  atropine 1% Ophthalmic Solution for SubLingual Use 1 Drop(s) SubLingual every 8 hours PRN cough, and increased upper airway secretions  bisacodyl Suppository 10 milliGRAM(s) Rectal daily PRN Constipation  senna 2 Tablet(s) Oral at bedtime PRN Constipation      Allergies    penicillin (Swelling)    Intolerances          VITALS  69y  Vital Signs Last 24 Hrs  T(C): 36.3 (17 Nov 2024 07:45), Max: 36.7 (16 Nov 2024 17:00)  T(F): 97.4 (17 Nov 2024 07:45), Max: 98.1 (16 Nov 2024 17:00)  HR: 61 (17 Nov 2024 07:45) (60 - 64)  BP: 119/71 (17 Nov 2024 07:45) (114/69 - 123/72)  BP(mean): --  RR: 15 (17 Nov 2024 07:45) (15 - 15)  SpO2: 96% (17 Nov 2024 07:45) (95% - 96%)    Parameters below as of 17 Nov 2024 07:45  Patient On (Oxygen Delivery Method): nasal cannula w/ humidification  O2 Flow (L/min): 1    Daily     Daily         RECENT LABS:                      CAPILLARY BLOOD GLUCOSE

## 2024-11-18 LAB
ALBUMIN SERPL ELPH-MCNC: 2.7 G/DL — LOW (ref 3.3–5)
ALP SERPL-CCNC: 56 U/L — SIGNIFICANT CHANGE UP (ref 40–120)
ALT FLD-CCNC: 35 U/L — SIGNIFICANT CHANGE UP (ref 10–45)
ANION GAP SERPL CALC-SCNC: 5 MMOL/L — SIGNIFICANT CHANGE UP (ref 5–17)
APPEARANCE UR: CLEAR — SIGNIFICANT CHANGE UP
AST SERPL-CCNC: 32 U/L — SIGNIFICANT CHANGE UP (ref 10–40)
BACTERIA # UR AUTO: ABNORMAL /HPF
BASOPHILS # BLD AUTO: 0.04 K/UL — SIGNIFICANT CHANGE UP (ref 0–0.2)
BASOPHILS NFR BLD AUTO: 0.6 % — SIGNIFICANT CHANGE UP (ref 0–2)
BILIRUB SERPL-MCNC: 0.5 MG/DL — SIGNIFICANT CHANGE UP (ref 0.2–1.2)
BILIRUB UR-MCNC: NEGATIVE — SIGNIFICANT CHANGE UP
BUN SERPL-MCNC: 21 MG/DL — SIGNIFICANT CHANGE UP (ref 7–23)
CALCIUM SERPL-MCNC: 8.7 MG/DL — SIGNIFICANT CHANGE UP (ref 8.4–10.5)
CHLORIDE SERPL-SCNC: 108 MMOL/L — SIGNIFICANT CHANGE UP (ref 96–108)
CO2 SERPL-SCNC: 30 MMOL/L — SIGNIFICANT CHANGE UP (ref 22–31)
COLOR SPEC: YELLOW — SIGNIFICANT CHANGE UP
CREAT SERPL-MCNC: 0.94 MG/DL — SIGNIFICANT CHANGE UP (ref 0.5–1.3)
DIFF PNL FLD: ABNORMAL
EGFR: 87 ML/MIN/1.73M2 — SIGNIFICANT CHANGE UP
EOSINOPHIL # BLD AUTO: 0.18 K/UL — SIGNIFICANT CHANGE UP (ref 0–0.5)
EOSINOPHIL NFR BLD AUTO: 2.6 % — SIGNIFICANT CHANGE UP (ref 0–6)
EPI CELLS # UR: PRESENT
GLUCOSE SERPL-MCNC: 101 MG/DL — HIGH (ref 70–99)
GLUCOSE UR QL: NEGATIVE MG/DL — SIGNIFICANT CHANGE UP
HCT VFR BLD CALC: 36.2 % — LOW (ref 39–50)
HGB BLD-MCNC: 12 G/DL — LOW (ref 13–17)
HYALINE CASTS # UR AUTO: SIGNIFICANT CHANGE UP
IMM GRANULOCYTES NFR BLD AUTO: 0.3 % — SIGNIFICANT CHANGE UP (ref 0–0.9)
KETONES UR-MCNC: NEGATIVE MG/DL — SIGNIFICANT CHANGE UP
LEUKOCYTE ESTERASE UR-ACNC: NEGATIVE — SIGNIFICANT CHANGE UP
LYMPHOCYTES # BLD AUTO: 1.92 K/UL — SIGNIFICANT CHANGE UP (ref 1–3.3)
LYMPHOCYTES # BLD AUTO: 27.4 % — SIGNIFICANT CHANGE UP (ref 13–44)
MCHC RBC-ENTMCNC: 30.6 PG — SIGNIFICANT CHANGE UP (ref 27–34)
MCHC RBC-ENTMCNC: 33.1 G/DL — SIGNIFICANT CHANGE UP (ref 32–36)
MCV RBC AUTO: 92.3 FL — SIGNIFICANT CHANGE UP (ref 80–100)
MONOCYTES # BLD AUTO: 0.51 K/UL — SIGNIFICANT CHANGE UP (ref 0–0.9)
MONOCYTES NFR BLD AUTO: 7.3 % — SIGNIFICANT CHANGE UP (ref 2–14)
NEUTROPHILS # BLD AUTO: 4.33 K/UL — SIGNIFICANT CHANGE UP (ref 1.8–7.4)
NEUTROPHILS NFR BLD AUTO: 61.8 % — SIGNIFICANT CHANGE UP (ref 43–77)
NITRITE UR-MCNC: NEGATIVE — SIGNIFICANT CHANGE UP
NRBC # BLD: 0 /100 WBCS — SIGNIFICANT CHANGE UP (ref 0–0)
PH UR: 5.5 — SIGNIFICANT CHANGE UP (ref 5–8)
PLATELET # BLD AUTO: 309 K/UL — SIGNIFICANT CHANGE UP (ref 150–400)
POTASSIUM SERPL-MCNC: 3.8 MMOL/L — SIGNIFICANT CHANGE UP (ref 3.5–5.3)
POTASSIUM SERPL-SCNC: 3.8 MMOL/L — SIGNIFICANT CHANGE UP (ref 3.5–5.3)
PROT SERPL-MCNC: 6.3 G/DL — SIGNIFICANT CHANGE UP (ref 6–8.3)
PROT UR-MCNC: NEGATIVE MG/DL — SIGNIFICANT CHANGE UP
RBC # BLD: 3.92 M/UL — LOW (ref 4.2–5.8)
RBC # FLD: 12.1 % — SIGNIFICANT CHANGE UP (ref 10.3–14.5)
RBC CASTS # UR COMP ASSIST: 20 /HPF — HIGH (ref 0–4)
SODIUM SERPL-SCNC: 143 MMOL/L — SIGNIFICANT CHANGE UP (ref 135–145)
SP GR SPEC: 1.03 — SIGNIFICANT CHANGE UP (ref 1–1.03)
UROBILINOGEN FLD QL: 1 MG/DL — SIGNIFICANT CHANGE UP (ref 0.2–1)
WBC # BLD: 7 K/UL — SIGNIFICANT CHANGE UP (ref 3.8–10.5)
WBC # FLD AUTO: 7 K/UL — SIGNIFICANT CHANGE UP (ref 3.8–10.5)
WBC UR QL: 6 /HPF — HIGH (ref 0–5)

## 2024-11-18 PROCEDURE — 99232 SBSQ HOSP IP/OBS MODERATE 35: CPT

## 2024-11-18 PROCEDURE — 74018 RADEX ABDOMEN 1 VIEW: CPT | Mod: 26

## 2024-11-18 RX ORDER — CIPROFLOXACIN HCL 750 MG
500 TABLET ORAL EVERY 12 HOURS
Refills: 0 | Status: DISCONTINUED | OUTPATIENT
Start: 2024-11-18 | End: 2024-11-20

## 2024-11-18 RX ORDER — TAMSULOSIN HYDROCHLORIDE 0.4 MG/1
0.4 CAPSULE ORAL AT BEDTIME
Refills: 0 | Status: DISCONTINUED | OUTPATIENT
Start: 2024-11-18 | End: 2024-12-05

## 2024-11-18 RX ADMIN — ROSUVASTATIN CALCIUM 5 MILLIGRAM(S): 5 TABLET, FILM COATED ORAL at 13:05

## 2024-11-18 RX ADMIN — AMLODIPINE BESYLATE 5 MILLIGRAM(S): 10 TABLET ORAL at 05:26

## 2024-11-18 RX ADMIN — LOSARTAN POTASSIUM 50 MILLIGRAM(S): 100 TABLET, FILM COATED ORAL at 05:26

## 2024-11-18 RX ADMIN — Medication 5 MILLIGRAM(S): at 13:05

## 2024-11-18 RX ADMIN — POLYETHYLENE GLYCOL 3350 17 GRAM(S): 17 POWDER, FOR SOLUTION ORAL at 18:07

## 2024-11-18 RX ADMIN — PAROXETINE HYDROCHLORIDE HEMIHYDRATE 30 MILLIGRAM(S): 37.5 TABLET, FILM COATED, EXTENDED RELEASE ORAL at 13:06

## 2024-11-18 RX ADMIN — Medication 0.6 MILLIGRAM(S): at 13:06

## 2024-11-18 RX ADMIN — ENOXAPARIN SODIUM 40 MILLIGRAM(S): 30 INJECTION SUBCUTANEOUS at 18:03

## 2024-11-18 RX ADMIN — Medication 325 MILLIGRAM(S): at 13:06

## 2024-11-18 RX ADMIN — Medication 2 PUFF(S): at 20:23

## 2024-11-18 RX ADMIN — Medication 1000 UNIT(S): at 13:06

## 2024-11-18 RX ADMIN — Medication 500 MILLIGRAM(S): at 18:00

## 2024-11-18 RX ADMIN — TAMSULOSIN HYDROCHLORIDE 0.4 MILLIGRAM(S): 0.4 CAPSULE ORAL at 21:25

## 2024-11-18 RX ADMIN — Medication 2 PUFF(S): at 15:19

## 2024-11-18 RX ADMIN — Medication 2 PUFF(S): at 08:20

## 2024-11-18 RX ADMIN — CLOPIDOGREL 75 MILLIGRAM(S): 75 TABLET, FILM COATED ORAL at 13:06

## 2024-11-18 RX ADMIN — PANTOPRAZOLE SODIUM 40 MILLIGRAM(S): 40 TABLET, DELAYED RELEASE ORAL at 05:26

## 2024-11-18 RX ADMIN — Medication 500 MILLIGRAM(S): at 13:04

## 2024-11-18 RX ADMIN — ACETAMINOPHEN, DIPHENHYDRAMINE HCL, PHENYLEPHRINE HCL 9 MILLIGRAM(S): 325; 25; 5 TABLET ORAL at 21:25

## 2024-11-18 NOTE — PROGRESS NOTE ADULT - SUBJECTIVE AND OBJECTIVE BOX
HPI:  70 y/o M w/ PMHx HLD, depression, GERD, gout, colonic polyps,  congenital absence of one kidney, recent cholecystitis s/p cholecystectomy (6/24), RHETT on CPAP orginially presented to [facility] on 11/3 with aphasia, R sided weakness and R facial droop and vigorous cough x 2 weeks s/p COVID vaccine. LKW 2045 on 11/2. 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 Patient transferred to [facility] for mechanical thrombectomy. S/p L ICA terminus occulusion a/p L ICA thrombectomy, no stent, 5 passes TICI 2C  w/ underling L petrous ICA dissection on 11/3 w/ Dr. March. Post op course c/b slight pseudoaneurysm enlargement w/ dissection distal embolisim into the terminal ICA seen on 11/4 CTA and hemorrhagic transformation of L MCA infarct.   Of note, hospital course also significant for fevers, suspect aspiration pneumonitis treated with Rocephin x 1, and dysphagia requiring easy to chew,  with mod thick liquids. EP was consulted with concerns of cardioembolic source for infarct, recommended to f/u outpatient for Zio patch.   Patient had an MBS on 11/12 which showed moderate oropharyngeal dysphagia, remarkable for delayed/reduced laryngeal vestibular closure, pharyngeal trigger delay and reduced hyolaryngeal elevation/excursion.  There was deep laryngeal penetration and possibly aspiration of thin liquid.  Patient was cleared for easy to chew GI diet with mildly thick liquids.    Patient optimized and was evaluated by PM&R and therapy for functional deficits, gait/ADL impairments and acute rehabilitation was recommended. Patient was cleared for discharge to [facility] IRU on 11/13/24.  (13 Nov 2024 15:44)          Subjective:  Mr Jamil is seen seated bedside in his wheelchair.  He doesn't verbally respond to questions, but does respond with head nods and shaking his head.  He agrees that he had abdominal pain over the weekend, but that it has since resolved after having some bowel movements.  He denies dysuria.  He initially indicated that he had left thigh pain, but then later when seen again in therapy denied having any further pain in his left thigh.  He nods yes that he feels his mood is stable.  Per his therapist today, Mr Jamil's O2 was mildly low (though Mr Jamil denied shortness of breath) during standing exercises in therapy.  Mr Jamil continues to require straight catheterization, is unable to independently void at this time (this morning was catheterized for 600 cc and then again for 500 cc).  He has been unable to independently urinate since late last week.      VITALS  Vital Signs Last 24 Hrs  T(C): 36.3 (18 Nov 2024 08:05), Max: 36.4 (17 Nov 2024 20:25)  T(F): 97.4 (18 Nov 2024 08:05), Max: 97.6 (17 Nov 2024 20:25)  HR: 59 (18 Nov 2024 08:05) (54 - 60)  BP: 123/76 (18 Nov 2024 08:05) (113/71 - 123/76)  BP(mean): --  RR: 17 (18 Nov 2024 08:05) (17 - 18)  SpO2: 97% (18 Nov 2024 08:05) (95% - 97%)    Parameters below as of 18 Nov 2024 08:05  Patient On (Oxygen Delivery Method): nasal cannula  O2 Flow (L/min): 2      REVIEW OF SYMPTOMS  Neurological deficits      PHYSICAL EXAM:   Constitutional - NAD, Comfortable   HEENT - NCAT, EOMI   Neck - Supple, No limited ROM  Chest - good chest expansion, good respiratory effort, CTAB    Cardio - warm and well perfused, RRR, no murmur   Abdomen -  Soft, NTND   Extremities - No peripheral edema, No calf tenderness      Neurologic Exam:                       Cognitive -              Orientation: Awake, Alert, Orientation limited due to aphasia            Attention:  limited due to aphasia; can follow simple 1 step commands; 2 step commands with delayed processing            Memory: unable to test due to aphasia     Speech – + Aphasia--Nonfluent, Impaired multiple step command following, but able to follow all simple commands, impaired repetition; + dysarthria,  able to phonate "aah" but did not phonate or verbalize any words.       Cranial Nerves – PERRLA , EOMI, + left facial weakness; facial sensation impaired on right, + dysarthria, + dysphagia, Shoulder shrug impaired        Motor -                        LEFT    UE - ShAB 5/5, EF 5/5, EE 5/5, WE 5/5,  5/5                     RIGHT UE - ShAB 0/5, EF 0/5, EE 0/5, WE 0/5,  0/5                     LEFT    LE - HF 5/5, KE 5/5, DF 5/5, PF 5/5                     RIGHT LE - HF 0/5, KE 0/5, DF 0/5, PF 0/5        Coordination - FTN/HTS intact on the left; unable to assess due to weaknes son the right     Sensory – Impaired to LT  on right side;-- withdraws to pain Left LE but no response left UE     Reflexes - DTR  2/ 4 , neg Soria's b/l,  Tone: no increased tone    MSK: right shoulder PROM WNL, +sublux  Psychiatric - Mood stable, Affect WNL   Skin on admission: intact      RECENT LABS                        12.0   7.00  )-----------( 309      ( 18 Nov 2024 05:57 )             36.2     11-18    143  |  108  |  21  ----------------------------<  101[H]  3.8   |  30  |  0.94    Ca    8.7      18 Nov 2024 05:57    TPro  6.3  /  Alb  2.7[L]  /  TBili  0.5  /  DBili  x   /  AST  32  /  ALT  35  /  AlkPhos  56  11-18      Urinalysis Basic - ( 18 Nov 2024 05:57 )    Color: x / Appearance: x / SG: x / pH: x  Gluc: 101 mg/dL / Ketone: x  / Bili: x / Urobili: x   Blood: x / Protein: x / Nitrite: x   Leuk Esterase: x / RBC: x / WBC x   Sq Epi: x / Non Sq Epi: x / Bacteria: x          RADIOLOGY/OTHER RESULTS      MEDICATIONS  (STANDING):  albuterol    90 MICROgram(s) HFA Inhaler 2 Puff(s) Inhalation every 6 hours  amLODIPine   Tablet 5 milliGRAM(s) Oral daily  aspirin 325 milliGRAM(s) Oral daily  bisacodyl 10 milliGRAM(s) Oral once  cholecalciferol 1000 Unit(s) Oral daily  ciprofloxacin     Tablet 500 milliGRAM(s) Oral every 12 hours  clopidogrel Tablet 75 milliGRAM(s) Oral daily  colchicine 0.6 milliGRAM(s) Oral daily  enoxaparin Injectable 40 milliGRAM(s) SubCutaneous every 24 hours  losartan 50 milliGRAM(s) Oral daily  melatonin 9 milliGRAM(s) Oral at bedtime  pantoprazole    Tablet 40 milliGRAM(s) Oral before breakfast  PARoxetine 30 milliGRAM(s) Oral daily  polyethylene glycol 3350 17 Gram(s) Oral two times a day  rosuvastatin 5 milliGRAM(s) Oral daily  tamsulosin 0.4 milliGRAM(s) Oral at bedtime    MEDICATIONS  (PRN):  acetaminophen     Tablet .. 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  S/p L ICA terminus occulusion a/p L ICA thrombectomy,  w/ underling L petrous ICA dissection     # L MCA stroke w/ hemorrhagic conversion  # L petrous ICA dissection w/ pesudoaneurysm  - CTH 11/9: evolving acute to subacute left MCA infarct with evolving left basal ganglia hemorrhagic transformation, new punctuate foci of increased hyperdensity. Stable mass effect and 5mm rightward midline shift  - Aspirin 325mg daily  - Plavix 75mg daily   - Rosuvastatin 5mg QD  - outpatient cardiology f/u for w/u r/o cardioembolic source  - continue comprehensive rehab program, 3 hours a day, 5 days a week.    # R Soleal Vein DVT  - Duplex U/S showed DVT below the knee, R soleal vein thrombosis 11/14  - Lovenox 40 mg SubQ daily 11/15  - f/u weekly dopplers, ordered for 11/21    #Urinary Retention, unimproved  #Constipation, resolving  -continue bladder scans  -UA without gross contamination, but per hospitalist will start cipro 11/18 empirically and send urine culture.    -KUB 11/17: Nonobstructed bowel gas pattern. Enteric contrast throughout the colon with moderately large stool burden.  -LBM recorded 11/17  -KUB ordered 11/18, follow results  -flomax 0.4 initiated daily 11/18  -CTM constipation    # Normocytic anemia (stable) vs Iron deficiency anemia   - F/u outpatient colonoscopy for hx of colon polps  - Transfuse <7 PRN    - Iron studies: Transferrin: 11, TIBC: 170, Iron: 18, Ferritin: 414  - monitor H/H. Hgb 12.2 11/14 stable  - CBC 11/18    # RHETT  # Activity-Induced Hypoxia  - CXR- negative   - CPAP HS 5 50% O2. Poor compliance  - O2 via NC PRN  - O2 up to 3L NC as needed during therapy  - Albuterol PRN    # Gout  - Colchicine 0.6mg daily  - Uric acid 11/12- 6.8     # Mood/Cognition  # Depression  - Paxil 30mg daily     # Sleep:   - Increase melatonin 9 qhs 11/16. Improved sleep per patient    # Pain Management:  - Tylenol PRN    # GI/Bowel:  - Senna QHS  - Miralax BIDD  - Bisacodyl suppository QD PRN  - GI ppx: Pantoprazole 40mg daily   - constipation with abdominal discomfort: will give dulcolax suppository. If persists, AXR  - per SLP, will initiate free water protocol     # Dysphagia:  - S/p MBS on 11/12--> diet: Easy to chew; mild thick liquids- DASH  - 1:1 feeds    # LABS  CBC CMP 11/18   monitor BM        ---------------  Code Status: FULL  Emergency Contact:    Outpatient Follow-up (Specialty/Name of physician):  Dr. Cheng March  Neurology  12/24/24 appt     Cardiology    ENT  [phone]           Nutritional Assessment:  · Nutritional Assessment	1. Continue Easy to Chew, Mildly Thickened Liquid diet.   2. Recommend Ensure Plus High Protein Daily 8 oz (Provides 350 kcal, 20 grams of protein) 1x/day to assist pt in meeting estimated protein energy needs.  3. Patient requires assist with feeds; Restorative Dining Room    4. Monitor PO intake, GI tolerance, skin integrity, labs, weight, and bowel movement regularity.   5. Honor food preferences as feasible. Assist with meals PRN and encourage PO intake.  6. Follow SLP recommendations  7. Provide ongoing diet education as needed  8. RD remains available upon request and will follow-up per protocol       HPI:  70 y/o M w/ PMHx HLD, depression, GERD, gout, colonic polyps,  congenital absence of one kidney, recent cholecystitis s/p cholecystectomy (6/24), RHETT on CPAP orginially presented to [facility] on 11/3 with aphasia, R sided weakness and R facial droop and vigorous cough x 2 weeks s/p COVID vaccine. LKW 2045 on 11/2. 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 Patient transferred to [facility] for mechanical thrombectomy. S/p L ICA terminus occulusion a/p L ICA thrombectomy, no stent, 5 passes TICI 2C  w/ underling L petrous ICA dissection on 11/3 w/ Dr. March. Post op course c/b slight pseudoaneurysm enlargement w/ dissection distal embolisim into the terminal ICA seen on 11/4 CTA and hemorrhagic transformation of L MCA infarct.   Of note, hospital course also significant for fevers, suspect aspiration pneumonitis treated with Rocephin x 1, and dysphagia requiring easy to chew,  with mod thick liquids. EP was consulted with concerns of cardioembolic source for infarct, recommended to f/u outpatient for Zio patch.   Patient had an MBS on 11/12 which showed moderate oropharyngeal dysphagia, remarkable for delayed/reduced laryngeal vestibular closure, pharyngeal trigger delay and reduced hyolaryngeal elevation/excursion.  There was deep laryngeal penetration and possibly aspiration of thin liquid.  Patient was cleared for easy to chew GI diet with mildly thick liquids.    Patient optimized and was evaluated by PM&R and therapy for functional deficits, gait/ADL impairments and acute rehabilitation was recommended. Patient was cleared for discharge to [facility] IRU on 11/13/24.  (13 Nov 2024 15:44)          Subjective:  Mr Jamil is seen seated bedside in his wheelchair.  He doesn't verbally respond to questions, but does respond with head nods and shaking his head.  He agrees that he had abdominal pain over the weekend, but that it has since resolved after having some bowel movements.  He denies dysuria.  He initially indicated that he had left thigh pain, but then later when seen again in therapy denied having any further pain in his left thigh.  He nods yes that he feels his mood is stable.  Per his therapist today, Mr Jamil's O2 was mildly low (though Mr Jamil denied shortness of breath) during standing exercises in therapy.  Mr Jamil continues to require straight catheterization, is unable to independently void at this time (this morning was catheterized for 600 cc and then again for 500 cc).  He was in retention on admission but did void on Friday  Pt. reports his sleep is improved with melatonin increase    VITALS  Vital Signs Last 24 Hrs  T(C): 36.3 (18 Nov 2024 08:05), Max: 36.4 (17 Nov 2024 20:25)  T(F): 97.4 (18 Nov 2024 08:05), Max: 97.6 (17 Nov 2024 20:25)  HR: 59 (18 Nov 2024 08:05) (54 - 60)  BP: 123/76 (18 Nov 2024 08:05) (113/71 - 123/76)  BP(mean): --  RR: 17 (18 Nov 2024 08:05) (17 - 18)  SpO2: 97% (18 Nov 2024 08:05) (95% - 97%)    Parameters below as of 18 Nov 2024 08:05  Patient On (Oxygen Delivery Method): nasal cannula  O2 Flow (L/min): 2      REVIEW OF SYMPTOMS  Neurological deficits      PHYSICAL EXAM:   Constitutional - NAD, Comfortable   HEENT - NCAT, EOMI   Neck - Supple, No limited ROM  Chest - good chest expansion, good respiratory effort, CTAB    Cardio - warm and well perfused, RRR, no murmur   Abdomen -  Soft, NTND   Extremities - No peripheral edema, No calf tenderness      Neurologic Exam:                       Cognitive -              Orientation: Awake, Alert, Orientation limited due to aphasia            Attention:  limited due to aphasia; can follow simple 1 step commands; 2 step commands with delayed processing            Memory: unable to test due to aphasia     Speech – + Aphasia--Nonfluent, Impaired multiple step command following, but able to follow all simple commands, impaired repetition; + dysarthria,  able to phonate "aah" but did not phonate or verbalize any words.       Cranial Nerves – PERRLA , EOMI, + left facial weakness; facial sensation impaired on right, + dysarthria, + dysphagia, Shoulder shrug impaired        Motor -                        LEFT    UE - ShAB 5/5, EF 5/5, EE 5/5, WE 5/5,  5/5                     RIGHT UE - ShAB 0/5, EF 0/5, EE 0/5, WE 0/5,  0/5                     LEFT    LE - HF 5/5, KE 5/5, DF 5/5, PF 5/5                     RIGHT LE - HF 0/5, KE 0/5, DF 0/5, PF 0/5        Coordination - FTN/HTS intact on the left; unable to assess due to weaknes son the right     Sensory – Impaired to LT  on right side;-- withdraws to pain Left LE but no response left UE     Reflexes - DTR  2/ 4 , neg Soria's b/l,  Tone: no increased tone    MSK: right shoulder PROM WNL, +sublux  Psychiatric - Mood stable, Affect WNL   Skin on admission: intact      RECENT LABS                        12.0   7.00  )-----------( 309      ( 18 Nov 2024 05:57 )             36.2     11-18    143  |  108  |  21  ----------------------------<  101[H]  3.8   |  30  |  0.94    Ca    8.7      18 Nov 2024 05:57    TPro  6.3  /  Alb  2.7[L]  /  TBili  0.5  /  DBili  x   /  AST  32  /  ALT  35  /  AlkPhos  56  11-18      Urinalysis Basic - ( 18 Nov 2024 05:57 )    Color: x / Appearance: x / SG: x / pH: x  Gluc: 101 mg/dL / Ketone: x  / Bili: x / Urobili: x   Blood: x / Protein: x / Nitrite: x   Leuk Esterase: x / RBC: x / WBC x   Sq Epi: x / Non Sq Epi: x / Bacteria: x          RADIOLOGY/OTHER RESULTS      MEDICATIONS  (STANDING):  albuterol    90 MICROgram(s) HFA Inhaler 2 Puff(s) Inhalation every 6 hours  amLODIPine   Tablet 5 milliGRAM(s) Oral daily  aspirin 325 milliGRAM(s) Oral daily  bisacodyl 10 milliGRAM(s) Oral once  cholecalciferol 1000 Unit(s) Oral daily  ciprofloxacin     Tablet 500 milliGRAM(s) Oral every 12 hours  clopidogrel Tablet 75 milliGRAM(s) Oral daily  colchicine 0.6 milliGRAM(s) Oral daily  enoxaparin Injectable 40 milliGRAM(s) SubCutaneous every 24 hours  losartan 50 milliGRAM(s) Oral daily  melatonin 9 milliGRAM(s) Oral at bedtime  pantoprazole    Tablet 40 milliGRAM(s) Oral before breakfast  PARoxetine 30 milliGRAM(s) Oral daily  polyethylene glycol 3350 17 Gram(s) Oral two times a day  rosuvastatin 5 milliGRAM(s) Oral daily  tamsulosin 0.4 milliGRAM(s) Oral at bedtime    MEDICATIONS  (PRN):  acetaminophen     Tablet .. 650 milliGRAM(s) Oral every 6 hours PRN Mild Pain (1 - 3)  atropine 1% Ophthalmic Solution for SubLingual Use 1 Drop(s) SubLingual every 8 hours PRN cough, and increased upper airway secretions  bisacodyl Suppository 10 milliGRAM(s) Rectal daily PRN Constipation  senna 2 Tablet(s) Oral at bedtime PRN Constipation              Assessment and Plan:   · Assessment	  70 y/o M w/ PMHx HLD, depression, GERD, gout, colonic polyps,  congenital absence of one kidney, recent cholecystitis s/p cholecystectomy (6/24), RHETT on CPAP orginially presented to [facility] on 11/3/24 with 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  S/p L ICA terminus occulusion a/p L ICA thrombectomy,  w/ underling L petrous ICA dissection     # L MCA stroke w/ hemorrhagic conversion  # L petrous ICA dissection w/ pesudoaneurysm  - CTH 11/9: evolving acute to subacute left MCA infarct with evolving left basal ganglia hemorrhagic transformation, new punctuate foci of increased hyperdensity. Stable mass effect and 5mm rightward midline shift  - Aspirin 325mg daily  - Plavix 75mg daily   - Rosuvastatin 5mg QD  - outpatient cardiology f/u for w/u r/o cardioembolic source  - continue comprehensive rehab program, 3 hours a day, 5 days a week.    # R Soleal Vein DVT  - Duplex U/S showed DVT below the knee, R soleal vein thrombosis 11/14  - Lovenox 40 mg SubQ daily 11/15  - f/u weekly dopplers, ordered for 11/21    #Urinary Retention, unimproved  #Constipation, resolving  -continue bladder scans  -UA without gross contamination, but per hospitalist will start cipro 11/18 empirically and send urine culture.    -KUB 11/17: Nonobstructed bowel gas pattern. Enteric contrast throughout the colon with moderately large stool burden.  -LBM recorded 11/17  -KUB ordered 11/18, follow results  -flomax 0.4 initiated daily 11/18  -CTM constipation    # Normocytic anemia (stable) vs Iron deficiency anemia   - F/u outpatient colonoscopy for hx of colon polps  - Transfuse <7 PRN    - Iron studies: Transferrin: 11, TIBC: 170, Iron: 18, Ferritin: 414  - monitor H/H. Hgb 12.2 11/14 stable  - CBC 11/18    # RHETT  # Activity-Induced Hypoxia  - CXR- negative   - CPAP HS 5 50% O2. Poor compliance  - O2 via NC PRN  - O2 up to 3L NC as needed during therapy  - Albuterol PRN    # Gout  - Colchicine 0.6mg daily  - Uric acid 11/12- 6.8     # Mood/Cognition  # Depression  - Paxil 30mg daily     # Sleep:   - Increase melatonin 9 qhs 11/16. Improved sleep per patient    # Pain Management:  - Tylenol PRN    # GI/Bowel:  - Senna QHS  - Miralax BIDD  - Bisacodyl suppository QD PRN  - GI ppx: Pantoprazole 40mg daily   - constipation with abdominal discomfort: will give dulcolax suppository. If persists, AXR  - per SLP, will initiate free water protocol     # Dysphagia:  - S/p MBS on 11/12--> diet: Easy to chew; mild thick liquids- DASH  - 1:1 feeds    # LABS  CBC CMP 11/18   monitor BM        ---------------  Code Status: FULL  Emergency Contact:    Outpatient Follow-up (Specialty/Name of physician):  Dr. Cheng March  Neurology  12/24/24 appt     Cardiology    ENT  [phone]           Nutritional Assessment:  · Nutritional Assessment	1. Continue Easy to Chew, Mildly Thickened Liquid diet.   2. Recommend Ensure Plus High Protein Daily 8 oz (Provides 350 kcal, 20 grams of protein) 1x/day to assist pt in meeting estimated protein energy needs.  3. Patient requires assist with feeds; Restorative Dining Room    4. Monitor PO intake, GI tolerance, skin integrity, labs, weight, and bowel movement regularity.   5. Honor food preferences as feasible. Assist with meals PRN and encourage PO intake.  6. Follow SLP recommendations  7. Provide ongoing diet education as needed  8. RD remains available upon request and will follow-up per protocol

## 2024-11-18 NOTE — PROGRESS NOTE ADULT - ASSESSMENT
Physical Examination:  GENERAL:               Alert, Oriented, No acute distress.    HEENT:                    Right facial droop No JVD, dry MM  PULM:                     Bilateral air entry, transmitted sounds to auscultation bilaterally, no significant sputum production, no Rales, No Rhonchi, No Wheezing  CVS:                         S1, S2,  No Murmur  ABD:                        Soft, nondistended, nontender, normoactive bowel sounds,   EXT:                         + edema, nontender, no Cyanosis or Clubbing    NEURO:                  Alert, oriented, interactive, RHP , follows commands, aphasic  PSYC:                      Calm, suspected Insight.          Assessment  1. Hypoxemia suspect from atelectasis, RHETT and increased upper airway secretions  2. s/p L MCA CVA with hemorragic conversion  3. Obesity with RHETT  4. R soleal Vein DVT     Plan  n/c o2 to maintain sat on 1 L and maintain sat > 90%  As has upper airway secretions suspect will benefit with atropine SL to dry mouth   may help cough and secretions  CPAP use as per patient desire, as unable to pull off mask, may benefit from only n/c o2 QHS  aspiration precautions  Pulmonary toilet as needed  will follow  continue DVT ppx dose Lovenox for R soleal vein dvt, repeat US LE 1 week to eval for propagation.     D/w Dr. Christopher

## 2024-11-18 NOTE — PROGRESS NOTE ADULT - SUBJECTIVE AND OBJECTIVE BOX
Follow-up Pulmonary Progress Note  Chief Complaint : Cerebral infarction    pt seen and examined  on 1 L n/c feels ok  no complaints        Allergies :penicillin (Swelling)      PAST MEDICAL & SURGICAL HISTORY:  GERD (gastroesophageal reflux disease)    Colon polyps    Major depression    Gout    Hypercholesteremia    RHETT on CPAP    S/P cholecystectomy    S/P cataract surgery    S/P skin cancer resection    H/O total hip arthroplasty        Medications:  MEDICATIONS  (STANDING):  albuterol    90 MICROgram(s) HFA Inhaler 2 Puff(s) Inhalation every 6 hours  amLODIPine   Tablet 5 milliGRAM(s) Oral daily  aspirin 325 milliGRAM(s) Oral daily  cholecalciferol 1000 Unit(s) Oral daily  ciprofloxacin     Tablet 500 milliGRAM(s) Oral every 12 hours  clopidogrel Tablet 75 milliGRAM(s) Oral daily  colchicine 0.6 milliGRAM(s) Oral daily  enoxaparin Injectable 40 milliGRAM(s) SubCutaneous every 24 hours  losartan 50 milliGRAM(s) Oral daily  melatonin 9 milliGRAM(s) Oral at bedtime  pantoprazole    Tablet 40 milliGRAM(s) Oral before breakfast  PARoxetine 30 milliGRAM(s) Oral daily  polyethylene glycol 3350 17 Gram(s) Oral two times a day  rosuvastatin 5 milliGRAM(s) Oral daily  tamsulosin 0.4 milliGRAM(s) Oral at bedtime    MEDICATIONS  (PRN):  acetaminophen     Tablet .. 650 milliGRAM(s) Oral every 6 hours PRN Mild Pain (1 - 3)  atropine 1% Ophthalmic Solution for SubLingual Use 1 Drop(s) SubLingual every 8 hours PRN cough, and increased upper airway secretions  bisacodyl Suppository 10 milliGRAM(s) Rectal daily PRN Constipation  senna 2 Tablet(s) Oral at bedtime PRN Constipation      Antibiotics History  ciprofloxacin     Tablet 500 milliGRAM(s) Oral every 12 hours, 11-18-24 @ 11:33, Stop order after: 5 Days      Heme Medications   clopidogrel Tablet 75 milliGRAM(s) Oral daily, 11-14-24 @ 00:00  enoxaparin Injectable 40 milliGRAM(s) SubCutaneous every 24 hours, 11-15-24 @ 12:31      GI Medications  bisacodyl Suppository 10 milliGRAM(s) Rectal daily, 11-13-24 @ 21:43, Routine PRN  pantoprazole    Tablet 40 milliGRAM(s) Oral before breakfast, 11-13-24 @ 21:44, Routine  polyethylene glycol 3350 17 Gram(s) Oral two times a day, 11-13-24 @ 21:45, Routine  senna 2 Tablet(s) Oral at bedtime, 11-13-24 @ 21:53, Routine PRN        LABS:                        12.0   7.00  )-----------( 309      ( 18 Nov 2024 05:57 )             36.2     11-18    143  |  108  |  21  ----------------------------<  101[H]  3.8   |  30  |  0.94    Ca    8.7      18 Nov 2024 05:57    TPro  6.3  /  Alb  2.7[L]  /  TBili  0.5  /  DBili  x   /  AST  32  /  ALT  35  /  AlkPhos  56  11-18             VITALS:  T(C): 36.3 (11-18-24 @ 08:05), Max: 36.4 (11-17-24 @ 20:25)  T(F): 97.4 (11-18-24 @ 08:05), Max: 97.6 (11-17-24 @ 20:25)  HR: 59 (11-18-24 @ 08:05) (54 - 60)  BP: 123/76 (11-18-24 @ 08:05) (113/71 - 123/76)  BP(mean): --  ABP: --  ABP(mean): --  RR: 17 (11-18-24 @ 08:05) (17 - 18)  SpO2: 97% (11-18-24 @ 08:05) (95% - 97%)  CVP(mm Hg): --  CVP(cm H2O): --    Ins and Outs     11-17-24 @ 07:01  -  11-18-24 @ 07:00  --------------------------------------------------------  IN: 0 mL / OUT: 1051 mL / NET: -1051 mL    11-18-24 @ 07:01  -  11-18-24 @ 14:25  --------------------------------------------------------  IN: 0 mL / OUT: 500 mL / NET: -500 mL                I&O's Detail    17 Nov 2024 07:01  -  18 Nov 2024 07:00  --------------------------------------------------------  IN:  Total IN: 0 mL    OUT:    Intermittent Catheterization - Urethral (mL): 1050 mL    Voided (mL): 1 mL  Total OUT: 1051 mL    Total NET: -1051 mL      18 Nov 2024 07:01  -  18 Nov 2024 14:25  --------------------------------------------------------  IN:  Total IN: 0 mL    OUT:    Intermittent Catheterization - Urethral (mL): 500 mL  Total OUT: 500 mL    Total NET: -500 mL

## 2024-11-18 NOTE — PROGRESS NOTE ADULT - ASSESSMENT
TARUN CRESPO is a 70 y/o M w/ PMHx HLD, depression, GERD, gout, colonic polyps,  congenital absence of one kidney, recent cholecystitis s/p cholecystectomy (6/24), RHETT on CPAP orginially [facility] with aphasia, R sided weakness and R facial droop and vigorous cough x 2 weeks s/p COVID vaccine. 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  S/p L ICA terminus occulusion a/p L ICA thrombectomy, no stent, 5 passes TICI 2C  w/ underling L petrous ICA dissection on 11/3 w/ Dr. March. Now admitted to [facility] for functional deficits for initiation of a multidisciplinary rehab program     #L MCA stroke w/ hemorrhagic conversion  #L petrous ICA dissection w/ pesudoaneurysm  -CTH 11/3: hyperdense appearance of L basal ganglia and L frontal lobe gyri, mild LCA edema 2/2 recent ischemia  -CTA H/N 11/3: no evidence of significant stenosis affecting the extra cranial carotid or vertebral arteries. No evidence of aneurysm or significant vascular stenosis at [place] of [place]  -Repeat CTH 11/4: post recent endovascular revascularization of the left internal carotid artery and left middle cerebral artery. Minimally less conspicuous hyperdense appearance of left basal ganglia and left frontal lobe gyri which may represent expected evolution of contrast staining  -MRI brain 11/5: evolving recent infarct in L ICA associated with hemorrhagic transformation of the L basal ganglia. Effacement of cerebral sulci with mass effect causing 0.6 mm rightward shift.   -CTH 11/9: evolving acute to subacute left MCA infarct with evolving left basal ganglia hemorrhagic transformation, new punctuate foci of increased hyperdensity. Stable mass effect and 5mm rightward midline shift  -CTA 11/9: stable left internal carotid artery pseudoaneurysm immediately inferior to the petrous portion of the L ICA at the area of focal dissection.  -S/p L ICA terminus occulusion a/p L ICA thrombectomy, no stent, 5 passes TICI 2C  w/ underling L petrous ICA dissection on 11/3 w/ Dr. March.   -A1c 5.0; LDL 60  -No statin indicated given low LDL  -Aspirin 325mg daily  -Plavix 75mg daily   rosuvastatin 5mg QD  -Will be on DAPT for 3 months and follow up with outpt neurology  -F/u outpatient caridology for Zio-patch to r/o cardioembolic source --ILR to be considered as outpatient    #Urinary Retention   - continue with bladder scans  - agree with starting flomax  - treat constipation  - UA reviewed, +bacteria. with the new retention will empirically treat with cipro. follow culture     # Normocytic anemia (stable)   -Monitor H/H (12.2/37.2- on 11/14)  -F/u outpatient colonoscopy for hx of colon polps  -Transfuse <7 PRN    -Iron studies: Transferrin: 11, TIBC: 170, Iron: 18, Ferritin: 414    # HTN   cw losartan and norvsac     #RHETT  #URI   #Cough   -CXR- negative; repeat ordered per pulm this AM, will f/u result  -CPAP HS --unknown settings will need to confirm with wife  -Albuterol PRN  -On Flovent at home (not available here)  - supplemental O2 PRN, taper to 1 L and maintain sat > 90%    #Gout  -Colchicine 0.6mg daily  -Uric acid 11/12- 6.8     # R Soleal Vein DVT  - Duplex U/S showed DVT below the knee, R soleal vein thrombosis 11/14  - Aspirin 325 mg daily  - Plavix 75 mg daily  - Lovenox 40 mg SubQ daily 11/15  --f/u weekly dopplers    #Depression  -Paxil 30mg daily

## 2024-11-18 NOTE — PROGRESS NOTE ADULT - SUBJECTIVE AND OBJECTIVE BOX
Patient is a 69y old  Male who presents with a chief complaint of L MCA infarct w/ hemorrhagic conversion (17 Nov 2024 10:00)    Patient seen and examined at bedside. Urinary retention requiring straight cath. large bm yesterday.   denies pain/discomfort     ALLERGIES:  penicillin (Swelling)    MEDICATIONS  (STANDING):  albuterol    90 MICROgram(s) HFA Inhaler 2 Puff(s) Inhalation every 6 hours  amLODIPine   Tablet 5 milliGRAM(s) Oral daily  aspirin 325 milliGRAM(s) Oral daily  bisacodyl 10 milliGRAM(s) Oral once  cholecalciferol 1000 Unit(s) Oral daily  ciprofloxacin     Tablet 500 milliGRAM(s) Oral every 12 hours  clopidogrel Tablet 75 milliGRAM(s) Oral daily  colchicine 0.6 milliGRAM(s) Oral daily  enoxaparin Injectable 40 milliGRAM(s) SubCutaneous every 24 hours  losartan 50 milliGRAM(s) Oral daily  melatonin 9 milliGRAM(s) Oral at bedtime  pantoprazole    Tablet 40 milliGRAM(s) Oral before breakfast  PARoxetine 30 milliGRAM(s) Oral daily  polyethylene glycol 3350 17 Gram(s) Oral two times a day  rosuvastatin 5 milliGRAM(s) Oral daily  tamsulosin 0.4 milliGRAM(s) Oral at bedtime    MEDICATIONS  (PRN):  acetaminophen     Tablet .. 650 milliGRAM(s) Oral every 6 hours PRN Mild Pain (1 - 3)  atropine 1% Ophthalmic Solution for SubLingual Use 1 Drop(s) SubLingual every 8 hours PRN cough, and increased upper airway secretions  bisacodyl Suppository 10 milliGRAM(s) Rectal daily PRN Constipation  senna 2 Tablet(s) Oral at bedtime PRN Constipation    Vital Signs Last 24 Hrs  T(F): 97.4 (18 Nov 2024 08:05), Max: 97.6 (17 Nov 2024 20:25)  HR: 59 (18 Nov 2024 08:05) (54 - 60)  BP: 123/76 (18 Nov 2024 08:05) (113/71 - 123/76)  RR: 17 (18 Nov 2024 08:05) (17 - 18)  SpO2: 97% (18 Nov 2024 08:05) (95% - 97%)  I&O's Summary    17 Nov 2024 07:01  -  18 Nov 2024 07:00  --------------------------------------------------------  IN: 0 mL / OUT: 1051 mL / NET: -1051 mL    18 Nov 2024 07:01  -  18 Nov 2024 11:35  --------------------------------------------------------  IN: 0 mL / OUT: 500 mL / NET: -500 mL      BMI (kg/m2): 28.9 (11-13-24 @ 21:34)    PHYSICAL EXAM:  General: NAD, awake, alert  ENT: MMM, no tonsilar exudate  Neck: Supple, No JVD  Lungs: Clear to auscultation bilaterally, no wheezes. Good air entry bilaterally   Cardio: RRR, S1/S2, No murmurs  Abdomen: +fullness, Nontender, Nondistended; Bowel sounds present  Extremities: No calf tenderness, No pitting edema    LABS:                        12.0   7.00  )-----------( 309      ( 18 Nov 2024 05:57 )             36.2       11-18    143  |  108  |  21  ----------------------------<  101  3.8   |  30  |  0.94    Ca    8.7      18 Nov 2024 05:57    TPro  6.3  /  Alb  2.7  /  TBili  0.5  /  DBili  x   /  AST  32  /  ALT  35  /  AlkPhos  56  11-18     Urinalysis Basic - ( 18 Nov 2024 05:57 )    Color: x / Appearance: x / SG: x / pH: x  Gluc: 101 mg/dL / Ketone: x  / Bili: x / Urobili: x   Blood: x / Protein: x / Nitrite: x   Leuk Esterase: x / RBC: x / WBC x   Sq Epi: x / Non Sq Epi: x / Bacteria: x    COVID-19 PCR: [facility] (11-13-24 @ 22:33)    RADIOLOGY & ADDITIONAL TESTS:     Care Discussed with Consultants/Other Providers:

## 2024-11-19 PROCEDURE — 99232 SBSQ HOSP IP/OBS MODERATE 35: CPT

## 2024-11-19 RX ORDER — SENNOSIDES 8.6 MG
2 TABLET ORAL AT BEDTIME
Refills: 0 | Status: DISCONTINUED | OUTPATIENT
Start: 2024-11-19 | End: 2024-11-29

## 2024-11-19 RX ADMIN — ACETAMINOPHEN, DIPHENHYDRAMINE HCL, PHENYLEPHRINE HCL 9 MILLIGRAM(S): 325; 25; 5 TABLET ORAL at 20:56

## 2024-11-19 RX ADMIN — AMLODIPINE BESYLATE 5 MILLIGRAM(S): 10 TABLET ORAL at 05:21

## 2024-11-19 RX ADMIN — POLYETHYLENE GLYCOL 3350 17 GRAM(S): 17 POWDER, FOR SOLUTION ORAL at 12:13

## 2024-11-19 RX ADMIN — Medication 0.6 MILLIGRAM(S): at 12:13

## 2024-11-19 RX ADMIN — CLOPIDOGREL 75 MILLIGRAM(S): 75 TABLET, FILM COATED ORAL at 12:13

## 2024-11-19 RX ADMIN — POLYETHYLENE GLYCOL 3350 17 GRAM(S): 17 POWDER, FOR SOLUTION ORAL at 05:21

## 2024-11-19 RX ADMIN — LOSARTAN POTASSIUM 50 MILLIGRAM(S): 100 TABLET, FILM COATED ORAL at 05:25

## 2024-11-19 RX ADMIN — PANTOPRAZOLE SODIUM 40 MILLIGRAM(S): 40 TABLET, DELAYED RELEASE ORAL at 05:21

## 2024-11-19 RX ADMIN — Medication 1000 UNIT(S): at 12:12

## 2024-11-19 RX ADMIN — Medication 500 MILLIGRAM(S): at 05:21

## 2024-11-19 RX ADMIN — TAMSULOSIN HYDROCHLORIDE 0.4 MILLIGRAM(S): 0.4 CAPSULE ORAL at 20:57

## 2024-11-19 RX ADMIN — ACETAMINOPHEN 500MG 650 MILLIGRAM(S): 500 TABLET, COATED ORAL at 20:56

## 2024-11-19 RX ADMIN — ENOXAPARIN SODIUM 40 MILLIGRAM(S): 30 INJECTION SUBCUTANEOUS at 18:07

## 2024-11-19 RX ADMIN — Medication 2 TABLET(S): at 20:56

## 2024-11-19 RX ADMIN — ACETAMINOPHEN 500MG 650 MILLIGRAM(S): 500 TABLET, COATED ORAL at 21:26

## 2024-11-19 RX ADMIN — PAROXETINE HYDROCHLORIDE HEMIHYDRATE 30 MILLIGRAM(S): 37.5 TABLET, FILM COATED, EXTENDED RELEASE ORAL at 12:12

## 2024-11-19 RX ADMIN — ACETAMINOPHEN 500MG 650 MILLIGRAM(S): 500 TABLET, COATED ORAL at 13:38

## 2024-11-19 RX ADMIN — Medication 10 MILLIGRAM(S): at 18:45

## 2024-11-19 RX ADMIN — Medication 325 MILLIGRAM(S): at 12:14

## 2024-11-19 RX ADMIN — ROSUVASTATIN CALCIUM 5 MILLIGRAM(S): 5 TABLET, FILM COATED ORAL at 12:13

## 2024-11-19 RX ADMIN — Medication 500 MILLIGRAM(S): at 18:06

## 2024-11-19 RX ADMIN — Medication 2 PUFF(S): at 16:07

## 2024-11-19 NOTE — PROGRESS NOTE ADULT - ASSESSMENT
70 y/o M w/ PMHx HLD, depression, GERD, gout, colonic polyps,  congenital absence of one kidney, recent cholecystitis s/p cholecystectomy (6/24), RHETT on CPAP orginially presented to [facility] on 11/3/24 with 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  S/p L ICA terminus occulusion a/p L ICA thrombectomy,  w/ underling L petrous ICA dissection     # L MCA stroke w/ hemorrhagic conversion  # L petrous ICA dissection w/ pesudoaneurysm  - CTH 11/9: evolving acute to subacute left MCA infarct with evolving left basal ganglia hemorrhagic transformation, new punctuate foci of increased hyperdensity. Stable mass effect and 5mm rightward midline shift  - Aspirin 325mg daily  - Plavix 75mg daily   - Rosuvastatin 5mg QD  - outpatient cardiology f/u for w/u r/o cardioembolic source  - continue comprehensive rehab program, 3 hours a day, 5 days a week.    # R Soleal Vein DVT  - Duplex U/S showed DVT below the knee, R soleal vein thrombosis 11/14  - Lovenox 40 mg SubQ daily 11/15  - f/u weekly dopplers, ordered for 11/21    #Urinary Retention, unimproved  #Constipation, resolving  -continue bladder scans  -UA without gross contamination, but per hospitalist will start cipro 11/18 empirically and send urine culture.    -KUB 11/17: Nonobstructed bowel gas pattern. Enteric contrast throughout the colon with moderately large stool burden.  -LBM recorded 11/17  -KUB ordered 11/18, follow results  -flomax 0.4 initiated daily 11/18  -CTM constipation, dulcolax suppository PRN ordered.     # Normocytic anemia (stable) vs Iron deficiency anemia   - F/u outpatient colonoscopy for hx of colon polps  - Transfuse <7 PRN    - Iron studies: Transferrin: 11, TIBC: 170, Iron: 18, Ferritin: 414  - monitor H/H. Hgb 12.0 11/18 stable  - CBC 11/21    # RHETT  # Activity-Induced Hypoxia  - CXR- negative   - CPAP HS 5 50% O2. Poor compliance  - O2 via NC PRN  - O2 up to 3L NC as needed during therapy  - Albuterol PRN    # Gout  - Colchicine 0.6mg daily  - Uric acid 11/12- 6.8     # Mood/Cognition  # Depression  - Paxil 30mg daily     # Sleep:   - Increase melatonin 9 qhs 11/16. Improved sleep per patient    # Pain Management:  - Tylenol PRN    # GI/Bowel:  - Senna QHS  - Miralax BIDD  - Bisacodyl suppository QD PRN  - GI ppx: Pantoprazole 40mg daily   - constipation with abdominal discomfort: will give dulcolax suppository. If persists, AXR  - per SLP, will initiate free water protocol     # Dysphagia:  - S/p MBS on 11/12--> diet: Easy to chew; mild thick liquids- DASH  - 1:1 feeds    # LABS  CBC CMP 11/21   monitor BM        ---------------  Code Status: FULL  Emergency Contact:    Outpatient Follow-up (Specialty/Name of physician):  Dr. Cheng March  Neurology  12/24/24 appt     Cardiology    ENT  [phone]           Nutritional Assessment:  · Nutritional Assessment	1. Continue Easy to Chew, Mildly Thickened Liquid diet.   2. Recommend Ensure Plus High Protein Daily 8 oz (Provides 350 kcal, 20 grams of protein) 1x/day to assist pt in meeting estimated protein energy needs.  3. Patient requires assist with feeds; Restorative Dining Room    4. Monitor PO intake, GI tolerance, skin integrity, labs, weight, and bowel movement regularity.   5. Honor food preferences as feasible. Assist with meals PRN and encourage PO intake.  6. Follow SLP recommendations  7. Provide ongoing diet education as needed  8. RD remains available upon request and will follow-up per protocol     70 y/o M w/ PMHx HLD, depression, GERD, gout, colonic polyps,  congenital absence of one kidney, recent cholecystitis s/p cholecystectomy (6/24), RHETT on CPAP orginially presented to [facility] on 11/3/24 with 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  S/p L ICA terminus occulusion a/p L ICA thrombectomy,  w/ underling L petrous ICA dissection     # L MCA stroke w/ hemorrhagic conversion  # L petrous ICA dissection w/ pesudoaneurysm  - CTH 11/9: evolving acute to subacute left MCA infarct with evolving left basal ganglia hemorrhagic transformation, new punctuate foci of increased hyperdensity. Stable mass effect and 5mm rightward midline shift  - Aspirin 325mg daily  - Plavix 75mg daily   - Rosuvastatin 5mg QD  - outpatient cardiology f/u for w/u r/o cardioembolic source  - continue comprehensive rehab program, 3 hours a day, 5 days a week.    # R Soleal Vein DVT  - Duplex U/S showed DVT below the knee, R soleal vein thrombosis 11/14  - Lovenox 40 mg SubQ daily 11/15  - f/u weekly dopplers, ordered for 11/21    #Urinary Retention, unimproved  #Constipation, resolving  -continue bladder scans  -UA without gross contamination, but per hospitalist will start cipro 11/18 empirically and send urine culture.    -KUB 11/17: Nonobstructed bowel gas pattern. Enteric contrast throughout the colon with moderately large stool burden.  -LBM recorded 11/17  -KUB ordered 11/18, follow results  -flomax 0.4 initiated daily 11/18  -CTM constipation, dulcolax suppository PRN ordered.     # Normocytic anemia (stable) vs Iron deficiency anemia   - F/u outpatient colonoscopy for hx of colon polps  - Transfuse <7 PRN    - Iron studies: Transferrin: 11, TIBC: 170, Iron: 18, Ferritin: 414  - monitor H/H. Hgb 12.0 11/18 stable  - CBC 11/21    # RHETT  # Activity-Induced Hypoxia  - CXR- negative   - CPAP HS 5 50% O2. Poor compliance  - O2 via NC PRN  - O2 up to 3L NC as needed during therapy  - Albuterol PRN    # Gout  - Colchicine 0.6mg daily  - Uric acid 11/12- 6.8     # Mood/Cognition  # Depression  - Paxil 30mg daily     # Sleep:   - Increase melatonin 9 qhs 11/16. Improved sleep per patient    # Pain Management:  - Tylenol PRN    # GI/Bowel:  - Senna QHS  - Miralax BIDD  - Bisacodyl suppository QD PRN  - GI ppx: Pantoprazole 40mg daily   - constipation with abdominal discomfort: will give dulcolax suppository. If persists, AXR  - per SLP, will initiate free water protocol     # Dysphagia:  - S/p MBS on 11/12--> diet: Easy to chew; mild thick liquids- DASH  - 1:1 feeds    # LABS  CBC CMP 11/21   monitor BM    Letter for cruise ship cancellation due to patient's injury.     ---------------  Code Status: FULL  Emergency Contact:    Outpatient Follow-up (Specialty/Name of physician):  Dr. Cheng March  Neurology  12/24/24 appt     Cardiology    ENT  [phone]           Nutritional Assessment:  · Nutritional Assessment	1. Continue Easy to Chew, Mildly Thickened Liquid diet.   2. Recommend Ensure Plus High Protein Daily 8 oz (Provides 350 kcal, 20 grams of protein) 1x/day to assist pt in meeting estimated protein energy needs.  3. Patient requires assist with feeds; Restorative Dining Room    4. Monitor PO intake, GI tolerance, skin integrity, labs, weight, and bowel movement regularity.   5. Honor food preferences as feasible. Assist with meals PRN and encourage PO intake.  6. Follow SLP recommendations  7. Provide ongoing diet education as needed  8. RD remains available upon request and will follow-up per protocol     70 y/o M w/ PMHx HLD, depression, GERD, gout, colonic polyps,  congenital absence of one kidney, recent cholecystitis s/p cholecystectomy (6/24), RHETT on CPAP orginially presented to [facility] on 11/3/24 with 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  S/p L ICA terminus occulusion a/p L ICA thrombectomy,  w/ underling L petrous ICA dissection     # L MCA stroke w/ hemorrhagic conversion  # L petrous ICA dissection w/ pesudoaneurysm  - CTH 11/9: evolving acute to subacute left MCA infarct with evolving left basal ganglia hemorrhagic transformation, new punctuate foci of increased hyperdensity. Stable mass effect and 5mm rightward midline shift  - Aspirin 325mg daily  - Plavix 75mg daily   - Rosuvastatin 5mg QD  - outpatient cardiology f/u for w/u r/o cardioembolic source  - continue comprehensive rehab program, 3 hours a day, 5 days a week.    # R Soleal Vein DVT  - Duplex U/S showed DVT below the knee, R soleal vein thrombosis 11/14  - Lovenox 40 mg SubQ daily 11/15  - f/u weekly dopplers, ordered for 11/21    #Urinary Retention, unimproved  #Constipation, resolving  -continue bladder scans  -UA without gross contamination, but per hospitalist will start cipro 11/18 empirically and send urine culture.    -KUB 11/17: Nonobstructed bowel gas pattern. Enteric contrast throughout the colon with moderately large stool burden.  -LBM recorded 11/17  -KUB ordered 11/18, follow results  -flomax 0.4 initiated daily 11/18  -CTM constipation, dulcolax suppository PRN ordered.     UTI--  -- u/a +  -- on cipro  -- urine Cx +Enteroccocus  --may likely be contributing to  retention    # Normocytic anemia (stable) vs Iron deficiency anemia   - F/u outpatient colonoscopy for hx of colon polps  - Transfuse <7 PRN    - Iron studies: Transferrin: 11, TIBC: 170, Iron: 18, Ferritin: 414  - monitor H/H. Hgb 12.0 11/18 stable  - CBC 11/21    # RHETT  # Activity-Induced Hypoxia  - CXR- negative   - CPAP HS 5 50% O2. Poor compliance  - O2 via NC PRN  - O2 up to 3L NC as needed during therapy  - Albuterol PRN    # Gout  - Colchicine 0.6mg daily  - Uric acid 11/12- 6.8     # Mood/Cognition  # Depression  - Paxil 30mg daily     # Sleep:   - Increase melatonin 9 qhs 11/16. Improved sleep per patient    # Pain Management:  - Tylenol PRN    # GI/Bowel:  - Senna QHS  - Miralax BIDD  - Bisacodyl suppository QD PRN  - GI ppx: Pantoprazole 40mg daily   - constipation with abdominal discomfort: will give dulcolax suppository. If persists, AXR  - per SLP, will initiate free water protocol     # Dysphagia:  - S/p MBS on 11/12--> diet: Easy to chew; mild thick liquids- DASH  - 1:1 feeds    # LABS  CBC CMP 11/21   monitor BM    Letter for cruise ship cancellation due to patient's injury.     IDT 11/19  Omero RASMUSSEN - incontinent of bowel, requiring SC  SW - condo with wife, 3-4 arely. elevator to 7th floor.  Independent prior, driving and working.  Wife is retired, main support and she drives.  SLP - easy to chew with mildly thick liquids.  Severe apraxia, aphasia, dysarthria. He can approximate single words with multiple cues.  He has picture board for AAC.  On free water protocol.  Repeat MBS end of this week or beginning of next week.  Suspect cog deficits but difficult to determine due to apraxia and dysarthria, and aphasia.  OT - supervision with eating, jose l for oral hygiene, maxA for bathing, UBD, and totA for toileting, LBD, commode transfer.  No shower transfer yet.  Today, mod2 for squat pivot, no active movement UE.  Therapy requesting max time.  Goals are jose l at WC level.  PT - bed mob maxA; transfer totA; non ambulatory.  Goal is Jose L  Goal 1 - use multimodalities to communicate basic wants and needs with staff max cues.     Goal 2 - transfer OOB with minimal assist  barriers - communication, hemiplegia, sensory impairment, aphasia, poor endurance.    admit GC 11/13.    CMG is 12/5.  DC 12/5 to [facility]      ---------------  Code Status: FULL  Emergency Contact:    Outpatient Follow-up (Specialty/Name of physician):  Dr. Cheng March  Neurology  12/24/24 appt     Cardiology    ENT  [phone]           Nutritional Assessment:  · Nutritional Assessment	1. Continue Easy to Chew, Mildly Thickened Liquid diet.   2. Recommend Ensure Plus High Protein Daily 8 oz (Provides 350 kcal, 20 grams of protein) 1x/day to assist pt in meeting estimated protein energy needs.  3. Patient requires assist with feeds; Restorative Dining Room    4. Monitor PO intake, GI tolerance, skin integrity, labs, weight, and bowel movement regularity.   5. Honor food preferences as feasible. Assist with meals PRN and encourage PO intake.  6. Follow SLP recommendations  7. Provide ongoing diet education as needed  8. RD remains available upon request and will follow-up per protocol     70 y/o M w/ PMHx HLD, depression, GERD, gout, colonic polyps,  congenital absence of one kidney, recent cholecystitis s/p cholecystectomy (6/24), RHETT on CPAP orginially presented to [facility] on 11/3/24 with 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  S/p L ICA terminus occulusion a/p L ICA thrombectomy,  w/ underling L petrous ICA dissection     # L MCA stroke w/ hemorrhagic conversion  # L petrous ICA dissection w/ pesudoaneurysm  - CTH 11/9: evolving acute to subacute left MCA infarct with evolving left basal ganglia hemorrhagic transformation, new punctuate foci of increased hyperdensity. Stable mass effect and 5mm rightward midline shift  - Aspirin 325mg daily  - Plavix 75mg daily   - Rosuvastatin 5mg QD  - outpatient cardiology f/u for w/u r/o cardioembolic source  - continue comprehensive rehab program, 3 hours a day, 5 days a week.    # R Soleal Vein DVT  - Duplex U/S showed DVT below the knee, R soleal vein thrombosis 11/14  - Lovenox 40 mg SubQ daily 11/15  - f/u weekly dopplers, ordered for 11/21    #Urinary Retention, unimproved  #Constipation, resolving  -continue bladder scans  -UA without gross contamination, but per hospitalist will start cipro 11/18 empirically and send urine culture.    -KUB 11/17: Nonobstructed bowel gas pattern. Enteric contrast throughout the colon with moderately large stool burden.  -LBM recorded 11/17  -KUB ordered 11/18, follow results  -flomax 0.4 initiated daily 11/18  -CTM constipation, dulcolax suppository PRN ordered.     UTI--  -- u/a +  -- on cipro  -- urine Cx +Enteroccocus  --may likely be contributing to  retention    # Normocytic anemia (stable) vs Iron deficiency anemia   - F/u outpatient colonoscopy for hx of colon polps  - Transfuse <7 PRN    - Iron studies: Transferrin: 11, TIBC: 170, Iron: 18, Ferritin: 414  - monitor H/H. Hgb 12.0 11/18 stable  - CBC 11/21    # RHETT  # Activity-Induced Hypoxia  - CXR- negative   - CPAP HS 5 50% O2. Poor compliance  - O2 via NC PRN  - O2 up to 3L NC as needed during therapy  - Albuterol PRN    # Gout  - Colchicine 0.6mg daily  - Uric acid 11/12- 6.8     # Mood/Cognition  # Depression  - Paxil 30mg daily     # Sleep:   - Increase melatonin 9 qhs 11/16. Improved sleep per patient    # Pain Management:  - Tylenol PRN    # GI/Bowel:  - Senna QHS  - Miralax BIDD  - Bisacodyl suppository QD PRN  - GI ppx: Pantoprazole 40mg daily   - constipation with abdominal discomfort: will give dulcolax suppository. If persists, AXR  - per SLP, will initiate free water protocol     # Dysphagia:  - S/p MBS on 11/12--> diet: Easy to chew; mild thick liquids- DASH  - 1:1 feeds    # LABS  CBC CMP 11/21   monitor BM    Letter for cruise ship cancellation due to patient's injury.     IDT 11/19  Omero  RN - incontinent of bowel, requiring SC  SW - condo with wife, 3-4 arely. elevator to 7th floor.  Independent prior, driving and working.  Wife is retired, main support and she drives.  SLP - easy to chew with mildly thick liquids.  Severe apraxia, aphasia, dysarthria. He can approximate single words with multiple cues.  He has picture board for AAC.  On free water protocol.  Repeat MBS end of this week or beginning of next week.  Suspect cog deficits but difficult to determine due to apraxia and dysarthria, and aphasia.  OT - supervision with eating, jose l for oral hygiene, maxA for bathing, UBD, and totA for toileting, LBD, commode transfer.  No shower transfer yet.  Today, mod2 for squat pivot, no active movement UE.  Therapy requesting max time.  Goals are jose l at WC level.  PT - bed mob maxA; transfer totA; non ambulatory.  Goal is Jose L  Goal 1 - use multimodalities to communicate basic wants and needs with staff max cues.     Goal 2 - transfer OOB with minimal assist  barriers - communication, hemiplegia, sensory impairment, aphasia, poor endurance.    admit GC 11/13.    CMG is 12/5.  DC 12/5 to [facility] called Ms Henry Omero 11/19 and spoke with her to update her on Michael's medical status, as well as his functional status and planned discharge date and disposition plan.    ---------------  Code Status: FULL  Emergency Contact:    Outpatient Follow-up (Specialty/Name of physician):  Dr. Cheng March  Neurology  12/24/24 appt     Cardiology    ENT  [phone]           Nutritional Assessment:  · Nutritional Assessment	1. Continue Easy to Chew, Mildly Thickened Liquid diet.   2. Recommend Ensure Plus High Protein Daily 8 oz (Provides 350 kcal, 20 grams of protein) 1x/day to assist pt in meeting estimated protein energy needs.  3. Patient requires assist with feeds; Restorative Dining Room    4. Monitor PO intake, GI tolerance, skin integrity, labs, weight, and bowel movement regularity.   5. Honor food preferences as feasible. Assist with meals PRN and encourage PO intake.  6. Follow SLP recommendations  7. Provide ongoing diet education as needed  8. RD remains available upon request and will follow-up per protocol

## 2024-11-19 NOTE — PROGRESS NOTE ADULT - ASSESSMENT
TARUN CRESPO is a 70 y/o M w/ PMHx HLD, depression, GERD, gout, colonic polyps,  congenital absence of one kidney, recent cholecystitis s/p cholecystectomy (6/24), RHETT on CPAP orginially presented to [facility] on 11/3 with aphasia, R sided weakness and R facial droop and vigorous cough x 2 weeks s/p COVID vaccine. 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  S/p L ICA terminus occulusion a/p L ICA thrombectomy, no stent, 5 passes TICI 2C  w/ underling L petrous ICA dissection on 11/3 w/ Dr. March. Now admitted to [facility] for functional deficits for initiation of a multidisciplinary rehab program     #L MCA stroke w/ hemorrhagic conversion  #L petrous ICA dissection w/ pesudoaneurysm  -CTH 11/3: hyperdense appearance of L basal ganglia and L frontal lobe gyri, mild LCA edema 2/2 recent ischemia  -CTA H/N 11/3: no evidence of significant stenosis affecting the extra cranial carotid or vertebral arteries. No evidence of aneurysm or significant vascular stenosis at [place]  -Repeat CTH 11/4: post recent endovascular revascularization of the left internal carotid artery and left middle cerebral artery. Minimally less conspicuous hyperdense appearance of left basal ganglia and left frontal lobe gyri which may represent expected evolution of contrast staining  -MRI brain 11/5: evolving recent infarct in L ICA associated with hemorrhagic transformation of the L basal ganglia. Effacement of cerebral sulci with mass effect causing 0.6 mm rightward shift.   -CTH 11/9: evolving acute to subacute left MCA infarct with evolving left basal ganglia hemorrhagic transformation, new punctuate foci of increased hyperdensity. Stable mass effect and 5mm rightward midline shift  -CTA 11/9: stable left internal carotid artery pseudoaneurysm immediately inferior to the petrous portion of the L ICA at the area of focal dissection.  -S/p L ICA terminus occulusion a/p L ICA thrombectomy, no stent, 5 passes TICI 2C  w/ underling L petrous ICA dissection on 11/3 w/ Dr. March.   -A1c 5.0; LDL 60  -No statin indicated given low LDL  -Aspirin 325mg daily  -Plavix 75mg daily   rosuvastatin 5mg QD  -Will be on DAPT for 3 months and follow up with outpt neurology  -F/u outpatient caridology for Zio-patch to r/o cardioembolic source --ILR to be considered as outpatient    #Urinary Retention   - continue with bladder scans  - flomas  - treat constipation aggressively   - UA reviewed, +bacteria. with the new retention will empirically treat with cipro. follow culture     # Normocytic anemia (stable)   -Monitor H/H (12.2/37.2- on 11/14)  -F/u outpatient colonoscopy for hx of colon polps  -Transfuse <7 PRN    -Iron studies: Transferrin: 11, TIBC: 170, Iron: 18, Ferritin: 414    # HTN   cw losartan and norvsac     #RHETT  #URI   #Cough   -CXR- negative; repeat ordered per pulm this AM, will f/u result  -CPAP HS --unknown settings will need to confirm with wife  -Albuterol PRN  -On Flovent at home (not available here)  - supplemental O2 PRN, taper to 1 L and maintain sat > 90%    #Gout  -Colchicine 0.6mg daily  -Uric acid 11/12- 6.8     # R Soleal Vein DVT  - Duplex U/S showed DVT below the knee, R soleal vein thrombosis 11/14  - Aspirin 325 mg daily  - Plavix 75 mg daily  - Lovenox 40 mg SubQ daily 11/15  --f/u weekly dopplers    #Depression  -Paxil 30mg daily

## 2024-11-19 NOTE — PROGRESS NOTE ADULT - SUBJECTIVE AND OBJECTIVE BOX
HPI:  68 y/o M w/ PMHx HLD, depression, GERD, gout, colonic polyps,  congenital absence of one kidney, recent cholecystitis s/p cholecystectomy (6/24), RHETT on CPAP orginially presented to [facility] on 11/3 with aphasia, R sided weakness and R facial droop and vigorous cough x 2 weeks s/p COVID vaccine. LKW 2045 on 11/2. 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 Patient transferred to [facility] for mechanical thrombectomy. S/p L ICA terminus occulusion a/p L ICA thrombectomy, no stent, 5 passes TICI 2C  w/ underling L petrous ICA dissection on 11/3 w/ Dr. March. Post op course c/b slight pseudoaneurysm enlargement w/ dissection distal embolisim into the terminal ICA seen on 11/4 CTA and hemorrhagic transformation of L MCA infarct.   Of note, hospital course also significant for fevers, suspect aspiration pneumonitis treated with Rocephin x 1, and dysphagia requiring easy to chew,  with mod thick liquids. EP was consulted with concerns of cardioembolic source for infarct, recommended to f/u outpatient for Zio patch.   Patient had an MBS on 11/12 which showed moderate oropharyngeal dysphagia, remarkable for delayed/reduced laryngeal vestibular closure, pharyngeal trigger delay and reduced hyolaryngeal elevation/excursion.  There was deep laryngeal penetration and possibly aspiration of thin liquid.  Patient was cleared for easy to chew GI diet with mildly thick liquids.    Patient optimized and was evaluated by PM&R and therapy for functional deficits, gait/ADL impairments and acute rehabilitation was recommended. Patient was cleared for discharge to [facility] IRU on 11/13/24.  (13 Nov 2024 15:44)      Subjective:  Patient was seen at bedside today. He doesn't verbally respond and responds with head nods and shaking his head. He was able to say morning today.   He denies abdominal pain, headache, dizziness, chest pain.   Admits to not urinating and requiring ISC. Having 500cc every 6-8hours.  Tolerating flomax, which was started yesterday.   Pt. tolerating PO intake and sleeping well.     Vital Signs Last 24 Hrs  T(C): 36.5 (19 Nov 2024 07:46), Max: 36.7 (18 Nov 2024 19:35)  T(F): 97.7 (19 Nov 2024 07:46), Max: 98.1 (18 Nov 2024 19:35)  HR: 61 (19 Nov 2024 07:46) (60 - 62)  BP: 107/71 (19 Nov 2024 07:46) (107/71 - 121/69)  BP(mean): --  RR: 16 (19 Nov 2024 07:46) (16 - 16)  SpO2: 97% (19 Nov 2024 07:46) (97% - 97%)    Parameters below as of 19 Nov 2024 07:46  Patient On (Oxygen Delivery Method): nasal cannula  O2 Flow (L/min): 2        REVIEW OF SYMPTOMS  Neurological deficits      PHYSICAL EXAM:   Constitutional - NAD, Comfortable   HEENT - NCAT, EOMI   Neck - Supple, No limited ROM  Chest - good chest expansion, good respiratory effort, CTAB    Cardio - warm and well perfused, RRR, no murmur   Abdomen -  Soft, NTND   Extremities - No peripheral edema, No calf tenderness      Neurologic Exam:                       Cognitive -              Orientation: Awake, Alert, Orientation limited due to aphasia            Attention:  limited due to aphasia; can follow simple 1 step commands; 2 step commands with delayed processing            Memory: unable to test due to aphasia     Speech – + Aphasia--Nonfluent, Impaired multiple step command following, but able to follow all simple commands, impaired repetition; + dysarthria,  able to phonate "aah" but did not phonate or verbalize any words.       Cranial Nerves – PERRLA , EOMI, + left facial weakness; facial sensation impaired on right, + dysarthria, + dysphagia, Shoulder shrug impaired        Motor -                        LEFT    UE - ShAB 5/5, EF 5/5, EE 5/5, WE 5/5,  5/5                     RIGHT UE - ShAB 0/5, EF 0/5, EE 0/5, WE 0/5,  0/5                     LEFT    LE - HF 5/5, KE 5/5, DF 5/5, PF 5/5                     RIGHT LE - HF 0/5, KE 0/5, DF 0/5, PF 0/5        Coordination - FTN/HTS intact on the left; unable to assess due to weaknes son the right     Sensory – Impaired to LT  on right side;-- withdraws to pain Left LE but no response left UE     Reflexes - DTR  2/ 4 , neg Soria's b/l,  Tone: no increased tone    MSK: right shoulder PROM WNL, +sublux  Psychiatric - Mood stable, Affect WNL   Skin on admission: intact      RECENT LABS                          12.0   7.00  )-----------( 309      ( 18 Nov 2024 05:57 )             36.2     11-18    143  |  108  |  21  ----------------------------<  101[H]  3.8   |  30  |  0.94    Ca    8.7      18 Nov 2024 05:57    TPro  6.3  /  Alb  2.7[L]  /  TBili  0.5  /  DBili  x   /  AST  32  /  ALT  35  /  AlkPhos  56  11-18      Urinalysis Basic - ( 18 Nov 2024 05:57 )    Color: x / Appearance: x / SG: x / pH: x  Gluc: 101 mg/dL / Ketone: x  / Bili: x / Urobili: x   Blood: x / Protein: x / Nitrite: x   Leuk Esterase: x / RBC: x / WBC x   Sq Epi: x / Non Sq Epi: x / Bacteria: x    Culture - Urine (11.18.24 @ 10:20)   Specimen Source: Catheterized Catheterized  Culture Results:   10,000 - 49,000 CFU/mL Gram positive organisms      RADIOLOGY/OTHER RESULTS      MEDICATIONS  (STANDING):  albuterol    90 MICROgram(s) HFA Inhaler 2 Puff(s) Inhalation every 6 hours  amLODIPine   Tablet 5 milliGRAM(s) Oral daily  aspirin 325 milliGRAM(s) Oral daily  cholecalciferol 1000 Unit(s) Oral daily  ciprofloxacin     Tablet 500 milliGRAM(s) Oral every 12 hours  clopidogrel Tablet 75 milliGRAM(s) Oral daily  colchicine 0.6 milliGRAM(s) Oral daily  enoxaparin Injectable 40 milliGRAM(s) SubCutaneous every 24 hours  losartan 50 milliGRAM(s) Oral daily  melatonin 9 milliGRAM(s) Oral at bedtime  pantoprazole    Tablet 40 milliGRAM(s) Oral before breakfast  PARoxetine 30 milliGRAM(s) Oral daily  polyethylene glycol 3350 17 Gram(s) Oral two times a day  rosuvastatin 5 milliGRAM(s) Oral daily  tamsulosin 0.4 milliGRAM(s) Oral at bedtime    MEDICATIONS  (PRN):  acetaminophen     Tablet .. 650 milliGRAM(s) Oral every 6 hours PRN Mild Pain (1 - 3)  atropine 1% Ophthalmic Solution for SubLingual Use 1 Drop(s) SubLingual every 8 hours PRN cough, and increased upper airway secretions  bisacodyl Suppository 10 milliGRAM(s) Rectal daily PRN Constipation  senna 2 Tablet(s) Oral at bedtime PRN Constipation               HPI:  70 y/o M w/ PMHx HLD, depression, GERD, gout, colonic polyps,  congenital absence of one kidney, recent cholecystitis s/p cholecystectomy (6/24), RHETT on CPAP orginially presented to [facility] on 11/3 with aphasia, R sided weakness and R facial droop and vigorous cough x 2 weeks s/p COVID vaccine. LKW 2045 on 11/2. 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 Patient transferred to [facility] for mechanical thrombectomy. S/p L ICA terminus occulusion a/p L ICA thrombectomy, no stent, 5 passes TICI 2C  w/ underling L petrous ICA dissection on 11/3 w/ Dr. March. Post op course c/b slight pseudoaneurysm enlargement w/ dissection distal embolisim into the terminal ICA seen on 11/4 CTA and hemorrhagic transformation of L MCA infarct.   Of note, hospital course also significant for fevers, suspect aspiration pneumonitis treated with Rocephin x 1, and dysphagia requiring easy to chew,  with mod thick liquids. EP was consulted with concerns of cardioembolic source for infarct, recommended to f/u outpatient for Zio patch.   Patient had an MBS on 11/12 which showed moderate oropharyngeal dysphagia, remarkable for delayed/reduced laryngeal vestibular closure, pharyngeal trigger delay and reduced hyolaryngeal elevation/excursion.  There was deep laryngeal penetration and possibly aspiration of thin liquid.  Patient was cleared for easy to chew GI diet with mildly thick liquids.    Patient optimized and was evaluated by PM&R and therapy for functional deficits, gait/ADL impairments and acute rehabilitation was recommended. Patient was cleared for discharge to [facility] IRU on 11/13/24.  (13 Nov 2024 15:44)      Subjective:  Patient was seen at bedside today. He doesn't verbally respond and responds with head nods and shaking his head. He was able to say morning today.   He denies abdominal pain, headache, dizziness, chest pain.   Admits to not urinating and requiring ISC. Having 500cc every 6-8hours.  Tolerating flomax, which was started yesterday.   Pt. tolerating PO intake and sleeping well.   Patient's wife is asking for a letter for cruise ship cancellation.     Vital Signs Last 24 Hrs  T(C): 36.5 (19 Nov 2024 07:46), Max: 36.7 (18 Nov 2024 19:35)  T(F): 97.7 (19 Nov 2024 07:46), Max: 98.1 (18 Nov 2024 19:35)  HR: 61 (19 Nov 2024 07:46) (60 - 62)  BP: 107/71 (19 Nov 2024 07:46) (107/71 - 121/69)  BP(mean): --  RR: 16 (19 Nov 2024 07:46) (16 - 16)  SpO2: 97% (19 Nov 2024 07:46) (97% - 97%)    Parameters below as of 19 Nov 2024 07:46  Patient On (Oxygen Delivery Method): nasal cannula  O2 Flow (L/min): 2        REVIEW OF SYMPTOMS  Neurological deficits      PHYSICAL EXAM:   Constitutional - NAD, Comfortable   HEENT - NCAT, EOMI   Neck - Supple, No limited ROM  Chest - good chest expansion, good respiratory effort, CTAB    Cardio - warm and well perfused, RRR, no murmur   Abdomen -  Soft, NTND   Extremities - No peripheral edema, No calf tenderness      Neurologic Exam:                       Cognitive -              Orientation: Awake, Alert, Orientation limited due to aphasia            Attention:  limited due to aphasia; can follow simple 1 step commands; 2 step commands with delayed processing            Memory: unable to test due to aphasia     Speech – + Aphasia--Nonfluent, Impaired multiple step command following, but able to follow all simple commands, impaired repetition; + dysarthria,  able to phonate "aah" but did not phonate or verbalize any words.       Cranial Nerves – PERRLA , EOMI, + left facial weakness; facial sensation impaired on right, + dysarthria, + dysphagia, Shoulder shrug impaired        Motor -                        LEFT    UE - ShAB 5/5, EF 5/5, EE 5/5, WE 5/5,  5/5                     RIGHT UE - ShAB 0/5, EF 0/5, EE 0/5, WE 0/5,  0/5                     LEFT    LE - HF 5/5, KE 5/5, DF 5/5, PF 5/5                     RIGHT LE - HF 0/5, KE 0/5, DF 0/5, PF 0/5        Coordination - FTN/HTS intact on the left; unable to assess due to weaknes son the right     Sensory – Impaired to LT  on right side;-- withdraws to pain Left LE but no response left UE     Reflexes - DTR  2/ 4 , neg Soria's b/l,  Tone: no increased tone    MSK: right shoulder PROM WNL, +sublux  Psychiatric - Mood stable, Affect WNL   Skin on admission: intact      RECENT LABS                          12.0   7.00  )-----------( 309      ( 18 Nov 2024 05:57 )             36.2     11-18    143  |  108  |  21  ----------------------------<  101[H]  3.8   |  30  |  0.94    Ca    8.7      18 Nov 2024 05:57    TPro  6.3  /  Alb  2.7[L]  /  TBili  0.5  /  DBili  x   /  AST  32  /  ALT  35  /  AlkPhos  56  11-18      Urinalysis Basic - ( 18 Nov 2024 05:57 )    Color: x / Appearance: x / SG: x / pH: x  Gluc: 101 mg/dL / Ketone: x  / Bili: x / Urobili: x   Blood: x / Protein: x / Nitrite: x   Leuk Esterase: x / RBC: x / WBC x   Sq Epi: x / Non Sq Epi: x / Bacteria: x    Culture - Urine (11.18.24 @ 10:20)   Specimen Source: Catheterized Catheterized  Culture Results:   10,000 - 49,000 CFU/mL Gram positive organisms      RADIOLOGY/OTHER RESULTS      MEDICATIONS  (STANDING):  albuterol    90 MICROgram(s) HFA Inhaler 2 Puff(s) Inhalation every 6 hours  amLODIPine   Tablet 5 milliGRAM(s) Oral daily  aspirin 325 milliGRAM(s) Oral daily  cholecalciferol 1000 Unit(s) Oral daily  ciprofloxacin     Tablet 500 milliGRAM(s) Oral every 12 hours  clopidogrel Tablet 75 milliGRAM(s) Oral daily  colchicine 0.6 milliGRAM(s) Oral daily  enoxaparin Injectable 40 milliGRAM(s) SubCutaneous every 24 hours  losartan 50 milliGRAM(s) Oral daily  melatonin 9 milliGRAM(s) Oral at bedtime  pantoprazole    Tablet 40 milliGRAM(s) Oral before breakfast  PARoxetine 30 milliGRAM(s) Oral daily  polyethylene glycol 3350 17 Gram(s) Oral two times a day  rosuvastatin 5 milliGRAM(s) Oral daily  tamsulosin 0.4 milliGRAM(s) Oral at bedtime    MEDICATIONS  (PRN):  acetaminophen     Tablet .. 650 milliGRAM(s) Oral every 6 hours PRN Mild Pain (1 - 3)  atropine 1% Ophthalmic Solution for SubLingual Use 1 Drop(s) SubLingual every 8 hours PRN cough, and increased upper airway secretions  bisacodyl Suppository 10 milliGRAM(s) Rectal daily PRN Constipation  senna 2 Tablet(s) Oral at bedtime PRN Constipation

## 2024-11-19 NOTE — PROGRESS NOTE ADULT - SUBJECTIVE AND OBJECTIVE BOX
Patient is a 69y old  Male who presents with a chief complaint of L MCA infarct w/ hemorrhagic conversion (18 Nov 2024 14:24)    Patient seen and examined at bedside. No acute overnight events. Denies pain. +urinary retention. no BM yesterday     ALLERGIES:  penicillin (Swelling)    MEDICATIONS  (STANDING):  albuterol    90 MICROgram(s) HFA Inhaler 2 Puff(s) Inhalation every 6 hours  amLODIPine   Tablet 5 milliGRAM(s) Oral daily  aspirin 325 milliGRAM(s) Oral daily  cholecalciferol 1000 Unit(s) Oral daily  ciprofloxacin     Tablet 500 milliGRAM(s) Oral every 12 hours  clopidogrel Tablet 75 milliGRAM(s) Oral daily  colchicine 0.6 milliGRAM(s) Oral daily  enoxaparin Injectable 40 milliGRAM(s) SubCutaneous every 24 hours  losartan 50 milliGRAM(s) Oral daily  melatonin 9 milliGRAM(s) Oral at bedtime  pantoprazole    Tablet 40 milliGRAM(s) Oral before breakfast  PARoxetine 30 milliGRAM(s) Oral daily  polyethylene glycol 3350 17 Gram(s) Oral two times a day  rosuvastatin 5 milliGRAM(s) Oral daily  tamsulosin 0.4 milliGRAM(s) Oral at bedtime    MEDICATIONS  (PRN):  acetaminophen     Tablet .. 650 milliGRAM(s) Oral every 6 hours PRN Mild Pain (1 - 3)  atropine 1% Ophthalmic Solution for SubLingual Use 1 Drop(s) SubLingual every 8 hours PRN cough, and increased upper airway secretions  bisacodyl Suppository 10 milliGRAM(s) Rectal daily PRN Constipation  senna 2 Tablet(s) Oral at bedtime PRN Constipation    Vital Signs Last 24 Hrs  T(F): 97.7 (19 Nov 2024 07:46), Max: 98.1 (18 Nov 2024 19:35)  HR: 61 (19 Nov 2024 07:46) (60 - 62)  BP: 107/71 (19 Nov 2024 07:46) (107/71 - 121/69)  RR: 16 (19 Nov 2024 07:46) (16 - 16)  SpO2: 97% (19 Nov 2024 07:46) (97% - 97%)  I&O's Summary    18 Nov 2024 07:01  -  19 Nov 2024 07:00  --------------------------------------------------------  IN: 0 mL / OUT: 1100 mL / NET: -1100 mL    PHYSICAL EXAM:  General: NAD, awake, alert   ENT: MMM, no tonsilar exudate  Neck: Supple, No JVD  Lungs: Clear to auscultation bilaterally, no wheezes. Good air entry bilaterally   Cardio: RRR, S1/S2, No murmurs  Abdomen: Soft, Nontender, Nondistended; Bowel sounds present  Extremities: No calf tenderness, No pitting edema    LABS:                        12.0   7.00  )-----------( 309      ( 18 Nov 2024 05:57 )             36.2       11-18    143  |  108  |  21  ----------------------------<  101  3.8   |  30  |  0.94    Ca    8.7      18 Nov 2024 05:57    TPro  6.3  /  Alb  2.7  /  TBili  0.5  /  DBili  x   /  AST  32  /  ALT  35  /  AlkPhos  56  11-18     Urinalysis Basic - ( 18 Nov 2024 05:57 )    Color: x / Appearance: x / SG: x / pH: x  Gluc: 101 mg/dL / Ketone: x  / Bili: x / Urobili: x   Blood: x / Protein: x / Nitrite: x   Leuk Esterase: x / RBC: x / WBC x   Sq Epi: x / Non Sq Epi: x / Bacteria: x    Culture - Urine (collected 18 Nov 2024 10:20)  Source: Catheterized Catheterized  Preliminary Report (19 Nov 2024 10:20):    10,000 - 49,000 CFU/mL Gram positive organisms    COVID-19 PCR: NotDetec (11-13-24 @ 22:33)    RADIOLOGY & ADDITIONAL TESTS:     Care Discussed with Consultants/Other Providers: d/w Dr. Christopher

## 2024-11-20 LAB
-  AMPICILLIN: SIGNIFICANT CHANGE UP
-  CIPROFLOXACIN: SIGNIFICANT CHANGE UP
-  LEVOFLOXACIN: SIGNIFICANT CHANGE UP
-  NITROFURANTOIN: SIGNIFICANT CHANGE UP
-  TETRACYCLINE: SIGNIFICANT CHANGE UP
-  VANCOMYCIN: SIGNIFICANT CHANGE UP
CULTURE RESULTS: ABNORMAL
METHOD TYPE: SIGNIFICANT CHANGE UP
ORGANISM # SPEC MICROSCOPIC CNT: ABNORMAL
ORGANISM # SPEC MICROSCOPIC CNT: SIGNIFICANT CHANGE UP
SPECIMEN SOURCE: SIGNIFICANT CHANGE UP

## 2024-11-20 PROCEDURE — 99233 SBSQ HOSP IP/OBS HIGH 50: CPT

## 2024-11-20 PROCEDURE — 99232 SBSQ HOSP IP/OBS MODERATE 35: CPT

## 2024-11-20 RX ORDER — NITROFURANTOIN 100 MG/1
100 CAPSULE ORAL
Refills: 0 | Status: COMPLETED | OUTPATIENT
Start: 2024-11-20 | End: 2024-11-27

## 2024-11-20 RX ADMIN — Medication 2 PUFF(S): at 15:13

## 2024-11-20 RX ADMIN — NITROFURANTOIN 100 MILLIGRAM(S): 100 CAPSULE ORAL at 17:30

## 2024-11-20 RX ADMIN — TAMSULOSIN HYDROCHLORIDE 0.4 MILLIGRAM(S): 0.4 CAPSULE ORAL at 21:16

## 2024-11-20 RX ADMIN — PANTOPRAZOLE SODIUM 40 MILLIGRAM(S): 40 TABLET, DELAYED RELEASE ORAL at 05:12

## 2024-11-20 RX ADMIN — Medication 2 PUFF(S): at 08:26

## 2024-11-20 RX ADMIN — ENOXAPARIN SODIUM 40 MILLIGRAM(S): 30 INJECTION SUBCUTANEOUS at 17:30

## 2024-11-20 RX ADMIN — Medication 325 MILLIGRAM(S): at 12:05

## 2024-11-20 RX ADMIN — NITROFURANTOIN 100 MILLIGRAM(S): 100 CAPSULE ORAL at 12:56

## 2024-11-20 RX ADMIN — AMLODIPINE BESYLATE 5 MILLIGRAM(S): 10 TABLET ORAL at 08:10

## 2024-11-20 RX ADMIN — Medication 2 TABLET(S): at 21:15

## 2024-11-20 RX ADMIN — Medication 0.6 MILLIGRAM(S): at 12:05

## 2024-11-20 RX ADMIN — ROSUVASTATIN CALCIUM 5 MILLIGRAM(S): 5 TABLET, FILM COATED ORAL at 12:05

## 2024-11-20 RX ADMIN — PAROXETINE HYDROCHLORIDE HEMIHYDRATE 30 MILLIGRAM(S): 37.5 TABLET, FILM COATED, EXTENDED RELEASE ORAL at 12:05

## 2024-11-20 RX ADMIN — POLYETHYLENE GLYCOL 3350 17 GRAM(S): 17 POWDER, FOR SOLUTION ORAL at 17:30

## 2024-11-20 RX ADMIN — CLOPIDOGREL 75 MILLIGRAM(S): 75 TABLET, FILM COATED ORAL at 12:05

## 2024-11-20 RX ADMIN — POLYETHYLENE GLYCOL 3350 17 GRAM(S): 17 POWDER, FOR SOLUTION ORAL at 05:12

## 2024-11-20 RX ADMIN — Medication 1000 UNIT(S): at 12:05

## 2024-11-20 RX ADMIN — Medication 500 MILLIGRAM(S): at 05:13

## 2024-11-20 RX ADMIN — Medication 2 PUFF(S): at 21:52

## 2024-11-20 RX ADMIN — LOSARTAN POTASSIUM 50 MILLIGRAM(S): 100 TABLET, FILM COATED ORAL at 08:10

## 2024-11-20 NOTE — PROGRESS NOTE ADULT - SUBJECTIVE AND OBJECTIVE BOX
Patient is a 69y old  Male who presents with a chief complaint of L MCA infarct w/ hemorrhagic conversion (19 Nov 2024 13:43)    Patient seen and examined at bedside. No acute overnight events. Still requiring straight caths. BM yesterday and this am    ALLERGIES:  penicillin (Swelling)    MEDICATIONS  (STANDING):  albuterol    90 MICROgram(s) HFA Inhaler 2 Puff(s) Inhalation every 6 hours  amLODIPine   Tablet 5 milliGRAM(s) Oral daily  aspirin 325 milliGRAM(s) Oral daily  cholecalciferol 1000 Unit(s) Oral daily  clopidogrel Tablet 75 milliGRAM(s) Oral daily  colchicine 0.6 milliGRAM(s) Oral daily  enoxaparin Injectable 40 milliGRAM(s) SubCutaneous every 24 hours  losartan 50 milliGRAM(s) Oral daily  melatonin 9 milliGRAM(s) Oral at bedtime  nitrofurantoin monohydrate/macrocrystals (MACROBID) 100 milliGRAM(s) Oral two times a day  pantoprazole    Tablet 40 milliGRAM(s) Oral before breakfast  PARoxetine 30 milliGRAM(s) Oral daily  polyethylene glycol 3350 17 Gram(s) Oral two times a day  rosuvastatin 5 milliGRAM(s) Oral daily  senna 2 Tablet(s) Oral at bedtime  tamsulosin 0.4 milliGRAM(s) Oral at bedtime    MEDICATIONS  (PRN):  acetaminophen     Tablet .. 650 milliGRAM(s) Oral every 6 hours PRN Mild Pain (1 - 3)  atropine 1% Ophthalmic Solution for SubLingual Use 1 Drop(s) SubLingual every 8 hours PRN cough, and increased upper airway secretions  bisacodyl Suppository 10 milliGRAM(s) Rectal daily PRN Constipation    Vital Signs Last 24 Hrs  T(F): 97.5 (20 Nov 2024 08:07), Max: 97.9 (19 Nov 2024 20:52)  HR: 60 (20 Nov 2024 08:26) (51 - 60)  BP: 110/64 (20 Nov 2024 08:07) (110/64 - 118/68)  RR: 16 (20 Nov 2024 08:07) (16 - 16)  SpO2: 95% (20 Nov 2024 08:26) (95% - 97%)  I&O's Summary    19 Nov 2024 07:01  -  20 Nov 2024 07:00  --------------------------------------------------------  IN: 0 mL / OUT: 1220 mL / NET: -1220 mL    PHYSICAL EXAM:  General: NAD, awake, alert   ENT: MMM, no tonsilar exudate  Neck: Supple, No JVD  Lungs: Clear to auscultation bilaterally, no wheezes. Good air entry bilaterally   Cardio: RRR, S1/S2, No murmurs  Abdomen: Soft, Nontender, Nondistended; Bowel sounds present  Extremities: No calf tenderness, No pitting edema    LABS:                        12.0   7.00  )-----------( 309      ( 18 Nov 2024 05:57 )             36.2       11-18    143  |  108  |  21  ----------------------------<  101  3.8   |  30  |  0.94    Ca    8.7      18 Nov 2024 05:57    TPro  6.3  /  Alb  2.7  /  TBili  0.5  /  DBili  x   /  AST  32  /  ALT  35  /  AlkPhos  56  11-18     Urinalysis Basic - ( 18 Nov 2024 05:57 )    Color: x / Appearance: x / SG: x / pH: x  Gluc: 101 mg/dL / Ketone: x  / Bili: x / Urobili: x   Blood: x / Protein: x / Nitrite: x   Leuk Esterase: x / RBC: x / WBC x   Sq Epi: x / Non Sq Epi: x / Bacteria: x    Culture - Urine (collected 18 Nov 2024 10:20)  Source: Catheterized Catheterized  Final Report (20 Nov 2024 10:44):    10,000 - 49,000 CFU/mL Enterococcus faecalis  Organism: Enterococcus faecalis (20 Nov 2024 10:44)  Organism: Enterococcus faecalis (20 Nov 2024 10:44)      Method Type: ROB      -  Ampicillin: S <=2 Predicts results to ampicillin/sulbactam, amoxacillin-clavulanate and  piperacillin-tazobactam.      -  Ciprofloxacin: R >2      -  Levofloxacin: R >4      -  Nitrofurantoin: S <=32 Should not be used to treat pyelonephritis.      -  Tetracycline: R >8      -  Vancomycin: S 1      COVID-19 PCR: NotDetec (11-13-24 @ 22:33)      RADIOLOGY & ADDITIONAL TESTS:     Care Discussed with Consultants/Other Providers:

## 2024-11-20 NOTE — PROGRESS NOTE ADULT - SUBJECTIVE AND OBJECTIVE BOX
HPI:  68 y/o M w/ PMHx HLD, depression, GERD, gout, colonic polyps,  congenital absence of one kidney, recent cholecystitis s/p cholecystectomy (6/24), RHETT on CPAP orginially presented to [facility] on 11/3 with aphasia, R sided weakness and R facial droop and vigorous cough x 2 weeks s/p COVID vaccine. LKW 2045 on 11/2. 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 Patient transferred to [facility] for mechanical thrombectomy. S/p L ICA terminus occulusion a/p L ICA thrombectomy, no stent, 5 passes TICI 2C  w/ underling L petrous ICA dissection on 11/3 w/ Dr. March. Post op course c/b slight pseudoaneurysm enlargement w/ dissection distal embolisim into the terminal ICA seen on 11/4 CTA and hemorrhagic transformation of L MCA infarct.   Of note, hospital course also significant for fevers, suspect aspiration pneumonitis treated with Rocephin x 1, and dysphagia requiring easy to chew,  with mod thick liquids. EP was consulted with concerns of cardioembolic source for infarct, recommended to f/u outpatient for Zio patch.   Patient had an MBS on 11/12 which showed moderate oropharyngeal dysphagia, remarkable for delayed/reduced laryngeal vestibular closure, pharyngeal trigger delay and reduced hyolaryngeal elevation/excursion.  There was deep laryngeal penetration and possibly aspiration of thin liquid.  Patient was cleared for easy to chew GI diet with mildly thick liquids.    Patient optimized and was evaluated by PM&R and therapy for functional deficits, gait/ADL impairments and acute rehabilitation was recommended. Patient was cleared for discharge to [facility] IRU on 11/13/24.  (13 Nov 2024 15:44)          Subjective:  Mr Jamil admits to a small amount of shortness of breath at night though he has been satting well throughout the day today and early in the morning on 2L of O2.  He nods his head yes that he did have a BM.  He also nods yes that he urinated independently today, though on nursing report it was noted that in fact he did not urinate on his own - he required straight cath and had 720cc of urine removed today in the morning.  No further concerns at this time.      VITALS  Vital Signs Last 24 Hrs  T(C): 36.4 (20 Nov 2024 08:07), Max: 36.6 (19 Nov 2024 20:52)  T(F): 97.5 (20 Nov 2024 08:07), Max: 97.9 (19 Nov 2024 20:52)  HR: 68 (20 Nov 2024 15:14) (56 - 68)  BP: 110/64 (20 Nov 2024 08:07) (110/64 - 118/68)  BP(mean): --  RR: 16 (20 Nov 2024 08:07) (16 - 16)  SpO2: 94% (20 Nov 2024 15:14) (94% - 96%)    Parameters below as of 20 Nov 2024 15:14  Patient On (Oxygen Delivery Method): nasal cannula        REVIEW OF SYMPTOMS  Neurological deficits      PHYSICAL EXAM:   Constitutional - NAD, Comfortable   HEENT - NCAT, EOMI   Neck - Supple, No limited ROM  Chest - good chest expansion, good respiratory effort, CTAB    Cardio - warm and well perfused, RRR, no murmur   Abdomen -  Soft, NTND   Extremities - No peripheral edema, No calf tenderness      Neurologic Exam:                       Cognitive -              Orientation: Awake, Alert, Orientation limited due to aphasia            Attention:  limited due to aphasia; can follow simple 1 step commands; 2 step commands with delayed processing            Memory: unable to test due to aphasia     Speech – + Aphasia--Nonfluent, Impaired multiple step command following, but able to follow all simple commands, impaired repetition; + dysarthria,  able to phonate "aah" but did not phonate or verbalize any words.       Cranial Nerves – PERRLA , EOMI, + left facial weakness; facial sensation impaired on right, + dysarthria, + dysphagia, Shoulder shrug impaired        Motor -                        LEFT    UE - ShAB 5/5, EF 5/5, EE 5/5, WE 5/5,  5/5                     RIGHT UE - ShAB 0/5, EF 0/5, EE 0/5, WE 0/5,  0/5                     LEFT    LE - HF 5/5, KE 5/5, DF 5/5, PF 5/5                     RIGHT LE - HF 0/5, KE 0/5, DF 0/5, PF 0/5        Coordination - FTN/HTS intact on the left; unable to assess due to weaknes son the right     Sensory – Impaired to LT  on right side;-- withdraws to pain Left LE but no response left UE     Reflexes - DTR  2/ 4 , neg Soria's b/l,  Tone: no increased tone    MSK: right shoulder PROM WNL, +sublux  Psychiatric - Mood stable, Affect WNL   Skin on admission: intact        RECENT LABS                  RADIOLOGY/OTHER RESULTS      MEDICATIONS  (STANDING):  albuterol    90 MICROgram(s) HFA Inhaler 2 Puff(s) Inhalation every 6 hours  amLODIPine   Tablet 5 milliGRAM(s) Oral daily  aspirin 325 milliGRAM(s) Oral daily  cholecalciferol 1000 Unit(s) Oral daily  clopidogrel Tablet 75 milliGRAM(s) Oral daily  colchicine 0.6 milliGRAM(s) Oral daily  enoxaparin Injectable 40 milliGRAM(s) SubCutaneous every 24 hours  losartan 50 milliGRAM(s) Oral daily  melatonin 9 milliGRAM(s) Oral at bedtime  nitrofurantoin monohydrate/macrocrystals (MACROBID) 100 milliGRAM(s) Oral two times a day  pantoprazole    Tablet 40 milliGRAM(s) Oral before breakfast  PARoxetine 30 milliGRAM(s) Oral daily  polyethylene glycol 3350 17 Gram(s) Oral two times a day  rosuvastatin 5 milliGRAM(s) Oral daily  senna 2 Tablet(s) Oral at bedtime  tamsulosin 0.4 milliGRAM(s) Oral at bedtime    MEDICATIONS  (PRN):  acetaminophen     Tablet .. 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  S/p L ICA terminus occulusion a/p L ICA thrombectomy,  w/ underling L petrous ICA dissection     # L MCA stroke w/ hemorrhagic conversion  # L petrous ICA dissection w/ pesudoaneurysm  - [facility] 11/9: evolving acute to subacute left MCA infarct with evolving left basal ganglia hemorrhagic transformation, new punctuate foci of increased hyperdensity. Stable mass effect and 5mm rightward midline shift  - Aspirin 325mg daily  - Plavix 75mg daily   - Rosuvastatin 5mg QD  - outpatient cardiology f/u for w/u r/o cardioembolic source  - continue comprehensive rehab program, 3 hours a day, 5 days a week.    # R Soleal Vein DVT  - Duplex U/S showed DVT below the knee, R soleal vein thrombosis 11/14  - Lovenox 40 mg SubQ daily 11/15  - f/u weekly dopplers, ordered for 11/21    #Urinary Retention, unimproved  #Constipation, resolving  -continue bladder scans  -UA without gross contamination, but per hospitalist will start cipro 11/18 empirically and send urine culture.    -KUB 11/17: Nonobstructed bowel gas pattern. Enteric contrast throughout the colon with moderately large stool burden.  -LBM recorded 11/17  -KUB ordered 11/18, follow results  -flomax 0.4 initiated daily 11/18  -required straight cath today, 720 CC removed in the morning, consider placing jerome catheter  -CTM constipation, dulcolax suppository PRN ordered.     UTI--  -- u/a +  -- on cipro, discontinued 11/20  - 11/20 started macrobid due to sensitivities  -- urine Cx +Enteroccocus  --may likely be contributing to  retention  -- Provider to RN order written 11/20 for PVRs to be collected if any spontaneous voiding occurs.    # Normocytic anemia (stable) vs Iron deficiency anemia   - F/u outpatient colonoscopy for hx of colon polps  - Transfuse <7 PRN    - Iron studies: Transferrin: 11, TIBC: 170, Iron: 18, Ferritin: 414  - monitor H/H. Hgb 12.0 11/18 stable  - CBC 11/21    # RHETT  # Activity-Induced Hypoxia  - CXR- negative   - CPAP HS 5 50% O2. Poor compliance  - O2 via NC PRN  - O2 up to 3L NC as needed during therapy, per hospitalist, taper to 1L O2, with O2 Sat >90%, CTM  - Albuterol PRN    # Gout  - Colchicine 0.6mg daily  - Uric acid 11/12- 6.8     # Mood/Cognition  # Depression  - Paxil 30mg daily     # Sleep:   - Increase melatonin 9 qhs 11/16. Improved sleep per patient    # Pain Management:  - Tylenol PRN    # GI/Bowel:  - Senna QHS  - Miralax BIDD  - Bisacodyl suppository QD PRN  - GI ppx: Pantoprazole 40mg daily   - constipation with abdominal discomfort: will give dulcolax suppository. If persists, AXR  - per SLP, will initiate free water protocol   - continue with aggressive bowel regimen to assist with urinary retention    # Dysphagia:  - S/p MBS on 11/12--> diet: Easy to chew; mild thick liquids- DASH  - 1:1 feeds    # LABS  CBC CMP 11/21   monitor BM    Letter for cruise ship cancellation due to patient's injury.     IDT 11/19  Omero  RN - incontinent of bowel, requiring SC  SW - condo with wife, 3-4 arely. elevator to 7th floor.  Independent prior, driving and working.  Wife is retired, main support and she drives.  SLP - easy to chew with mildly thick liquids.  Severe apraxia, aphasia, dysarthria. He can approximate single words with multiple cues.  He has picture board for AAC.  On free water protocol.  Repeat MBS end of this week or beginning of next week.  Suspect cog deficits but difficult to determine due to apraxia and dysarthria, and aphasia.  OT - supervision with eating, jose l for oral hygiene, maxA for bathing, UBD, and totA for toileting, LBD, commode transfer.  No shower transfer yet.  Today, mod2 for squat pivot, no active movement UE.  Therapy requesting max time.  Goals are jose l at WC level.  PT - bed mob maxA; transfer totA; non ambulatory.  Goal is Jose L  Goal 1 - use multimodalities to communicate basic wants and needs with staff max cues.     Goal 2 - transfer OOB with minimal assist  barriers - communication, hemiplegia, sensory impairment, aphasia, poor endurance.    admit GC 11/13.    CMG is 12/5.  DC 12/5 to TREV Cobian called Ms Elaine Jamil 11/19 and spoke with her to update her on Michael's medical status, as well as his functional status and planned discharge date and disposition plan.            ---------------  Code Status: FULL  Emergency Contact:    Outpatient Follow-up (Specialty/Name of physician):  Dr. Cheng March  Neurology  12/24/24 appt     Cardiology    ENT  [phone]           Nutritional Assessment:  · Nutritional Assessment	1. Continue Easy to Chew, Mildly Thickened Liquid diet.   2. Recommend Ensure Plus High Protein Daily 8 oz (Provides 350 kcal, 20 grams of protein) 1x/day to assist pt in meeting estimated protein energy needs.  3. Patient requires assist with feeds; Restorative Dining Room    4. Monitor PO intake, GI tolerance, skin integrity, labs, weight, and bowel movement regularity.   5. Honor food preferences as feasible. Assist with meals PRN and encourage PO intake.  6. Follow SLP recommendations  7. Provide ongoing diet education as needed  8. RD remains available upon request and will follow-up per protocol   HPI:  70 y/o M w/ PMHx HLD, depression, GERD, gout, colonic polyps,  congenital absence of one kidney, recent cholecystitis s/p cholecystectomy (6/24), RHETT on CPAP orginially presented to [facility] on 11/3 with aphasia, R sided weakness and R facial droop and vigorous cough x 2 weeks s/p COVID vaccine. LKW 2045 on 11/2. 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 Patient transferred to [facility] for mechanical thrombectomy. S/p L ICA terminus occulusion a/p L ICA thrombectomy, no stent, 5 passes TICI 2C  w/ underling L petrous ICA dissection on 11/3 w/ Dr. March. Post op course c/b slight pseudoaneurysm enlargement w/ dissection distal embolisim into the terminal ICA seen on 11/4 CTA and hemorrhagic transformation of L MCA infarct.   Of note, hospital course also significant for fevers, suspect aspiration pneumonitis treated with Rocephin x 1, and dysphagia requiring easy to chew,  with mod thick liquids. EP was consulted with concerns of cardioembolic source for infarct, recommended to f/u outpatient for Zio patch.   Patient had an MBS on 11/12 which showed moderate oropharyngeal dysphagia, remarkable for delayed/reduced laryngeal vestibular closure, pharyngeal trigger delay and reduced hyolaryngeal elevation/excursion.  There was deep laryngeal penetration and possibly aspiration of thin liquid.  Patient was cleared for easy to chew GI diet with mildly thick liquids.    Patient optimized and was evaluated by PM&R and therapy for functional deficits, gait/ADL impairments and acute rehabilitation was recommended. Patient was cleared for discharge to [facility] IRU on 11/13/24.  (13 Nov 2024 15:44)          Subjective:  Mr Jamil admits to a small amount of shortness of breath at night though he has been satting well throughout the day today and early in the morning on 2L of O2. He uses O2 2L at night for comfort.  Not tolerating CPAP.  He nods his head yes that he did have a BM.  He also nods yes that he urinated independently today, though on nursing report it was noted that in fact he did not urinate on his own - he required straight cath and had 720cc of urine removed today in the morning.  No further concerns at this time.      VITALS  Vital Signs Last 24 Hrs  T(C): 36.4 (20 Nov 2024 08:07), Max: 36.6 (19 Nov 2024 20:52)  T(F): 97.5 (20 Nov 2024 08:07), Max: 97.9 (19 Nov 2024 20:52)  HR: 68 (20 Nov 2024 15:14) (56 - 68)  BP: 110/64 (20 Nov 2024 08:07) (110/64 - 118/68)  BP(mean): --  RR: 16 (20 Nov 2024 08:07) (16 - 16)  SpO2: 94% (20 Nov 2024 15:14) (94% - 96%)    Parameters below as of 20 Nov 2024 15:14  Patient On (Oxygen Delivery Method): nasal cannula        REVIEW OF SYMPTOMS  Neurological deficits      PHYSICAL EXAM:   Constitutional - NAD, Comfortable   HEENT - NCAT, EOMI   Neck - Supple, No limited ROM  Chest - good chest expansion, good respiratory effort, CTAB    Cardio - warm and well perfused, RRR, no murmur   Abdomen -  Soft, NTND   Extremities - No peripheral edema, No calf tenderness      Neurologic Exam:                       Cognitive -              Orientation: Awake, Alert, Orientation limited due to aphasia            Attention:  limited due to aphasia; can follow simple 1 step commands; 2 step commands with delayed processing            Memory: unable to test due to aphasia     Speech – + Aphasia--Nonfluent, Impaired multiple step command following, but able to follow all simple commands, impaired repetition; + dysarthria,  able to phonate "aah" but did not phonate or verbalize any words.       Cranial Nerves – PERRLA , EOMI, + left facial weakness; facial sensation impaired on right, + dysarthria, + dysphagia, Shoulder shrug impaired        Motor -                        LEFT    UE - ShAB 5/5, EF 5/5, EE 5/5, WE 5/5,  5/5                     RIGHT UE - ShAB 0/5, EF 0/5, EE 0/5, WE 0/5,  0/5                     LEFT    LE - HF 5/5, KE 5/5, DF 5/5, PF 5/5                     RIGHT LE - HF 0/5, KE 0/5, DF 0/5, PF 0/5        Coordination - FTN/HTS intact on the left; unable to assess due to weaknes son the right     Sensory – Impaired to LT  on right side;-- withdraws to pain Left LE but no response left UE     Reflexes - DTR  2/ 4 , neg Soria's b/l,  Tone: no increased tone    MSK: right shoulder PROM WNL, +sublux  Psychiatric - Mood stable, Affect WNL   Skin on admission: intact        RECENT LABS                  RADIOLOGY/OTHER RESULTS      MEDICATIONS  (STANDING):  albuterol    90 MICROgram(s) HFA Inhaler 2 Puff(s) Inhalation every 6 hours  amLODIPine   Tablet 5 milliGRAM(s) Oral daily  aspirin 325 milliGRAM(s) Oral daily  cholecalciferol 1000 Unit(s) Oral daily  clopidogrel Tablet 75 milliGRAM(s) Oral daily  colchicine 0.6 milliGRAM(s) Oral daily  enoxaparin Injectable 40 milliGRAM(s) SubCutaneous every 24 hours  losartan 50 milliGRAM(s) Oral daily  melatonin 9 milliGRAM(s) Oral at bedtime  nitrofurantoin monohydrate/macrocrystals (MACROBID) 100 milliGRAM(s) Oral two times a day  pantoprazole    Tablet 40 milliGRAM(s) Oral before breakfast  PARoxetine 30 milliGRAM(s) Oral daily  polyethylene glycol 3350 17 Gram(s) Oral two times a day  rosuvastatin 5 milliGRAM(s) Oral daily  senna 2 Tablet(s) Oral at bedtime  tamsulosin 0.4 milliGRAM(s) Oral at bedtime    MEDICATIONS  (PRN):  acetaminophen     Tablet .. 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  S/p L ICA terminus occulusion a/p L ICA thrombectomy,  w/ underling L petrous ICA dissection     # L MCA stroke w/ hemorrhagic conversion  # L petrous ICA dissection w/ pesudoaneurysm  - [facility] 11/9: evolving acute to subacute left MCA infarct with evolving left basal ganglia hemorrhagic transformation, new punctuate foci of increased hyperdensity. Stable mass effect and 5mm rightward midline shift  - Aspirin 325mg daily  - Plavix 75mg daily   - Rosuvastatin 5mg QD  - outpatient cardiology f/u for w/u r/o cardioembolic source  - continue comprehensive rehab program, 3 hours a day, 5 days a week.    # R Soleal Vein DVT  - Duplex U/S showed DVT below the knee, R soleal vein thrombosis 11/14  - Lovenox 40 mg SubQ daily 11/15  - f/u weekly dopplers, ordered for 11/21    #Urinary Retention, unimproved  #Constipation, resolving  -continue bladder scans  -UA without gross contamination, but per hospitalist will start cipro 11/18 empirically and send urine culture.    -KUB 11/17: Nonobstructed bowel gas pattern. Enteric contrast throughout the colon with moderately large stool burden.  -LBM recorded 11/17  -KUB ordered 11/18, follow results  -flomax 0.4 initiated daily 11/18  -required straight cath today, 720 CC removed in the morning, consider placing jerome catheter  -CTM constipation, dulcolax suppository PRN ordered.     UTI--  -- u/a +  -- on cipro, discontinued 11/20  - 11/20 started macrobid due to sensitivities  -- urine Cx +Enteroccocus  --may likely be contributing to  retention  -- Provider to RN order written 11/20 for PVRs to be collected if any spontaneous voiding occurs.    # Normocytic anemia (stable) vs Iron deficiency anemia   - F/u outpatient colonoscopy for hx of colon polps  - Transfuse <7 PRN    - Iron studies: Transferrin: 11, TIBC: 170, Iron: 18, Ferritin: 414  - monitor H/H. Hgb 12.0 11/18 stable  - CBC 11/21    # RHETT  # Activity-Induced Hypoxia  - CXR- negative   - CPAP HS 5 50% O2. Poor compliance  - O2 via NC PRN  - O2 up to 3L NC as needed during therapy, per hospitalist, taper to 1L O2, with O2 Sat >90%, CTM  - Albuterol PRN    # Gout  - Colchicine 0.6mg daily  - Uric acid 11/12- 6.8     # Mood/Cognition  # Depression  - Paxil 30mg daily     # Sleep:   - Increase melatonin 9 qhs 11/16. Improved sleep per patient    # Pain Management:  - Tylenol PRN    # GI/Bowel:  - Senna QHS  - Miralax BIDD  - Bisacodyl suppository QD PRN  - GI ppx: Pantoprazole 40mg daily   - constipation with abdominal discomfort: will give dulcolax suppository. If persists, AXR  - per SLP, will initiate free water protocol   - continue with aggressive bowel regimen to assist with urinary retention    # Dysphagia:  - S/p MBS on 11/12--> diet: Easy to chew; mild thick liquids- DASH  - 1:1 feeds    # LABS  CBC CMP 11/21   monitor BM    Letter for cruise ship cancellation due to patient's injury.     IDT 11/19  Omero  RN - incontinent of bowel, requiring SC  SW - condo with wife, 3-4 arely. elevator to 7th floor.  Independent prior, driving and working.  Wife is retired, main support and she drives.  SLP - easy to chew with mildly thick liquids.  Severe apraxia, aphasia, dysarthria. He can approximate single words with multiple cues.  He has picture board for AAC.  On free water protocol.  Repeat MBS end of this week or beginning of next week.  Suspect cog deficits but difficult to determine due to apraxia and dysarthria, and aphasia.  OT - supervision with eating, jose l for oral hygiene, maxA for bathing, UBD, and totA for toileting, LBD, commode transfer.  No shower transfer yet.  Today, mod2 for squat pivot, no active movement UE.  Therapy requesting max time.  Goals are josel  at WC level.  PT - bed mob maxA; transfer totA; non ambulatory.  Goal is Jose L  Goal 1 - use multimodalities to communicate basic wants and needs with staff max cues.     Goal 2 - transfer OOB with minimal assist  barriers - communication, hemiplegia, sensory impairment, aphasia, poor endurance.    admit GC 11/13.    CMG is 12/5.  DC 12/5 to TRE[facility] Cobian called Ms Elaine Jamil 11/19 and spoke with her to update her on Michael's medical status, as well as his functional status and planned discharge date and disposition plan.            ---------------  Code Status: FULL  Emergency Contact:    Outpatient Follow-up (Specialty/Name of physician):  Dr. Cheng March  Neurology  12/24/24 appt     Cardiology    ENT  [phone]           Nutritional Assessment:  · Nutritional Assessment	1. Continue Easy to Chew, Mildly Thickened Liquid diet.   2. Recommend Ensure Plus High Protein Daily 8 oz (Provides 350 kcal, 20 grams of protein) 1x/day to assist pt in meeting estimated protein energy needs.  3. Patient requires assist with feeds; Restorative Dining Room    4. Monitor PO intake, GI tolerance, skin integrity, labs, weight, and bowel movement regularity.   5. Honor food preferences as feasible. Assist with meals PRN and encourage PO intake.  6. Follow SLP recommendations  7. Provide ongoing diet education as needed  8. RD remains available upon request and will follow-up per protocol   HPI:  68 y/o M w/ PMHx HLD, depression, GERD, gout, colonic polyps,  congenital absence of one kidney, recent cholecystitis s/p cholecystectomy (6/24), RHETT on CPAP orginially presented to [facility] on 11/3 with aphasia, R sided weakness and R facial droop and vigorous cough x 2 weeks s/p COVID vaccine. LKW 2045 on 11/2. 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 Patient transferred to [facility] for mechanical thrombectomy. S/p L ICA terminus occulusion a/p L ICA thrombectomy, no stent, 5 passes TICI 2C  w/ underling L petrous ICA dissection on 11/3 w/ Dr. March. Post op course c/b slight pseudoaneurysm enlargement w/ dissection distal embolisim into the terminal ICA seen on 11/4 CTA and hemorrhagic transformation of L MCA infarct.   Of note, hospital course also significant for fevers, suspect aspiration pneumonitis treated with Rocephin x 1, and dysphagia requiring easy to chew,  with mod thick liquids. EP was consulted with concerns of cardioembolic source for infarct, recommended to f/u outpatient for Zio patch.   Patient had an MBS on 11/12 which showed moderate oropharyngeal dysphagia, remarkable for delayed/reduced laryngeal vestibular closure, pharyngeal trigger delay and reduced hyolaryngeal elevation/excursion.  There was deep laryngeal penetration and possibly aspiration of thin liquid.  Patient was cleared for easy to chew GI diet with mildly thick liquids.    Patient optimized and was evaluated by PM&R and therapy for functional deficits, gait/ADL impairments and acute rehabilitation was recommended. Patient was cleared for discharge to [facility] IRU on 11/13/24.  (13 Nov 2024 15:44)          Subjective:  Mr Jamil admits to a small amount of shortness of breath at night though he has been satting well throughout the day today and early in the morning on 2L of O2. He uses O2 2L at night for comfort.  Not tolerating CPAP.  He nods his head yes that he did have a BM.  He also nods yes that he urinated independently today, though on nursing report it was noted that in fact he did not urinate on his own - he required straight cath and had 720cc of urine removed today in the morning.  No further concerns at this time.      VITALS  Vital Signs Last 24 Hrs  T(C): 36.4 (20 Nov 2024 08:07), Max: 36.6 (19 Nov 2024 20:52)  T(F): 97.5 (20 Nov 2024 08:07), Max: 97.9 (19 Nov 2024 20:52)  HR: 68 (20 Nov 2024 15:14) (56 - 68)  BP: 110/64 (20 Nov 2024 08:07) (110/64 - 118/68)  BP(mean): --  RR: 16 (20 Nov 2024 08:07) (16 - 16)  SpO2: 94% (20 Nov 2024 15:14) (94% - 96%)    Parameters below as of 20 Nov 2024 15:14  Patient On (Oxygen Delivery Method): nasal cannula        REVIEW OF SYMPTOMS  Neurological deficits      PHYSICAL EXAM:   Constitutional - NAD, Comfortable   HEENT - NCAT, EOMI   Neck - Supple, No limited ROM  Chest - good chest expansion, good respiratory effort, CTAB    Cardio - warm and well perfused, RRR, no murmur   Abdomen -  Soft, NTND   Extremities - No peripheral edema, No calf tenderness      Neurologic Exam:                       Cognitive -              Orientation: Awake, Alert, Orientation limited due to aphasia            Attention:  limited due to aphasia; can follow simple 1 step commands; 2 step commands with delayed processing            Memory: unable to test due to aphasia     Speech – + Aphasia--Nonfluent, Impaired multiple step command following, but able to follow all simple commands, impaired repetition; + dysarthria,  able to phonate "aah" but did not phonate or verbalize any words.       Cranial Nerves – PERRLA , EOMI, + left facial weakness; facial sensation impaired on right, + dysarthria, + dysphagia, Shoulder shrug impaired        Motor -                        LEFT    UE - ShAB 5/5, EF 5/5, EE 5/5, WE 5/5,  5/5                     RIGHT UE - ShAB 0/5, EF 0/5, EE 0/5, WE 0/5,  0/5                     LEFT    LE - HF 5/5, KE 5/5, DF 5/5, PF 5/5                     RIGHT LE - HF 0/5, KE 0/5, DF 0/5, PF 0/5        Coordination - FTN/HTS intact on the left; unable to assess due to weaknes son the right     Sensory – Impaired to LT  on right side;-- withdraws to pain Left LE but no response left UE     Reflexes - DTR  2/ 4 , neg Soria's b/l,  Tone: no increased tone    MSK: right shoulder PROM WNL, +sublux  Psychiatric - Mood stable, Affect WNL   Skin on admission: intact        RECENT LABS                  RADIOLOGY/OTHER RESULTS      MEDICATIONS  (STANDING):  albuterol    90 MICROgram(s) HFA Inhaler 2 Puff(s) Inhalation every 6 hours  amLODIPine   Tablet 5 milliGRAM(s) Oral daily  aspirin 325 milliGRAM(s) Oral daily  cholecalciferol 1000 Unit(s) Oral daily  clopidogrel Tablet 75 milliGRAM(s) Oral daily  colchicine 0.6 milliGRAM(s) Oral daily  enoxaparin Injectable 40 milliGRAM(s) SubCutaneous every 24 hours  losartan 50 milliGRAM(s) Oral daily  melatonin 9 milliGRAM(s) Oral at bedtime  nitrofurantoin monohydrate/macrocrystals (MACROBID) 100 milliGRAM(s) Oral two times a day  pantoprazole    Tablet 40 milliGRAM(s) Oral before breakfast  PARoxetine 30 milliGRAM(s) Oral daily  polyethylene glycol 3350 17 Gram(s) Oral two times a day  rosuvastatin 5 milliGRAM(s) Oral daily  senna 2 Tablet(s) Oral at bedtime  tamsulosin 0.4 milliGRAM(s) Oral at bedtime    MEDICATIONS  (PRN):  acetaminophen     Tablet .. 650 milliGRAM(s) Oral every 6 hours PRN Mild Pain (1 - 3)  atropine 1% Ophthalmic Solution for SubLingual Use 1 Drop(s) SubLingual every 8 hours PRN cough, and increased upper airway secretions  bisacodyl Suppository 10 milliGRAM(s) Rectal daily PRN Constipation          Assessment and Plan:   · Assessment	  68 y/o M w/ PMHx HLD, depression, GERD, gout, colonic polyps,  congenital absence of one kidney, recent cholecystitis s/p cholecystectomy (6/24), RHETT on CPAP orginially presented [facility] on [facility]/3/24 with 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  S/p L ICA terminus occulusion a/p L ICA thrombectomy,  w/ underling L petrous ICA dissection     # L MCA stroke w/ hemorrhagic conversion  # L petrous ICA dissection w/ pesudoaneurysm  - CTH 11/9: evolving acute to subacute left MCA infarct with evolving left basal ganglia hemorrhagic transformation, new punctuate foci of increased hyperdensity. Stable mass effect and 5mm rightward midline shift  - Aspirin 325mg daily  - Plavix 75mg daily   - Rosuvastatin 5mg QD  - outpatient cardiology f/u for w/u r/o cardioembolic source  - continue comprehensive rehab program, 3 hours a day, 5 days a week.    # R Soleal Vein DVT  - Duplex U/S showed DVT below the knee, R soleal vein thrombosis 11/14  - Lovenox 40 mg SubQ daily 11/15  - f/u weekly dopplers, ordered for 11/21    #Urinary Retention, unimproved  #Constipation, resolving  -continue bladder scans  -UA without gross contamination, but per hospitalist will start cipro 11/18 empirically and send urine culture.    -KUB 11/17: Nonobstructed bowel gas pattern. Enteric contrast throughout the colon with moderately large stool burden.  -LBM recorded 11/17  -KUB ordered 11/18, follow results  -flomax 0.4 initiated daily 11/18  -required straight cath today, 720 CC removed in the morning,   --Will cont. to monitor--  consider placing jerome catheter  -CTM constipation, dulcolax suppository PRN ordered.     UTI--  -- u/a +  -- on cipro, discontinued 11/20  - 11/20 started macrobid due to sensitivities  -- urine Cx +Enteroccocus  --may likely be contributing to  retention  -- Provider to RN order written 11/20 for PVRs to be collected if any spontaneous voiding occurs.    # Normocytic anemia (stable) vs Iron deficiency anemia   - F/u outpatient colonoscopy for hx of colon polps  - Transfuse <7 PRN    - Iron studies: Transferrin: 11, TIBC: 170, Iron: 18, Ferritin: 414  - monitor H/H. Hgb 12.0 11/18 stable  - CBC 11/21    # RHETT  # Activity-Induced Hypoxia  - CXR- negative   - CPAP HS 5 50% O2. Poor compliance  - O2 via NC PRN  - O2 up to 3L NC as needed during therapy, per hospitalist, taper to 1L O2, with O2 Sat >90%, CTM  - Albuterol PRN    # Gout  - Colchicine 0.6mg daily  - Uric acid 11/12- 6.8     # Mood/Cognition  # Depression  - Paxil 30mg daily     # Sleep:   -cont. melatonin 9 qhs     # Pain Management:  - Tylenol PRN    # GI/Bowel:  - Senna QHS  - Miralax BIDD  - Bisacodyl suppository QD PRN  - GI ppx: Pantoprazole 40mg daily   - constipation with abdominal discomfort: will give dulcolax suppository. If persists, AXR  - per SLP, will initiate free water protocol   - continue with aggressive bowel regimen to assist with urinary retention    # Dysphagia:  - S/p MBS on 11/12--> diet: Easy to chew; mild thick liquids- DASH  - 1:1 feeds    # LABS  CBC CMP 11/21   monitor BM    Letter for cruise ship cancellation due to patient's injury.     IDT 11/19  [place]mero  RN - incontinent of bowel, requiring SC  SW - condo with wife, 3-4 arely. elevator to 7th floor.  Independent prior, driving and working.  Wife is retired, main support and she drives.  SLP - easy to chew with mildly thick liquids.  Severe apraxia, aphasia, dysarthria. He can approximate single words with multiple cues.  He has picture board for AAC.  On free water protocol.  Repeat MBS end of this week or beginning of next week.  Suspect cog deficits but difficult to determine due to apraxia and dysarthria, and aphasia.  OT - supervision with eating, jose l for oral hygiene, maxA for bathing, UBD, and totA for toileting, LBD, commode transfer.  No shower transfer yet.  Today, mod2 for squat pivot, no active movement UE.  Therapy requesting max time.  Goals are jose l at WC level.  PT - bed mob maxA; transfer totA; non ambulatory.  Goal is Jose L  Goal 1 - use multimodalities to communicate basic wants and needs with staff max cues.     Goal 2 - transfer OOB with minimal assist  barriers - communication, hemiplegia, sensory impairment, aphasia, poor endurance.    admit GC 11/13.    CMG is 12/5.  DC 12/5 to [facility] Cobian called Ms Elaine Jamil 11/19 and spoke with her to update her on Michael's medical status, as well as his functional status and planned discharge date and disposition plan.            ---------------  Code Status: FULL  Emergency Contact:    Outpatient Follow-up (Specialty/Name of physician):  Dr. Cheng March  Neurology  12/24/24 appt     Cardiology    ENT  [phone]           Nutritional Assessment:  · Nutritional Assessment	1. Continue Easy to Chew, Mildly Thickened Liquid diet.   2. Recommend Ensure Plus High Protein Daily 8 oz (Provides 350 kcal, 20 grams of protein) 1x/day to assist pt in meeting estimated protein energy needs.  3. Patient requires assist with feeds; Restorative Dining Room    4. Monitor PO intake, GI tolerance, skin integrity, labs, weight, and bowel movement regularity.   5. Honor food preferences as feasible. Assist with meals PRN and encourage PO intake.  6. Follow SLP recommendations  7. Provide ongoing diet education as needed  8. RD remains available upon request and will follow-up per protocol

## 2024-11-20 NOTE — PROGRESS NOTE ADULT - ASSESSMENT
TARUN CRESPO is a 70 y/o M w/ PMHx HLD, depression, GERD, gout, colonic polyps,  congenital absence of one kidney, recent cholecystitis s/p cholecystectomy (6/24), RHETT on CPAP orginially presented to [facility] on 11/3 with aphasia, R sided weakness and R facial droop and vigorous cough x 2 weeks s/p COVID vaccine. 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  S/p L ICA terminus occulusion a/p L ICA thrombectomy, no stent, 5 passes TICI 2C  w/ underling L petrous ICA dissection on 11/3 w/ Dr. March. Now admitted to [facility] for functional deficits for initiation of a multidisciplinary rehab program     #L MCA stroke w/ hemorrhagic conversion  #L petrous ICA dissection w/ pesudoaneurysm  -CTH 11/3: hyperdense appearance of L basal ganglia and L frontal lobe gyri, mild LCA edema 2/2 recent ischemia  -CTA H/N 11/3: no evidence of significant stenosis affecting the extra cranial carotid or vertebral arteries. No evidence of aneurysm or significant vascular stenosis at [place] of [place]  -Repeat CTH 11/4: post recent endovascular revascularization of the left internal carotid artery and left middle cerebral artery. Minimally less conspicuous hyperdense appearance of left basal ganglia and left frontal lobe gyri which may represent expected evolution of contrast staining  -MRI brain 11/5: evolving recent infarct in L ICA associated with hemorrhagic transformation of the L basal ganglia. Effacement of cerebral sulci with mass effect causing 0.6 mm rightward shift.   -CTH 11/9: evolving acute to subacute left MCA infarct with evolving left basal ganglia hemorrhagic transformation, new punctuate foci of increased hyperdensity. Stable mass effect and 5mm rightward midline shift  -CTA 11/9: stable left internal carotid artery pseudoaneurysm immediately inferior to the petrous portion of the L ICA at the area of focal dissection.  -S/p L ICA terminus occulusion a/p L ICA thrombectomy, no stent, 5 passes TICI 2C  w/ underling L petrous ICA dissection on 11/3 w/ Dr. March.   -A1c 5.0; LDL 60  -No statin indicated given low LDL  -Aspirin 325mg daily  -Plavix 75mg daily   rosuvastatin 5mg QD  -Will be on DAPT for 3 months and follow up with outpt neurology  -F/u outpatient caridology for Zio-patch to r/o cardioembolic source --ILR to be considered as outpatient    #Urinary Retention   - continue with bladder scans  - flomax  - treat constipation aggressively   - changing to macrobid after reviewing culture     # Normocytic anemia (stable)   -Monitor H/H (12.2/37.2- on 11/14)  -F/u outpatient colonoscopy for hx of colon polps  -Transfuse <7 PRN    -Iron studies: Transferrin: 11, TIBC: 170, Iron: 18, Ferritin: 414    # HTN   cw losartan and norvsac     #RHETT  #URI   #Cough   -CXR- negative; repeat ordered per pulm this AM, will f/u result  -CPAP HS --unknown settings will need to confirm with wife  -Albuterol PRN  -On Flovent at home (not available here)  - supplemental O2 PRN, taper to 1 L and maintain sat > 90%    #Gout  -Colchicine 0.6mg daily  -Uric acid 11/12- 6.8     # R Soleal Vein DVT  - Duplex U/S showed DVT below the knee, R soleal vein thrombosis 11/14  - Aspirin 325 mg daily  - Plavix 75 mg daily  - Lovenox 40 mg SubQ daily 11/15  --f/u weekly dopplers    #Depression  -Paxil 30mg daily

## 2024-11-21 LAB
ALBUMIN SERPL ELPH-MCNC: 2.7 G/DL — LOW (ref 3.3–5)
ALP SERPL-CCNC: 59 U/L — SIGNIFICANT CHANGE UP (ref 40–120)
ALT FLD-CCNC: 31 U/L — SIGNIFICANT CHANGE UP (ref 10–45)
ANION GAP SERPL CALC-SCNC: 4 MMOL/L — LOW (ref 5–17)
AST SERPL-CCNC: 27 U/L — SIGNIFICANT CHANGE UP (ref 10–40)
BASOPHILS # BLD AUTO: 0.03 K/UL — SIGNIFICANT CHANGE UP (ref 0–0.2)
BASOPHILS NFR BLD AUTO: 0.4 % — SIGNIFICANT CHANGE UP (ref 0–2)
BILIRUB SERPL-MCNC: 0.6 MG/DL — SIGNIFICANT CHANGE UP (ref 0.2–1.2)
BUN SERPL-MCNC: 16 MG/DL — SIGNIFICANT CHANGE UP (ref 7–23)
CALCIUM SERPL-MCNC: 8.8 MG/DL — SIGNIFICANT CHANGE UP (ref 8.4–10.5)
CHLORIDE SERPL-SCNC: 105 MMOL/L — SIGNIFICANT CHANGE UP (ref 96–108)
CO2 SERPL-SCNC: 30 MMOL/L — SIGNIFICANT CHANGE UP (ref 22–31)
CREAT SERPL-MCNC: 0.94 MG/DL — SIGNIFICANT CHANGE UP (ref 0.5–1.3)
EGFR: 87 ML/MIN/1.73M2 — SIGNIFICANT CHANGE UP
EOSINOPHIL # BLD AUTO: 0.16 K/UL — SIGNIFICANT CHANGE UP (ref 0–0.5)
EOSINOPHIL NFR BLD AUTO: 1.9 % — SIGNIFICANT CHANGE UP (ref 0–6)
GLUCOSE SERPL-MCNC: 105 MG/DL — HIGH (ref 70–99)
HCT VFR BLD CALC: 34.6 % — LOW (ref 39–50)
HGB BLD-MCNC: 11.5 G/DL — LOW (ref 13–17)
IMM GRANULOCYTES NFR BLD AUTO: 0.5 % — SIGNIFICANT CHANGE UP (ref 0–0.9)
LYMPHOCYTES # BLD AUTO: 1.63 K/UL — SIGNIFICANT CHANGE UP (ref 1–3.3)
LYMPHOCYTES # BLD AUTO: 19.6 % — SIGNIFICANT CHANGE UP (ref 13–44)
MCHC RBC-ENTMCNC: 30.6 PG — SIGNIFICANT CHANGE UP (ref 27–34)
MCHC RBC-ENTMCNC: 33.2 G/DL — SIGNIFICANT CHANGE UP (ref 32–36)
MCV RBC AUTO: 92 FL — SIGNIFICANT CHANGE UP (ref 80–100)
MONOCYTES # BLD AUTO: 0.61 K/UL — SIGNIFICANT CHANGE UP (ref 0–0.9)
MONOCYTES NFR BLD AUTO: 7.3 % — SIGNIFICANT CHANGE UP (ref 2–14)
NEUTROPHILS # BLD AUTO: 5.86 K/UL — SIGNIFICANT CHANGE UP (ref 1.8–7.4)
NEUTROPHILS NFR BLD AUTO: 70.3 % — SIGNIFICANT CHANGE UP (ref 43–77)
NRBC # BLD: 0 /100 WBCS — SIGNIFICANT CHANGE UP (ref 0–0)
PLATELET # BLD AUTO: 249 K/UL — SIGNIFICANT CHANGE UP (ref 150–400)
POTASSIUM SERPL-MCNC: 3.9 MMOL/L — SIGNIFICANT CHANGE UP (ref 3.5–5.3)
POTASSIUM SERPL-SCNC: 3.9 MMOL/L — SIGNIFICANT CHANGE UP (ref 3.5–5.3)
PROT SERPL-MCNC: 6 G/DL — SIGNIFICANT CHANGE UP (ref 6–8.3)
RBC # BLD: 3.76 M/UL — LOW (ref 4.2–5.8)
RBC # FLD: 12 % — SIGNIFICANT CHANGE UP (ref 10.3–14.5)
SODIUM SERPL-SCNC: 139 MMOL/L — SIGNIFICANT CHANGE UP (ref 135–145)
WBC # BLD: 8.33 K/UL — SIGNIFICANT CHANGE UP (ref 3.8–10.5)
WBC # FLD AUTO: 8.33 K/UL — SIGNIFICANT CHANGE UP (ref 3.8–10.5)

## 2024-11-21 PROCEDURE — 99232 SBSQ HOSP IP/OBS MODERATE 35: CPT

## 2024-11-21 PROCEDURE — 93970 EXTREMITY STUDY: CPT | Mod: 26

## 2024-11-21 RX ORDER — SIMETHICONE 125 MG
80 CAPSULE ORAL
Refills: 0 | Status: DISCONTINUED | OUTPATIENT
Start: 2024-11-21 | End: 2024-12-05

## 2024-11-21 RX ADMIN — Medication 0.6 MILLIGRAM(S): at 12:06

## 2024-11-21 RX ADMIN — Medication 2 PUFF(S): at 20:08

## 2024-11-21 RX ADMIN — AMLODIPINE BESYLATE 5 MILLIGRAM(S): 10 TABLET ORAL at 05:37

## 2024-11-21 RX ADMIN — PANTOPRAZOLE SODIUM 40 MILLIGRAM(S): 40 TABLET, DELAYED RELEASE ORAL at 05:37

## 2024-11-21 RX ADMIN — Medication 325 MILLIGRAM(S): at 12:07

## 2024-11-21 RX ADMIN — Medication 2 TABLET(S): at 21:35

## 2024-11-21 RX ADMIN — NITROFURANTOIN 100 MILLIGRAM(S): 100 CAPSULE ORAL at 05:53

## 2024-11-21 RX ADMIN — PAROXETINE HYDROCHLORIDE HEMIHYDRATE 30 MILLIGRAM(S): 37.5 TABLET, FILM COATED, EXTENDED RELEASE ORAL at 12:07

## 2024-11-21 RX ADMIN — CLOPIDOGREL 75 MILLIGRAM(S): 75 TABLET, FILM COATED ORAL at 12:50

## 2024-11-21 RX ADMIN — POLYETHYLENE GLYCOL 3350 17 GRAM(S): 17 POWDER, FOR SOLUTION ORAL at 05:37

## 2024-11-21 RX ADMIN — Medication 2 PUFF(S): at 08:09

## 2024-11-21 RX ADMIN — TAMSULOSIN HYDROCHLORIDE 0.4 MILLIGRAM(S): 0.4 CAPSULE ORAL at 21:35

## 2024-11-21 RX ADMIN — POLYETHYLENE GLYCOL 3350 17 GRAM(S): 17 POWDER, FOR SOLUTION ORAL at 17:10

## 2024-11-21 RX ADMIN — LOSARTAN POTASSIUM 50 MILLIGRAM(S): 100 TABLET, FILM COATED ORAL at 05:37

## 2024-11-21 RX ADMIN — NITROFURANTOIN 100 MILLIGRAM(S): 100 CAPSULE ORAL at 17:10

## 2024-11-21 RX ADMIN — ACETAMINOPHEN, DIPHENHYDRAMINE HCL, PHENYLEPHRINE HCL 9 MILLIGRAM(S): 325; 25; 5 TABLET ORAL at 21:35

## 2024-11-21 RX ADMIN — ROSUVASTATIN CALCIUM 5 MILLIGRAM(S): 5 TABLET, FILM COATED ORAL at 12:06

## 2024-11-21 RX ADMIN — Medication 80 MILLIGRAM(S): at 13:38

## 2024-11-21 RX ADMIN — Medication 1000 UNIT(S): at 12:06

## 2024-11-21 RX ADMIN — ENOXAPARIN SODIUM 40 MILLIGRAM(S): 30 INJECTION SUBCUTANEOUS at 17:10

## 2024-11-21 RX ADMIN — Medication 80 MILLIGRAM(S): at 19:30

## 2024-11-21 NOTE — PROGRESS NOTE ADULT - SUBJECTIVE AND OBJECTIVE BOX
CC: Patient is a 69y old  Male who presents with a chief complaint of L MCA infarct w/ hemorrhagic conversion (21 Nov 2024 14:55)      Interval History:  Chart reviewed  Patient seen and examined  No major overnight events  No complaints this morning    Care Discussed with Consultants/Other Providers: Yes    ALLERGIES:  penicillin (Swelling)    MEDICATIONS  (STANDING):  albuterol    90 MICROgram(s) HFA Inhaler 2 Puff(s) Inhalation every 6 hours  amLODIPine   Tablet 5 milliGRAM(s) Oral daily  aspirin 325 milliGRAM(s) Oral daily  cholecalciferol 1000 Unit(s) Oral daily  clopidogrel Tablet 75 milliGRAM(s) Oral daily  colchicine 0.6 milliGRAM(s) Oral daily  enoxaparin Injectable 40 milliGRAM(s) SubCutaneous every 24 hours  losartan 50 milliGRAM(s) Oral daily  melatonin 9 milliGRAM(s) Oral at bedtime  nitrofurantoin monohydrate/macrocrystals (MACROBID) 100 milliGRAM(s) Oral two times a day  pantoprazole    Tablet 40 milliGRAM(s) Oral before breakfast  PARoxetine 30 milliGRAM(s) Oral daily  polyethylene glycol 3350 17 Gram(s) Oral two times a day  rosuvastatin 5 milliGRAM(s) Oral daily  senna 2 Tablet(s) Oral at bedtime  tamsulosin 0.4 milliGRAM(s) Oral at bedtime    MEDICATIONS  (PRN):  acetaminophen     Tablet .. 650 milliGRAM(s) Oral every 6 hours PRN Mild Pain (1 - 3)  atropine 1% Ophthalmic Solution for SubLingual Use 1 Drop(s) SubLingual every 8 hours PRN cough, and increased upper airway secretions  bisacodyl Suppository 10 milliGRAM(s) Rectal daily PRN Constipation  simethicone 80 milliGRAM(s) Chew four times a day PRN Dyspepsia    Vital Signs Last 24 Hrs  T(F): 98.4 (21 Nov 2024 07:50), Max: 98.5 (20 Nov 2024 20:15)  HR: 65 (21 Nov 2024 08:09) (60 - 70)  BP: 113/79 (21 Nov 2024 07:50) (111/65 - 121/69)  RR: 16 (21 Nov 2024 07:50) (16 - 16)  SpO2: 93% (21 Nov 2024 08:09) (93% - 96%)  I&O's Summary    20 Nov 2024 07:01  -  21 Nov 2024 07:00  --------------------------------------------------------  IN: 0 mL / OUT: 600 mL / NET: -600 mL    21 Nov 2024 07:01  -  21 Nov 2024 16:31  --------------------------------------------------------  IN: 0 mL / OUT: 650 mL / NET: -650 mL          PHYSICAL EXAM:  GENERAL: NAD  NERVOUS SYSTEM:  CN II - X grossly intact; Sensation intact; follows commands  CHEST/LUNG: Clear to percussion bilaterally; No rales, rhonchi, wheezing, or rubs; normal respiratory effort, no intercostal retractions  HEART: Regular rate and rhythm; No murmurs, rubs, or gallops; No pitting edema  ABDOMEN: Soft, Nontender, Nondistended; Bowel sounds present; No HSM or masses  MUSCULOSKELETAL/EXTREMITIES:  No clubbing or digital cyanosis  PSYCH: Appropriate affect, Alert    LABS:                        11.5   8.33  )-----------( 249      ( 21 Nov 2024 06:00 )             34.6       11-21    139  |  105  |  16  ----------------------------<  105  3.9   |  30  |  0.94    Ca    8.8      21 Nov 2024 06:00    TPro  6.0  /  Alb  2.7  /  TBili  0.6  /  DBili  x   /  AST  27  /  ALT  31  /  AlkPhos  59  11-21

## 2024-11-21 NOTE — PROGRESS NOTE ADULT - ASSESSMENT
TARUN CRESPO is a 68 y/o M w/ PMHx HLD, depression, GERD, gout, colonic polyps,  congenital absence of one kidney, recent cholecystitis s/p cholecystectomy (6/24), RHETT on CPAP orginially presented to [facility] on 11/3 with aphasia, R sided weakness and R facial droop and vigorous cough x 2 weeks s/p COVID vaccine. 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  S/p L ICA terminus occulusion a/p L ICA thrombectomy, no stent, 5 passes TICI 2C  w/ underling L petrous ICA dissection on 11/3 w/ Dr. March. Now admitted to [facility] for functional deficits for initiation of a multidisciplinary rehab program     #L MCA stroke w/ hemorrhagic conversion  #L petrous ICA dissection w/ pesudoaneurysm  -CTH 11/3: hyperdense appearance of L basal ganglia and L frontal lobe gyri, mild LCA edema 2/2 recent ischemia  -CTA H/N 11/3: no evidence of significant stenosis affecting the extra cranial carotid or vertebral arteries. No evidence of aneurysm or significant vascular stenosis at [place] of [place]  -Repeat CTH 11/4: post recent endovascular revascularization of the left internal carotid artery and left middle cerebral artery. Minimally less conspicuous hyperdense appearance of left basal ganglia and left frontal lobe gyri which may represent expected evolution of contrast staining  -MRI brain 11/5: evolving recent infarct in L ICA associated with hemorrhagic transformation of the L basal ganglia. Effacement of cerebral sulci with mass effect causing 0.6 mm rightward shift.   -CTH 11/9: evolving acute to subacute left MCA infarct with evolving left basal ganglia hemorrhagic transformation, new punctuate foci of increased hyperdensity. Stable mass effect and 5mm rightward midline shift  -CTA 11/9: stable left internal carotid artery pseudoaneurysm immediately inferior to the petrous portion of the L ICA at the area of focal dissection.  -S/p L ICA terminus occulusion a/p L ICA thrombectomy, no stent, 5 passes TICI 2C  w/ underling L petrous ICA dissection on 11/3 w/ Dr. March.   -A1c 5.0; LDL 60  -No statin indicated given low LDL  -Aspirin 325mg daily  -Plavix 75mg daily   rosuvastatin 5mg QD  -Will be on DAPT for 3 months and follow up with outpt neurology  -F/u outpatient caridology for Zio-patch to r/o cardioembolic source --ILR to be considered as outpatient    #Urinary Retention   - continue with bladder scans  - flomax  - treat constipation aggressively   - changing to macrobid after reviewing culture     # Normocytic anemia (stable)   -Monitor H/H (12.2/37.2- on 11/14)  -F/u outpatient colonoscopy for hx of colon polps  -Transfuse <7 PRN    -Iron studies: Transferrin: 11, TIBC: 170, Iron: 18, Ferritin: 414    # HTN   cw losartan and norvsac     #RHETT  #URI   #Cough   -CXR- negative; repeat ordered per pulm this AM, will f/u result  -CPAP HS --unknown settings will need to confirm with wife  -Albuterol PRN  -On Flovent at home (not available here)  - supplemental O2 PRN, taper to 1 L and maintain sat > 90%    #Gout  -Colchicine 0.6mg daily  -Uric acid 11/12- 6.8     # R Soleal Vein DVT  - Duplex U/S showed DVT below the knee, R soleal vein thrombosis 11/14  - Aspirin 325 mg daily  - Plavix 75 mg daily  - Lovenox 40 mg SubQ daily 11/15  --f/u weekly dopplers    #Depression  -Paxil 30mg daily

## 2024-11-21 NOTE — PROGRESS NOTE ADULT - SUBJECTIVE AND OBJECTIVE BOX
HPI:  68 y/o M w/ PMHx HLD, depression, GERD, gout, colonic polyps,  congenital absence of one kidney, recent cholecystitis s/p cholecystectomy (6/24), RHETT on CPAP orginially presented to [facility] on 11/3 with aphasia, R sided weakness and R facial droop and vigorous cough x 2 weeks s/p COVID vaccine. LKW 2045 on 11/2. 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 Patient transferred to [facility] for mechanical thrombectomy. S/p L ICA terminus occulusion a/p L ICA thrombectomy, no stent, 5 passes TICI 2C  w/ underling L petrous ICA dissection on 11/3 w/ Dr. March. Post op course c/b slight pseudoaneurysm enlargement w/ dissection distal embolisim into the terminal ICA seen on 11/4 CTA and hemorrhagic transformation of L MCA infarct.   Of note, hospital course also significant for fevers, suspect aspiration pneumonitis treated with Rocephin x 1, and dysphagia requiring easy to chew,  with mod thick liquids. EP was consulted with concerns of cardioembolic source for infarct, recommended to f/u outpatient for Zio patch.   Patient had an MBS on 11/12 which showed moderate oropharyngeal dysphagia, remarkable for delayed/reduced laryngeal vestibular closure, pharyngeal trigger delay and reduced hyolaryngeal elevation/excursion.  There was deep laryngeal penetration and possibly aspiration of thin liquid.  Patient was cleared for easy to chew GI diet with mildly thick liquids.    Patient optimized and was evaluated by PM&R and therapy for functional deficits, gait/ADL impairments and acute rehabilitation was recommended. Patient was cleared for discharge to [facility] [place] IRU on 11/13/24.  (13 Nov 2024 15:44)    Subjective:  Patient seen this AM. Stable overnight. Continues to wear NC, satting well. He communicates with head nodding and denies pain, SOB, chest pain, headache, abdominal pain, n/v/d. Denies urinating. Requires ISC, ~600 last night and this morning. Denies bowel movement today, LBM yesterday.     Vital Signs Last 24 Hrs  T(C): 36.9 (21 Nov 2024 07:50), Max: 36.9 (20 Nov 2024 20:15)  T(F): 98.4 (21 Nov 2024 07:50), Max: 98.5 (20 Nov 2024 20:15)  HR: 65 (21 Nov 2024 08:09) (60 - 70)  BP: 113/79 (21 Nov 2024 07:50) (111/65 - 121/69)  BP(mean): --  RR: 16 (21 Nov 2024 07:50) (16 - 16)  SpO2: 93% (21 Nov 2024 08:09) (93% - 96%)    Parameters below as of 21 Nov 2024 08:09  Patient On (Oxygen Delivery Method): nasal cannula w/ humidification, 2L        REVIEW OF SYMPTOMS  Neurological deficits      PHYSICAL EXAM:   Constitutional - NAD, Comfortable   HEENT - NCAT, EOMI   Neck - Supple, No limited ROM  Chest - good chest expansion, good respiratory effort, CTAB    Cardio - warm and well perfused, RRR, no murmur   Abdomen -  Soft, NTND   Extremities - No peripheral edema, No calf tenderness      Neurologic Exam:                       Cognitive -              Orientation: Awake, Alert, Orientation limited due to aphasia            Attention:  limited due to aphasia; can follow simple 1 step commands; 2 step commands with delayed processing            Memory: unable to test due to aphasia     Speech – + Aphasia--Nonfluent, Impaired multiple step command following, but able to follow all simple commands, impaired repetition; + dysarthria,  able to phonate "aah" but did not phonate or verbalize any words.       Cranial Nerves – PERRLA , EOMI, + left facial weakness; facial sensation impaired on right, + dysarthria, + dysphagia, Shoulder shrug impaired        Motor -                        LEFT    UE - ShAB 5/5, EF 5/5, EE 5/5, WE 5/5,  5/5                     RIGHT UE - ShAB 0/5, EF 0/5, EE 0/5, WE 0/5,  0/5                     LEFT    LE - HF 5/5, KE 5/5, DF 5/5, PF 5/5                     RIGHT LE - HF 0/5, KE 0/5, DF 0/5, PF 0/5        Coordination - FTN/HTS intact on the left; unable to assess due to weaknes son the right     Sensory – Impaired to LT  on right side;-- withdraws to pain Left LE but no response left UE     Reflexes - DTR  2/ 4 , neg Soria's b/l,  Tone: no increased tone    MSK: right shoulder PROM WNL, +sublux  Psychiatric - Mood stable, Affect WNL   Skin on admission: intact        RECENT LABS                        11.5   8.33  )-----------( 249      ( 21 Nov 2024 06:00 )             34.6     11-21    139  |  105  |  16  ----------------------------<  105[H]  3.9   |  30  |  0.94    Ca    8.8      21 Nov 2024 06:00    TPro  6.0  /  Alb  2.7[L]  /  TBili  0.6  /  DBili  x   /  AST  27  /  ALT  31  /  AlkPhos  59  11-21        RADIOLOGY/OTHER RESULTS      MEDICATIONS  (STANDING):  albuterol    90 MICROgram(s) HFA Inhaler 2 Puff(s) Inhalation every 6 hours  amLODIPine   Tablet 5 milliGRAM(s) Oral daily  aspirin 325 milliGRAM(s) Oral daily  cholecalciferol 1000 Unit(s) Oral daily  clopidogrel Tablet 75 milliGRAM(s) Oral daily  colchicine 0.6 milliGRAM(s) Oral daily  enoxaparin Injectable 40 milliGRAM(s) SubCutaneous every 24 hours  losartan 50 milliGRAM(s) Oral daily  melatonin 9 milliGRAM(s) Oral at bedtime  nitrofurantoin monohydrate/macrocrystals (MACROBID) 100 milliGRAM(s) Oral two times a day  pantoprazole    Tablet 40 milliGRAM(s) Oral before breakfast  PARoxetine 30 milliGRAM(s) Oral daily  polyethylene glycol 3350 17 Gram(s) Oral two times a day  rosuvastatin 5 milliGRAM(s) Oral daily  senna 2 Tablet(s) Oral at bedtime  tamsulosin 0.4 milliGRAM(s) Oral at bedtime    MEDICATIONS  (PRN):  acetaminophen     Tablet .. 650 milliGRAM(s) Oral every 6 hours PRN Mild Pain (1 - 3)  atropine 1% Ophthalmic Solution for SubLingual Use 1 Drop(s) SubLingual every 8 hours PRN cough, and increased upper airway secretions  bisacodyl Suppository 10 milliGRAM(s) Rectal daily PRN Constipation

## 2024-11-21 NOTE — PROGRESS NOTE ADULT - ASSESSMENT
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  S/p L ICA terminus occulusion a/p L ICA thrombectomy,  w/ underling L petrous ICA dissection     # L MCA stroke w/ hemorrhagic conversion  # L petrous ICA dissection w/ pesudoaneurysm  - CTH 11/9: evolving acute to subacute left MCA infarct with evolving left basal ganglia hemorrhagic transformation, new punctuate foci of increased hyperdensity. Stable mass effect and 5mm rightward midline shift  - Aspirin 325mg daily  - Plavix 75mg daily   - Rosuvastatin 5mg QD  - outpatient cardiology f/u for w/u r/o cardioembolic source  - continue comprehensive rehab program, 3 hours a day, 5 days a week.    # R Soleal Vein DVT  - Duplex U/S showed DVT below the knee, R soleal vein thrombosis 11/14  - Lovenox 40 mg SubQ daily 11/15  - f/u weekly dopplers, ordered for 11/21. Pending today.     #Urinary Retention, unimproved  #Constipation, resolving  -continue bladder scans  -UA without gross contamination, but per hospitalist will start cipro 11/18 empirically and send urine culture.  UCx resulted with cipro resistance, so macrobid started. Last day 11/27.   -KUB 11/17: Nonobstructed bowel gas pattern. Enteric contrast throughout the colon with moderately large stool burden.  -LBM recorded 11/17  -KUB ordered 11/18, follow results  -flomax 0.4 initiated daily 11/18  -required straight cath today, 600 CC removed in the morning,   --Will cont. to monitor-  -CTM constipation, dulcolax suppository PRN ordered.     UTI--  -- u/a +  -- on cipro, discontinued 11/20  - 11/20 started macrobid due to sensitivities. Last day 11/27  -- urine Cx +Enteroccocus  --may likely be contributing to  retention  -- Provider to RN order written 11/20 for PVRs to be collected if any spontaneous voiding occurs.    # Normocytic anemia (stable) vs Iron deficiency anemia   - F/u outpatient colonoscopy for hx of colon polps  - Transfuse <7 PRN    - Iron studies: Transferrin: 11, TIBC: 170, Iron: 18, Ferritin: 414  - monitor H/H. Hgb 12.0 11/18 stable  - CBC 11/21    # RHETT  # Activity-Induced Hypoxia  - CXR- negative   - CPAP HS 5 50% O2. Poor compliance  - O2 via NC PRN  - O2 up to 3L NC as needed during therapy, per hospitalist, taper to 1L O2, with O2 Sat >90%, CTM  - Albuterol PRN    # Gout  - Colchicine 0.6mg daily  - Uric acid 11/12- 6.8     # Mood/Cognition  # Depression  - Paxil 30mg daily     # Sleep:   -cont. melatonin 9 qhs     # Pain Management:  - Tylenol PRN    # GI/Bowel:  - Senna QHS  - Miralax BIDD  - Bisacodyl suppository QD PRN  - GI ppx: Pantoprazole 40mg daily   - constipation with abdominal discomfort: will give dulcolax suppository. If persists, AXR  - per SLP, will initiate free water protocol   - continue with aggressive bowel regimen to assist with urinary retention    # Dysphagia:  - S/p MBS on 11/12--> diet: Easy to chew; mild thick liquids- DASH  - 1:1 feeds    # LABS  CBC CMP 11/25   monitor BM    Letter for cruise ship cancellation due to patient's injury.     IDT 11/19  Omero  RN - incontinent of bowel, requiring SC  SW - condo with wife, 3-4 arely. elevator to 7th floor.  Independent prior, driving and working.  Wife is retired, main support and she drives.  SLP - easy to chew with mildly thick liquids.  Severe apraxia, aphasia, dysarthria. He can approximate single words with multiple cues.  He has picture board for AAC.  On free water protocol.  Repeat MBS end of this week or beginning of next week.  Suspect cog deficits but difficult to determine due to apraxia and dysarthria, and aphasia.  OT - supervision with eating, jose l for oral hygiene, maxA for bathing, UBD, and totA for toileting, LBD, commode transfer.  No shower transfer yet.  Today, mod2 for squat pivot, no active movement UE.  Therapy requesting max time.  Goals are jose l at  level.  PT - bed mob maxA; transfer totA; non ambulatory.  Goal is Jose L  Goal 1 - use multimodalities to communicate basic wants and needs with staff max cues.     Goal 2 - transfer OOB with minimal assist  barriers - communication, hemiplegia, sensory impairment, aphasia, poor endurance.    admit GC 11/13.    CMG is 12/5.  DC 12/5 to [facility]        Dr Cobian called Ms Elaine Jamil 11/19 and spoke with her to update her on Michael's medical status, as well as his functional status and planned discharge date and disposition plan.    ---------------  Code Status: FULL  Emergency Contact:    Outpatient Follow-up (Specialty/Name of physician):  Dr. 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  S/p L ICA terminus occulusion a/p L ICA thrombectomy,  w/ underling L petrous ICA dissection     # L MCA stroke w/ hemorrhagic conversion  # L petrous ICA dissection w/ pesudoaneurysm  - CTH 11/9: evolving acute to subacute left MCA infarct with evolving left basal ganglia hemorrhagic transformation, new punctuate foci of increased hyperdensity. Stable mass effect and 5mm rightward midline shift  - Aspirin 325mg daily  - Plavix 75mg daily   - Rosuvastatin 5mg QD  - outpatient cardiology f/u for w/u r/o cardioembolic source  - continue comprehensive rehab program, 3 hours a day, 5 days a week.    # R Soleal Vein DVT  - Duplex U/S showed DVT below the knee, R soleal vein thrombosis 11/14  - Lovenox 40 mg SubQ daily 11/15  - f/u weekly dopplers, ordered for 11/21. Pending today.     #Urinary Retention, unimproved  #Constipation, resolving  -continue bladder scans  -UA without gross contamination, but per hospitalist will start cipro 11/18 empirically and send urine culture.  UCx resulted with cipro resistance, so macrobid started. Last day 11/27.   -KUB 11/17: Nonobstructed bowel gas pattern. Enteric contrast throughout the colon with moderately large stool burden.  -LBM recorded 11/17  -KUB ordered 11/18, follow results  -flomax 0.4 initiated daily 11/18  -required straight cath today, 600 CC removed in the morning,   --Will cont. to monitor-  -CTM constipation, dulcolax suppository PRN ordered.     UTI--  -- u/a +  -- on cipro, discontinued 11/20  - 11/20 started macrobid due to sensitivities. Last day 11/27  -- urine Cx +Enteroccocus  --may likely be contributing to  retention  -- Provider to RN order written 11/20 for PVRs to be collected if any spontaneous voiding occurs.    # Normocytic anemia (stable) vs Iron deficiency anemia   - F/u outpatient colonoscopy for hx of colon polps  - Transfuse <7 PRN    - Iron studies: Transferrin: 11, TIBC: 170, Iron: 18, Ferritin: 414  - monitor H/H. Hgb 12.0 11/18 stable  - CBC 11/21    # RHETT  # Activity-Induced Hypoxia  - CXR- negative   - CPAP HS 5 50% O2. Poor compliance  - O2 via NC PRN  - O2 up to 3L NC as needed during therapy, per hospitalist, taper to 1L O2, with O2 Sat >90%, CTM  - Albuterol PRN    # Gout  - Colchicine 0.6mg daily  - Uric acid 11/12- 6.8     # Mood/Cognition  # Depression  - Paxil 30mg daily     # Sleep:   -cont. melatonin 9 qhs     # Pain Management:  - Tylenol PRN    # GI/Bowel:  - Senna QHS  - Miralax BIDD  - Bisacodyl suppository QD PRN  - simethicone for gas pain  - GI ppx: Pantoprazole 40mg daily   - constipation with abdominal discomfort: will give dulcolax suppository. If persists, AXR  - per SLP, will initiate free water protocol   - continue with aggressive bowel regimen to assist with urinary retention    # Dysphagia:  - S/p MBS on 11/12--> diet: Easy to chew; mild thick liquids- DASH  - 1:1 feeds    # LABS  CBC CMP 11/25   monitor BM    Letter for cruise ship cancellation due to patient's injury.     IDT 11/19  Omero  RN - incontinent of bowel, requiring SC  SW - condo with wife, 3-4 arely. elevator to 7th floor.  Independent prior, driving and working.  Wife is retired, main support and she drives.  SLP - easy to chew with mildly thick liquids.  Severe apraxia, aphasia, dysarthria. He can approximate single words with multiple cues.  He has picture board for AAC.  On free water protocol.  Repeat MBS end of this week or beginning of next week.  Suspect cog deficits but difficult to determine due to apraxia and dysarthria, and aphasia.  OT - supervision with eating, jose l for oral hygiene, maxA for bathing, UBD, and totA for toileting, LBD, commode transfer.  No shower transfer yet.  Today, mod2 for squat pivot, no active movement UE.  Therapy requesting max time.  Goals are jose l at WC level.  PT - bed mob maxA; transfer totA; non ambulatory.  Goal is Jose L  Goal 1 - use multimodalities to communicate basic wants and needs with staff max cues.     Goal 2 - transfer OOB with minimal assist  barriers - communication, hemiplegia, sensory impairment, aphasia, poor endurance.    admit GC 11/13.    CMG is 12/5.  DC 12/5 to [facility]        Dr Cobian called Ms Elaine Jamil 11/19 and spoke with her to update her on Michael's medical status, as well as his functional status and planned discharge date and disposition plan.    ---------------  Code Status: FULL  Emergency Contact:    Outpatient Follow-up (Specialty/Name of physician):  Dr. 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  S/p L ICA terminus occulusion a/p L ICA thrombectomy,  w/ underling L petrous ICA dissection     # L MCA stroke w/ hemorrhagic conversion  # L petrous ICA dissection w/ pesudoaneurysm  - CTH 11/9: evolving acute to subacute left MCA infarct with evolving left basal ganglia hemorrhagic transformation, new punctuate foci of increased hyperdensity. Stable mass effect and 5mm rightward midline shift  - Aspirin 325mg daily  - Plavix 75mg daily   - Rosuvastatin 5mg QD  - outpatient cardiology f/u for w/u r/o cardioembolic source  - continue comprehensive rehab program, 3 hours a day, 5 days a week.    # R Soleal Vein DVT  - Duplex U/S showed DVT below the knee, R soleal vein thrombosis 11/14  - Lovenox 40 mg SubQ daily 11/15  - f/u weekly dopplers, ordered for 11/21. Pending today.     #Urinary Retention, unimproved  #Constipation, resolving  -continue bladder scans  -UA without gross contamination, but per hospitalist will start cipro 11/18 empirically and send urine culture.  UCx resulted with cipro resistance, so macrobid started. Last day 11/27.   -KUB 11/17: Nonobstructed bowel gas pattern. Enteric contrast throughout the colon with moderately large stool burden.  -LBM recorded 11/17  -KUB ordered 11/18, follow results  -flomax 0.4 initiated daily 11/18  -required straight cath today, 600 CC removed in the morning,   --Will cont. to monitor-  -CTM constipation, dulcolax suppository PRN ordered.     UTI--  -- u/a +  -- on cipro, discontinued 11/20  - 11/20 started macrobid due to sensitivities. Last day 11/27  -- urine Cx +Enteroccocus  --may likely be contributing to  retention  -- Provider to RN order written 11/20 for PVRs to be collected if any spontaneous voiding occurs.    # Normocytic anemia (stable) vs Iron deficiency anemia   - F/u outpatient colonoscopy for hx of colon polps  - Transfuse <7 PRN    - Iron studies: Transferrin: 11, TIBC: 170, Iron: 18, Ferritin: 414  - monitor H/H. Hgb 12.0 11/18 stable  - CBC 11/21    # RHETT  # Activity-Induced Hypoxia  - CXR- negative   - CPAP HS 5 50% O2. Poor compliance  - O2 via NC PRN  - O2 up to 3L NC as needed during therapy, per hospitalist, taper to 1L O2, with O2 Sat >90%, CTM  - Albuterol PRN    # Gout  - Colchicine 0.6mg daily  - Uric acid 11/12- 6.8     # Mood/Cognition  # Depression  - Paxil 30mg daily     # Sleep:   -cont. melatonin 9 qhs     # Pain Management:  - Tylenol PRN    # GI/Bowel:  - Senna QHS  - Miralax BIDD  - Bisacodyl suppository QD PRN  - simethicone for gas pain  - GI ppx: Pantoprazole 40mg daily   - constipation with abdominal discomfort: will give dulcolax suppository. If persists, AXR  - per SLP, will initiate free water protocol   - continue with aggressive bowel regimen to assist with urinary retention    # Dysphagia:  - S/p MBS on 11/12--> diet: Easy to chew; mild thick liquids- DASH  - 1:1 feeds    # LABS  CBC CMP 11/25   monitor BM    Letter for cruise ship cancellation due to patient's injury.     IDT 11/19  Omero  RN - incontinent of bowel, requiring SC  SW - condo with wife, 3-4 arely. elevator to 7th floor.  Independent prior, driving and working.  Wife is retired, main support and she drives.  SLP - easy to chew with mildly thick liquids.  Severe apraxia, aphasia, dysarthria. He can approximate single words with multiple cues.  He has picture board for AAC.  On free water protocol.  Repeat MBS end of this week or beginning of next week.  Suspect cog deficits but difficult to determine due to apraxia and dysarthria, and aphasia.  OT - supervision with eating, jose l for oral hygiene, maxA for bathing, UBD, and totA for toileting, LBD, commode transfer.  No shower transfer yet.  Today, mod2 for squat pivot, no active movement UE.  Therapy requesting max time.  Goals are jose l at WC level.  PT - bed mob maxA; transfer totA; non ambulatory.  Goal is Jose L  Goal 1 - use multimodalities to communicate basic wants and needs with staff max cues.     Goal 2 - transfer OOB with minimal assist  barriers - communication, hemiplegia, sensory impairment, aphasia, poor endurance.    admit GC 11/13.    CMG is 12/5.  DC 12/5 to [facility]            ---------------  Code Status: FULL  Emergency Contact:    Outpatient Follow-up (Specialty/Name of physician):  Dr. Cheng March  Neurology  12/24/24 appt     Cardiology    ENT  [phone]

## 2024-11-22 PROCEDURE — 99232 SBSQ HOSP IP/OBS MODERATE 35: CPT

## 2024-11-22 RX ORDER — LOSARTAN POTASSIUM 100 MG/1
25 TABLET, FILM COATED ORAL DAILY
Refills: 0 | Status: DISCONTINUED | OUTPATIENT
Start: 2024-11-23 | End: 2024-11-27

## 2024-11-22 RX ADMIN — NITROFURANTOIN 100 MILLIGRAM(S): 100 CAPSULE ORAL at 05:14

## 2024-11-22 RX ADMIN — PANTOPRAZOLE SODIUM 40 MILLIGRAM(S): 40 TABLET, DELAYED RELEASE ORAL at 05:14

## 2024-11-22 RX ADMIN — TAMSULOSIN HYDROCHLORIDE 0.4 MILLIGRAM(S): 0.4 CAPSULE ORAL at 22:17

## 2024-11-22 RX ADMIN — Medication 0.6 MILLIGRAM(S): at 11:21

## 2024-11-22 RX ADMIN — NITROFURANTOIN 100 MILLIGRAM(S): 100 CAPSULE ORAL at 17:34

## 2024-11-22 RX ADMIN — Medication 1000 UNIT(S): at 11:19

## 2024-11-22 RX ADMIN — AMLODIPINE BESYLATE 5 MILLIGRAM(S): 10 TABLET ORAL at 05:14

## 2024-11-22 RX ADMIN — Medication 2 PUFF(S): at 19:52

## 2024-11-22 RX ADMIN — Medication 80 MILLIGRAM(S): at 17:33

## 2024-11-22 RX ADMIN — CLOPIDOGREL 75 MILLIGRAM(S): 75 TABLET, FILM COATED ORAL at 11:20

## 2024-11-22 RX ADMIN — PAROXETINE HYDROCHLORIDE HEMIHYDRATE 30 MILLIGRAM(S): 37.5 TABLET, FILM COATED, EXTENDED RELEASE ORAL at 11:20

## 2024-11-22 RX ADMIN — ACETAMINOPHEN, DIPHENHYDRAMINE HCL, PHENYLEPHRINE HCL 9 MILLIGRAM(S): 325; 25; 5 TABLET ORAL at 22:17

## 2024-11-22 RX ADMIN — ENOXAPARIN SODIUM 40 MILLIGRAM(S): 30 INJECTION SUBCUTANEOUS at 17:34

## 2024-11-22 RX ADMIN — Medication 325 MILLIGRAM(S): at 11:20

## 2024-11-22 RX ADMIN — Medication 2 PUFF(S): at 15:09

## 2024-11-22 RX ADMIN — LOSARTAN POTASSIUM 50 MILLIGRAM(S): 100 TABLET, FILM COATED ORAL at 05:15

## 2024-11-22 RX ADMIN — Medication 2 TABLET(S): at 22:18

## 2024-11-22 RX ADMIN — POLYETHYLENE GLYCOL 3350 17 GRAM(S): 17 POWDER, FOR SOLUTION ORAL at 17:34

## 2024-11-22 RX ADMIN — ROSUVASTATIN CALCIUM 5 MILLIGRAM(S): 5 TABLET, FILM COATED ORAL at 11:19

## 2024-11-22 NOTE — PROGRESS NOTE ADULT - ASSESSMENT
TARUN CRESPO is a 68 y/o M w/ PMHx HLD, depression, GERD, gout, colonic polyps,  congenital absence of one kidney, recent cholecystitis s/p cholecystectomy (6/24), RHETT on CPAP orginially presented to [facility] on 11/3 with aphasia, R sided weakness and R facial droop and vigorous cough x 2 weeks s/p COVID vaccine. 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  S/p L ICA terminus occulusion a/p L ICA thrombectomy, no stent, 5 passes TICI 2C  w/ underling L petrous ICA dissection on 11/3 w/ Dr. March. Now admitted to [facility] for functional deficits for initiation of a multidisciplinary rehab program     #L MCA stroke w/ hemorrhagic conversion  #L petrous ICA dissection w/ pesudoaneurysm  -CTH 11/3: hyperdense appearance of L basal ganglia and L frontal lobe gyri, mild LCA edema 2/2 recent ischemia  -CTA H/N 11/3: no evidence of significant stenosis affecting the extra cranial carotid or vertebral arteries. No evidence of aneurysm or significant vascular stenosis at [place] of [place]  -Repeat CTH 11/4: post recent endovascular revascularization of the left internal carotid artery and left middle cerebral artery. Minimally less conspicuous hyperdense appearance of left basal ganglia and left frontal lobe gyri which may represent expected evolution of contrast staining  -MRI brain 11/5: evolving recent infarct in L ICA associated with hemorrhagic transformation of the L basal ganglia. Effacement of cerebral sulci with mass effect causing 0.6 mm rightward shift.   -CTH 11/9: evolving acute to subacute left MCA infarct with evolving left basal ganglia hemorrhagic transformation, new punctuate foci of increased hyperdensity. Stable mass effect and 5mm rightward midline shift  -CTA 11/9: stable left internal carotid artery pseudoaneurysm immediately inferior to the petrous portion of the L ICA at the area of focal dissection.  -S/p L ICA terminus occulusion a/p L ICA thrombectomy, no stent, 5 passes TICI 2C  w/ underling L petrous ICA dissection on 11/3 w/ Dr. March.   -A1c 5.0; LDL 60  -No statin indicated given low LDL  -Aspirin 325mg daily  -Plavix 75mg daily   rosuvastatin 5mg QD  -Will be on DAPT for 3 months and follow up with outpt neurology  -F/u outpatient caridology for Zio-patch to r/o cardioembolic source --ILR to be considered as outpatient    #Urinary Retention, repeated > 500cc, new order for jerome, initially deferred  discussed with wife, who will speak to pt, wife reports frequent difficulty with urination at home but no know diagnosis of sever bph (will need outpatient f/u)  - continue with bladder scans if without jerome  - flomax  - treat constipation aggressively   - macrobid after reviewing culture (- Nitrofurantoin: S <=32 )  11/23 bmp. cbc w/ diff, Uric Acid, screening PSA      # Normocytic anemia (stable)   -Monitor H/H (12.2/37.2- on 11/14)  -F/u outpatient colonoscopy for hx of colon polps  -Transfuse <7 PRN    -Iron studies: Transferrin: 11, TIBC: 170, Iron: 18, Ferritin: 414    # HTN   cw losartan and norvsac     #RHETT  #URI   #Cough   -CXR- negative; repeat ordered per pulm this AM, will f/u result  -CPAP HS --unknown settings will need to confirm with wife  -Albuterol PRN  -On Flovent at home (not available here)  - supplemental O2 PRN, taper to 1 L and maintain sat > 90%    #Gout  -Colchicine 0.6mg daily  -Uric acid 11/12- 6.8     # R Soleal Vein DVT  - Duplex U/S showed DVT below the knee, R soleal vein thrombosis 11/14  - Aspirin 325 mg daily  - Plavix 75 mg daily  - Lovenox 40 mg SubQ daily 11/15  --f/u weekly dopplers    #Depression  -Paxil 30mg daily  TARUN CRESPO is a 70 y/o M w/ PMHx HLD, depression, GERD, gout, colonic polyps,  congenital absence of one kidney, recent cholecystitis s/p cholecystectomy (6/24), RHETT on CPAP orginially presented to [facility] on 11/3 with aphasia, R sided weakness and R facial droop and vigorous cough x 2 weeks s/p COVID vaccine. 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  S/p L ICA terminus occulusion a/p L ICA thrombectomy, no stent, 5 passes TICI 2C  w/ underling L petrous ICA dissection on 11/3 w/ Dr. March. Now admitted to [facility] for functional deficits for initiation of a multidisciplinary rehab program     #L MCA stroke w/ hemorrhagic conversion  #L petrous ICA dissection w/ pesudoaneurysm  -CTH 11/3: hyperdense appearance of L basal ganglia and L frontal lobe gyri, mild LCA edema 2/2 recent ischemia  -CTA H/N 11/3: no evidence of significant stenosis affecting the extra cranial carotid or vertebral arteries. No evidence of aneurysm or significant vascular stenosis at [place]  -Repeat CTH 11/4: post recent endovascular revascularization of the left internal carotid artery and left middle cerebral artery. Minimally less conspicuous hyperdense appearance of left basal ganglia and left frontal lobe gyri which may represent expected evolution of contrast staining  -MRI brain 11/5: evolving recent infarct in L ICA associated with hemorrhagic transformation of the L basal ganglia. Effacement of cerebral sulci with mass effect causing 0.6 mm rightward shift.   -CTH 11/9: evolving acute to subacute left MCA infarct with evolving left basal ganglia hemorrhagic transformation, new punctuate foci of increased hyperdensity. Stable mass effect and 5mm rightward midline shift  -CTA 11/9: stable left internal carotid artery pseudoaneurysm immediately inferior to the petrous portion of the L ICA at the area of focal dissection.  -S/p L ICA terminus occulusion a/p L ICA thrombectomy, no stent, 5 passes TICI 2C  w/ underling L petrous ICA dissection on 11/3 w/ Dr. March.   -A1c 5.0; LDL 60  -No statin indicated given low LDL  -Aspirin 325mg daily  -Plavix 75mg daily   rosuvastatin 5mg QD  -Will be on DAPT for 3 months and follow up with outpt neurology  -F/u outpatient caridology for Zio-patch to r/o cardioembolic source --ILR to be considered as outpatient    #Urinary Retention, repeated > 500cc, new order for jerome, initially deferred  discussed with wife, who will speak to pt, wife reports frequent difficulty with urination at home but no know diagnosis of sever bph (will need outpatient f/u)  - continue with bladder scans if without jerome  - flomax  - treat constipation aggressively   - macrobid after reviewing culture (- Nitrofurantoin: S <=32 )  11/23 bmp. cbc w/ diff, Uric Acid, screening PSA      # Normocytic anemia (stable)   -Monitor H/H (12.2/37.2- on 11/14)  -F/u outpatient colonoscopy for hx of colon polps  -Transfuse <7 PRN    -Iron studies: Transferrin: 11, TIBC: 170, Iron: 18, Ferritin: 414    # Primary HTN   cw losartan reduced by and norvsac d/c    #RHETT  #URI   #Cough   -CXR- negative; repeat ordered per pulm this AM, will f/u result  -CPAP HS --unknown settings will need to confirm with wife  -Albuterol PRN  -On Flovent at home (not available here)  - supplemental O2 PRN, taper to 1 L and maintain sat > 90%    #Gout  -Colchicine 0.6mg daily  -Uric acid 11/12- 6.8     # R Soleal Vein DVT  - Duplex U/S showed DVT below the knee, R soleal vein thrombosis 11/14  - Aspirin 325 mg daily  - Plavix 75 mg daily  - Lovenox 40 mg SubQ daily 11/15  --f/u weekly dopplers    #Depression  -Paxil 30mg daily

## 2024-11-22 NOTE — PROGRESS NOTE ADULT - SUBJECTIVE AND OBJECTIVE BOX
HPI:  70 y/o M w/ PMHx HLD, depression, GERD, gout, colonic polyps,  congenital absence of one kidney, recent cholecystitis s/p cholecystectomy (6/24), RHETT on CPAP [facility], R sided weakness and R facial droop and vigorous cough x 2 weeks s/p COVID vaccine. LKW 2045 on 11/2. 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 Patient transferred to [facility] for mechanical thrombectomy. S/p L ICA terminus occulusion a/p L ICA thrombectomy, no stent, 5 passes TICI 2C  w/ underling L petrous ICA dissection on 11/3 w/ Dr. March. Post op course c/b slight pseudoaneurysm enlargement w/ dissection distal embolisim into the terminal ICA seen on 11/4 CTA and hemorrhagic transformation of L MCA infarct.   Of note, hospital course also significant for fevers, suspect aspiration pneumonitis treated with Rocephin x 1, and dysphagia requiring easy to chew,  with mod thick liquids. EP was consulted with concerns of cardioembolic source for infarct, recommended to f/u outpatient for Zio patch.   Patient had an MBS on 11/12 which showed moderate oropharyngeal dysphagia, remarkable for delayed/reduced laryngeal vestibular closure, pharyngeal trigger delay and reduced hyolaryngeal elevation/excursion.  There was deep laryngeal penetration and possibly aspiration of thin liquid.  Patient was cleared for easy to chew GI diet with mildly thick liquids.    Patient optimized and was evaluated by PM&R and therapy for functional deficits, gait/ADL impairments and acute rehabilitation was recommended. Patient was cleared for discharge to [facility] IRU on 11/13/24.  (13 Nov 2024 15:44)          Subjective:  Seen this AM in OT.  Pt. became orthostatic with transfer.   His BP started at 106/69 and HR 69 with sitting, but with standing BP dropped to 94/64 and HR increased to 99.  Pt. was symptomatic.  No void this AM or last couple days.  Was SC for 600cc this AM.         VITALS  Vital Signs Last 24 Hrs  T(C): 36.7 (22 Nov 2024 07:31), Max: 36.8 (21 Nov 2024 16:35)  T(F): 98.1 (22 Nov 2024 07:31), Max: 98.2 (21 Nov 2024 16:35)  HR: 58 (22 Nov 2024 07:31) (55 - 61)  BP: 105/70 (22 Nov 2024 07:31) (105/70 - 122/77)  BP(mean): --  RR: 16 (22 Nov 2024 07:31) (16 - 16)  SpO2: 94% (22 Nov 2024 07:31) (93% - 94%)    Parameters below as of 22 Nov 2024 07:31  Patient On (Oxygen Delivery Method): nasal cannula  O2 Flow (L/min): 2      REVIEW OF SYMPTOMS  Neurological deficits      PHYSICAL EXAM  Constitutional - NAD, Comfortable   HEENT - NCAT, EOMI   Neck - Supple, No limited ROM  Chest - good chest expansion, good respiratory effort, CTAB    Cardio - warm and well perfused, RRR, no murmur   Abdomen -  Soft, NTND   Extremities - No peripheral edema, No calf tenderness      Neurologic Exam:                       Cognitive -              Orientation: Awake, Alert, Orientation limited due to aphasia            Attention:  limited due to aphasia; can follow simple 1 step commands; 2 step commands with delayed processing            Memory: unable to test due to aphasia     Speech – + Aphasia--Nonfluent, Impaired multiple step command following, but able to follow all simple commands, impaired repetition; + dysarthria,  able to phonate "aah" but did not phonate or verbalize any words.       Cranial Nerves – PERRLA , EOMI, + left facial weakness; facial sensation impaired on right, + dysarthria, + dysphagia, Shoulder shrug impaired        Motor -                        LEFT    UE - ShAB 5/5, EF 5/5, EE 5/5, WE 5/5,  5/5                     RIGHT UE - ShAB 0/5, EF 0/5, EE 0/5, WE 0/5,  0/5                     LEFT    LE - HF 5/5, KE 5/5, DF 5/5, PF 5/5                     RIGHT LE - HF 0/5, KE 0/5, DF 0/5, PF 0/5        Coordination - FTN/HTS intact on the left; unable to assess due to weaknes son the right     Sensory – Impaired to LT  on right side;-- withdraws to pain Left LE but no response left UE     Reflexes - DTR  2/ 4 , neg Soria's b/l,  Tone: no increased tone    MSK: right shoulder PROM WNL, +sublux  Psychiatric - Mood stable, Affect WNL       RECENT LABS                        11.5   8.33  )-----------( 249      ( 21 Nov 2024 06:00 )             34.6     11-21    139  |  105  |  16  ----------------------------<  105[H]  3.9   |  30  |  0.94    Ca    8.8      21 Nov 2024 06:00    TPro  6.0  /  Alb  2.7[L]  /  TBili  0.6  /  DBili  x   /  AST  27  /  ALT  31  /  AlkPhos  59  11-21      Urinalysis Basic - ( 21 Nov 2024 06:00 )    Color: x / Appearance: x / SG: x / pH: x  Gluc: 105 mg/dL / Ketone: x  / Bili: x / Urobili: x   Blood: x / Protein: x / Nitrite: x   Leuk Esterase: x / RBC: x / WBC x   Sq Epi: x / Non Sq Epi: x / Bacteria: x          RADIOLOGY/OTHER RESULTS      MEDICATIONS  (STANDING):  albuterol    90 MICROgram(s) HFA Inhaler 2 Puff(s) Inhalation every 6 hours  amLODIPine   Tablet 5 milliGRAM(s) Oral daily  aspirin 325 milliGRAM(s) Oral daily  cholecalciferol 1000 Unit(s) Oral daily  clopidogrel Tablet 75 milliGRAM(s) Oral daily  colchicine 0.6 milliGRAM(s) Oral daily  enoxaparin Injectable 40 milliGRAM(s) SubCutaneous every 24 hours  losartan 50 milliGRAM(s) Oral daily  melatonin 9 milliGRAM(s) Oral at bedtime  nitrofurantoin monohydrate/macrocrystals (MACROBID) 100 milliGRAM(s) Oral two times a day  pantoprazole    Tablet 40 milliGRAM(s) Oral before breakfast  PARoxetine 30 milliGRAM(s) Oral daily  polyethylene glycol 3350 17 Gram(s) Oral two times a day  rosuvastatin 5 milliGRAM(s) Oral daily  senna 2 Tablet(s) Oral at bedtime  tamsulosin 0.4 milliGRAM(s) Oral at bedtime    MEDICATIONS  (PRN):  acetaminophen     Tablet .. 650 milliGRAM(s) Oral every 6 hours PRN Mild Pain (1 - 3)  atropine 1% Ophthalmic Solution for SubLingual Use 1 Drop(s) SubLingual every 8 hours PRN cough, and increased upper airway secretions  bisacodyl Suppository 10 milliGRAM(s) Rectal daily PRN Constipation  simethicone 80 milliGRAM(s) Chew four times a day PRN Dyspepsia         HPI:  70 y/o M w/ PMHx HLD, depression, GERD, gout, colonic polyps,  congenital absence of one kidney, recent cholecystitis s/p cholecystectomy (6/24), RHETT on CPAP orginially presented to [facility] on 11/3 with aphasia, R sided weakness and R facial droop and vigorous cough x 2 weeks s/p COVID vaccine. LKW 2045 on 11/2. 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 Patient transferred to [facility] for mechanical thrombectomy. S/p L ICA terminus occulusion a/p L ICA thrombectomy, no stent, 5 passes TICI 2C  w/ underling L petrous ICA dissection on 11/3 w/ Dr. March. Post op course c/b slight pseudoaneurysm enlargement w/ dissection distal embolisim into the terminal ICA seen on 11/4 CTA and hemorrhagic transformation of L MCA infarct.   Of note, hospital course also significant for fevers, suspect aspiration pneumonitis treated with Rocephin x 1, and dysphagia requiring easy to chew,  with mod thick liquids. EP was consulted with concerns of cardioembolic source for infarct, recommended to f/u outpatient for Zio patch.   Patient had an MBS on 11/12 which showed moderate oropharyngeal dysphagia, remarkable for delayed/reduced laryngeal vestibular closure, pharyngeal trigger delay and reduced hyolaryngeal elevation/excursion.  There was deep laryngeal penetration and possibly aspiration of thin liquid.  Patient was cleared for easy to chew GI diet with mildly thick liquids.    Patient optimized and was evaluated by PM&R and therapy for functional deficits, gait/ADL impairments and acute rehabilitation was recommended. Patient was cleared for discharge to [facility] IRU on 11/13/24.  (13 Nov 2024 15:44)          Subjective:  Seen this AM in OT.  Pt. became orthostatic with transfer.   His BP started at 106/69 and HR 69 with sitting, but with standing BP dropped to 94/64 and HR increased to 99.  Pt. was symptomatic.  No void this AM or last couple days.  Was SC for 600cc this AM.         VITALS  Vital Signs Last 24 Hrs  T(C): 36.7 (22 Nov 2024 07:31), Max: 36.8 (21 Nov 2024 16:35)  T(F): 98.1 (22 Nov 2024 07:31), Max: 98.2 (21 Nov 2024 16:35)  HR: 58 (22 Nov 2024 07:31) (55 - 61)  BP: 105/70 (22 Nov 2024 07:31) (105/70 - 122/77)  BP(mean): --  RR: 16 (22 Nov 2024 07:31) (16 - 16)  SpO2: 94% (22 Nov 2024 07:31) (93% - 94%)    Parameters below as of 22 Nov 2024 07:31  Patient On (Oxygen Delivery Method): nasal cannula  O2 Flow (L/min): 2      REVIEW OF SYMPTOMS  Neurological deficits      PHYSICAL EXAM  Constitutional - NAD, Comfortable   HEENT - NCAT, EOMI   Neck - Supple, No limited ROM  Chest - good chest expansion, good respiratory effort, CTAB    Cardio - warm and well perfused, RRR, no murmur   Abdomen -  Soft, NTND   Extremities - No peripheral edema, No calf tenderness      Neurologic Exam:                       Cognitive -              Orientation: Awake, Alert, Orientation limited due to aphasia            Attention:  limited due to aphasia; can follow simple 1 step commands; 2 step commands with delayed processing            Memory: unable to test due to aphasia     Speech – + Aphasia--Nonfluent, Impaired multiple step command following, but able to follow all simple commands, impaired repetition; + dysarthria,  able to phonate "aah" but did not phonate or verbalize any words.       Cranial Nerves – PERRLA , EOMI, + left facial weakness; facial sensation impaired on right, + dysarthria, + dysphagia, Shoulder shrug impaired        Motor -                        LEFT    UE - ShAB 5/5, EF 5/5, EE 5/5, WE 5/5,  5/5                     RIGHT UE - ShAB 0/5, EF 0/5, EE 0/5, WE 0/5,  0/5                     LEFT    LE - HF 5/5, KE 5/5, DF 5/5, PF 5/5                     RIGHT LE - HF 0/5, KE 0/5, DF 0/5, PF 0/5        Coordination - FTN/HTS intact on the left; unable to assess due to weaknes son the right     Sensory – Impaired to LT  on right side;-- withdraws to pain Left LE but no response left UE     Reflexes - DTR  2/ 4 , neg Soria's b/l,  Tone: no increased tone    MSK: right shoulder PROM WNL, +sublux  Psychiatric - Mood stable, Affect WNL       RECENT LABS                        11.5   8.33  )-----------( 249      ( 21 Nov 2024 06:00 )             34.6     11-21    139  |  105  |  16  ----------------------------<  105[H]  3.9   |  30  |  0.94    Ca    8.8      21 Nov 2024 06:00    TPro  6.0  /  Alb  2.7[L]  /  TBili  0.6  /  DBili  x   /  AST  27  /  ALT  31  /  AlkPhos  59  11-21      Urinalysis Basic - ( 21 Nov 2024 06:00 )    Color: x / Appearance: x / SG: x / pH: x  Gluc: 105 mg/dL / Ketone: x  / Bili: x / Urobili: x   Blood: x / Protein: x / Nitrite: x   Leuk Esterase: x / RBC: x / WBC x   Sq Epi: x / Non Sq Epi: x / Bacteria: x          RADIOLOGY/OTHER RESULTS   EXAM:  US DPLX LWR EXT VEINS COMPL BI   ORDERED BY:  SAAD DILL     PROCEDURE DATE:  11/21/2024          INTERPRETATION:  CLINICAL INFORMATION: monitor R soleal dvt    COMPARISON: 11/14/2024.    TECHNIQUE: Duplex sonography of the BILATERAL LOWER extremity veins with   color and spectral Doppler, with and without compression.    FINDINGS:    RIGHT:  Normal compressibility of the RIGHT common femoral, femoral and popliteal   veins.  Persistent right soleal vein DVT.    LEFT:  Normal compressibility of the LEFT common femoral, femoral and popliteal   veins.  Doppler examination shows normal spontaneous and phasic flow.  No LEFT calf vein thrombosis is detected.    IMPRESSION:  Persistent right soleal vein DVT.    MEDICATIONS  (STANDING):  albuterol    90 MICROgram(s) HFA Inhaler 2 Puff(s) Inhalation every 6 hours  amLODIPine   Tablet 5 milliGRAM(s) Oral daily  aspirin 325 milliGRAM(s) Oral daily  cholecalciferol 1000 Unit(s) Oral daily  clopidogrel Tablet 75 milliGRAM(s) Oral daily  colchicine 0.6 milliGRAM(s) Oral daily  enoxaparin Injectable 40 milliGRAM(s) SubCutaneous every 24 hours  losartan 50 milliGRAM(s) Oral daily  melatonin 9 milliGRAM(s) Oral at bedtime  nitrofurantoin monohydrate/macrocrystals (MACROBID) 100 milliGRAM(s) Oral two times a day  pantoprazole    Tablet 40 milliGRAM(s) Oral before breakfast  PARoxetine 30 milliGRAM(s) Oral daily  polyethylene glycol 3350 17 Gram(s) Oral two times a day  rosuvastatin 5 milliGRAM(s) Oral daily  senna 2 Tablet(s) Oral at bedtime  tamsulosin 0.4 milliGRAM(s) Oral at bedtime    MEDICATIONS  (PRN):  acetaminophen     Tablet .. 650 milliGRAM(s) Oral every 6 hours PRN Mild Pain (1 - 3)  atropine 1% Ophthalmic Solution for SubLingual Use 1 Drop(s) SubLingual every 8 hours PRN cough, and increased upper airway secretions  bisacodyl Suppository 10 milliGRAM(s) Rectal daily PRN Constipation  simethicone 80 milliGRAM(s) Chew four times a day PRN Dyspepsia

## 2024-11-22 NOTE — PROGRESS NOTE ADULT - SUBJECTIVE AND OBJECTIVE BOX
CC: Patient is a 69y old  Male who presents with a chief complaint of L MCA infarct w/ hemorrhagic conversion (22 Nov 2024 10:29)      Interval History:  Chart reviewed  Patient seen and examined  No major overnight events  Complaints this morning, repeated urinary retention,    Care Discussed with Consultants/Other Providers: Yes    ALLERGIES:  penicillin (Swelling)    MEDICATIONS  (STANDING):  albuterol    90 MICROgram(s) HFA Inhaler 2 Puff(s) Inhalation every 6 hours  aspirin 325 milliGRAM(s) Oral daily  cholecalciferol 1000 Unit(s) Oral daily  clopidogrel Tablet 75 milliGRAM(s) Oral daily  colchicine 0.6 milliGRAM(s) Oral daily  enoxaparin Injectable 40 milliGRAM(s) SubCutaneous every 24 hours  losartan 25 milliGRAM(s) Oral daily  melatonin 9 milliGRAM(s) Oral at bedtime  nitrofurantoin monohydrate/macrocrystals (MACROBID) 100 milliGRAM(s) Oral two times a day  pantoprazole    Tablet 40 milliGRAM(s) Oral before breakfast  PARoxetine 30 milliGRAM(s) Oral daily  polyethylene glycol 3350 17 Gram(s) Oral two times a day  rosuvastatin 5 milliGRAM(s) Oral daily  senna 2 Tablet(s) Oral at bedtime  tamsulosin 0.4 milliGRAM(s) Oral at bedtime    MEDICATIONS  (PRN):  acetaminophen     Tablet .. 650 milliGRAM(s) Oral every 6 hours PRN Mild Pain (1 - 3)  atropine 1% Ophthalmic Solution for SubLingual Use 1 Drop(s) SubLingual every 8 hours PRN cough, and increased upper airway secretions  bisacodyl Suppository 10 milliGRAM(s) Rectal daily PRN Constipation  simethicone 80 milliGRAM(s) Chew four times a day PRN Dyspepsia    Vital Signs Last 24 Hrs  T(F): 98.1 (22 Nov 2024 07:31), Max: 98.2 (21 Nov 2024 16:35)  HR: 58 (22 Nov 2024 07:31) (55 - 61)  BP: 105/70 (22 Nov 2024 07:31) (105/70 - 122/77)  RR: 16 (22 Nov 2024 07:31) (16 - 16)  SpO2: 94% (22 Nov 2024 07:31) (93% - 94%)  I&O's Summary    21 Nov 2024 07:01  -  22 Nov 2024 07:00  --------------------------------------------------------  IN: 0 mL / OUT: 1850 mL / NET: -1850 mL        PHYSICAL EXAM:  GENERAL: NAD  NERVOUS SYSTEM:  CN II - X grossly intact; Sensation intact; follows commands  CHEST/LUNG: Clear to percussion bilaterally; No rales, rhonchi, wheezing, or rubs; normal respiratory effort, no intercostal retractions  HEART: Regular rate and rhythm; No murmurs, rubs, or gallops; No pitting edema  ABDOMEN: Soft, Nontender, Nondistended; Bowel sounds present; No HSM or masses  MUSCULOSKELETAL/EXTREMITIES:  No clubbing or digital cyanosis  PSYCH: Appropriate affect, Alert    LABS:                        11.5   8.33  )-----------( 249      ( 21 Nov 2024 06:00 )             34.6       11-21    139  |  105  |  16  ----------------------------<  105  3.9   |  30  |  0.94    Ca    8.8      21 Nov 2024 06:00    TPro  6.0  /  Alb  2.7  /  TBili  0.6  /  DBili  x   /  AST  27  /  ALT  31  /  AlkPhos  59  11-21

## 2024-11-23 LAB
ANION GAP SERPL CALC-SCNC: 5 MMOL/L — SIGNIFICANT CHANGE UP (ref 5–17)
BASOPHILS # BLD AUTO: 0.03 K/UL — SIGNIFICANT CHANGE UP (ref 0–0.2)
BASOPHILS NFR BLD AUTO: 0.5 % — SIGNIFICANT CHANGE UP (ref 0–2)
BUN SERPL-MCNC: 20 MG/DL — SIGNIFICANT CHANGE UP (ref 7–23)
CALCIUM SERPL-MCNC: 8.9 MG/DL — SIGNIFICANT CHANGE UP (ref 8.4–10.5)
CHLORIDE SERPL-SCNC: 109 MMOL/L — HIGH (ref 96–108)
CO2 SERPL-SCNC: 32 MMOL/L — HIGH (ref 22–31)
CREAT SERPL-MCNC: 0.87 MG/DL — SIGNIFICANT CHANGE UP (ref 0.5–1.3)
EGFR: 92 ML/MIN/1.73M2 — SIGNIFICANT CHANGE UP
EOSINOPHIL # BLD AUTO: 0.22 K/UL — SIGNIFICANT CHANGE UP (ref 0–0.5)
EOSINOPHIL NFR BLD AUTO: 3.4 % — SIGNIFICANT CHANGE UP (ref 0–6)
GLUCOSE SERPL-MCNC: 109 MG/DL — HIGH (ref 70–99)
HCT VFR BLD CALC: 33.8 % — LOW (ref 39–50)
HGB BLD-MCNC: 11.5 G/DL — LOW (ref 13–17)
IMM GRANULOCYTES NFR BLD AUTO: 0.3 % — SIGNIFICANT CHANGE UP (ref 0–0.9)
LYMPHOCYTES # BLD AUTO: 1.77 K/UL — SIGNIFICANT CHANGE UP (ref 1–3.3)
LYMPHOCYTES # BLD AUTO: 27.7 % — SIGNIFICANT CHANGE UP (ref 13–44)
MCHC RBC-ENTMCNC: 31 PG — SIGNIFICANT CHANGE UP (ref 27–34)
MCHC RBC-ENTMCNC: 34 G/DL — SIGNIFICANT CHANGE UP (ref 32–36)
MCV RBC AUTO: 91.1 FL — SIGNIFICANT CHANGE UP (ref 80–100)
MONOCYTES # BLD AUTO: 0.48 K/UL — SIGNIFICANT CHANGE UP (ref 0–0.9)
MONOCYTES NFR BLD AUTO: 7.5 % — SIGNIFICANT CHANGE UP (ref 2–14)
NEUTROPHILS # BLD AUTO: 3.87 K/UL — SIGNIFICANT CHANGE UP (ref 1.8–7.4)
NEUTROPHILS NFR BLD AUTO: 60.6 % — SIGNIFICANT CHANGE UP (ref 43–77)
NRBC # BLD: 0 /100 WBCS — SIGNIFICANT CHANGE UP (ref 0–0)
PLATELET # BLD AUTO: 240 K/UL — SIGNIFICANT CHANGE UP (ref 150–400)
POTASSIUM SERPL-MCNC: 4.1 MMOL/L — SIGNIFICANT CHANGE UP (ref 3.5–5.3)
POTASSIUM SERPL-SCNC: 4.1 MMOL/L — SIGNIFICANT CHANGE UP (ref 3.5–5.3)
PSA FLD-MCNC: 7.63 NG/ML — HIGH (ref 0–4)
RBC # BLD: 3.71 M/UL — LOW (ref 4.2–5.8)
RBC # FLD: 12.4 % — SIGNIFICANT CHANGE UP (ref 10.3–14.5)
SODIUM SERPL-SCNC: 146 MMOL/L — HIGH (ref 135–145)
URATE SERPL-MCNC: 6.2 MG/DL — SIGNIFICANT CHANGE UP (ref 3.4–8.8)
WBC # BLD: 6.39 K/UL — SIGNIFICANT CHANGE UP (ref 3.8–10.5)
WBC # FLD AUTO: 6.39 K/UL — SIGNIFICANT CHANGE UP (ref 3.8–10.5)

## 2024-11-23 PROCEDURE — 99232 SBSQ HOSP IP/OBS MODERATE 35: CPT

## 2024-11-23 RX ADMIN — Medication 2 PUFF(S): at 21:58

## 2024-11-23 RX ADMIN — Medication 1000 UNIT(S): at 12:23

## 2024-11-23 RX ADMIN — NITROFURANTOIN 100 MILLIGRAM(S): 100 CAPSULE ORAL at 17:16

## 2024-11-23 RX ADMIN — Medication 325 MILLIGRAM(S): at 12:23

## 2024-11-23 RX ADMIN — PANTOPRAZOLE SODIUM 40 MILLIGRAM(S): 40 TABLET, DELAYED RELEASE ORAL at 06:49

## 2024-11-23 RX ADMIN — ROSUVASTATIN CALCIUM 5 MILLIGRAM(S): 5 TABLET, FILM COATED ORAL at 12:22

## 2024-11-23 RX ADMIN — PAROXETINE HYDROCHLORIDE HEMIHYDRATE 30 MILLIGRAM(S): 37.5 TABLET, FILM COATED, EXTENDED RELEASE ORAL at 12:22

## 2024-11-23 RX ADMIN — TAMSULOSIN HYDROCHLORIDE 0.4 MILLIGRAM(S): 0.4 CAPSULE ORAL at 21:39

## 2024-11-23 RX ADMIN — NITROFURANTOIN 100 MILLIGRAM(S): 100 CAPSULE ORAL at 06:48

## 2024-11-23 RX ADMIN — ACETAMINOPHEN, DIPHENHYDRAMINE HCL, PHENYLEPHRINE HCL 9 MILLIGRAM(S): 325; 25; 5 TABLET ORAL at 21:40

## 2024-11-23 RX ADMIN — POLYETHYLENE GLYCOL 3350 17 GRAM(S): 17 POWDER, FOR SOLUTION ORAL at 17:14

## 2024-11-23 RX ADMIN — ENOXAPARIN SODIUM 40 MILLIGRAM(S): 30 INJECTION SUBCUTANEOUS at 17:14

## 2024-11-23 RX ADMIN — Medication 2 PUFF(S): at 09:15

## 2024-11-23 RX ADMIN — CLOPIDOGREL 75 MILLIGRAM(S): 75 TABLET, FILM COATED ORAL at 12:23

## 2024-11-23 RX ADMIN — Medication 0.6 MILLIGRAM(S): at 12:22

## 2024-11-23 RX ADMIN — Medication 10 MILLIGRAM(S): at 10:58

## 2024-11-23 NOTE — PROGRESS NOTE ADULT - SUBJECTIVE AND OBJECTIVE BOX
CC: Patient is a 69y old  Male who presents with a chief complaint of L MCA infarct w/ hemorrhagic conversion     Interval History:  First encounter with pt:  Patient seen and examined at bedside.  No overnight events  No complaints this morning, denies supra-pubic pain or tenderness     ALLERGIES:  penicillin (Swelling)    MEDICATIONS  (STANDING):  albuterol    90 MICROgram(s) HFA Inhaler 2 Puff(s) Inhalation every 6 hours  aspirin 325 milliGRAM(s) Oral daily  cholecalciferol 1000 Unit(s) Oral daily  clopidogrel Tablet 75 milliGRAM(s) Oral daily  colchicine 0.6 milliGRAM(s) Oral daily  enoxaparin Injectable 40 milliGRAM(s) SubCutaneous every 24 hours  losartan 25 milliGRAM(s) Oral daily  melatonin 9 milliGRAM(s) Oral at bedtime  nitrofurantoin monohydrate/macrocrystals (MACROBID) 100 milliGRAM(s) Oral two times a day  pantoprazole    Tablet 40 milliGRAM(s) Oral before breakfast  PARoxetine 30 milliGRAM(s) Oral daily  polyethylene glycol 3350 17 Gram(s) Oral two times a day  rosuvastatin 5 milliGRAM(s) Oral daily  senna 2 Tablet(s) Oral at bedtime  tamsulosin 0.4 milliGRAM(s) Oral at bedtime    MEDICATIONS  (PRN):  acetaminophen     Tablet .. 650 milliGRAM(s) Oral every 6 hours PRN Mild Pain (1 - 3)  atropine 1% Ophthalmic Solution for SubLingual Use 1 Drop(s) SubLingual every 8 hours PRN cough, and increased upper airway secretions  bisacodyl Suppository 10 milliGRAM(s) Rectal daily PRN Constipation  simethicone 80 milliGRAM(s) Chew four times a day PRN Dyspepsia    Vital Signs Last 24 Hrs  T(F): 98.1 (23 Nov 2024 08:10), Max: 98.1 (23 Nov 2024 08:10)  HR: 60 (23 Nov 2024 09:15) (60 - 64)  BP: 109/65 (23 Nov 2024 08:10) (108/67 - 117/66)  RR: 14 (23 Nov 2024 08:10) (14 - 14)  SpO2: 95% (23 Nov 2024 09:15) (93% - 95%)  I&O's Summary    22 Nov 2024 07:01 - 23 Nov 2024 07:00  --------------------------------------------------------  IN: 0 mL / OUT: 1350 mL / NET: -1350 mL          PHYSICAL EXAM:  GENERAL: NAD  CHEST/LUNG: Clear to percussion bilaterally; No rales, rhonchi, wheezing, or rubs; normal respiratory effort, no intercostal retractions  HEART: Regular rate and rhythm; No murmurs, rubs, or gallops; No pitting edema  ABDOMEN: Soft, Nontender, Nondistended; Bowel sounds present; No HSM or masses  MUSCULOSKELETAL/EXTREMITIES:  2+ Peripheral Pulses, No clubbing or digital cyanosis; FROM of extremeties (pain, crepitation or contracture)  Brito catheter in place      LABS:                        11.5   6.39  )-----------( 240      ( 23 Nov 2024 05:35 )             33.8       11-23    146  |  109  |  20  ----------------------------<  109  4.1   |  32  |  0.87    Ca    8.9      23 Nov 2024 05:35    TPro  6.0  /  Alb  2.7  /  TBili  0.6  /  DBili  x   /  AST  27  /  ALT  31  /  AlkPhos  59  11-21            Urinalysis Basic - ( 23 Nov 2024 05:35 )    Color: x / Appearance: x / SG: x / pH: x  Gluc: 109 mg/dL / Ketone: x  / Bili: x / Urobili: x   Blood: x / Protein: x / Nitrite: x   Leuk Esterase: x / RBC: x / WBC x   Sq Epi: x / Non Sq Epi: x / Bacteria: x        Culture - Urine (collected 18 Nov 2024 10:20)  Source: Catheterized Catheterized  Final Report (20 Nov 2024 10:44):    10,000 - 49,000 CFU/mL Enterococcus faecalis  Organism: Enterococcus faecalis (20 Nov 2024 10:44)  Organism: Enterococcus faecalis (20 Nov 2024 10:44)      Method Type: ROB      -  Ampicillin: S <=2 Predicts results to ampicillin/sulbactam, amoxacillin-clavulanate and  piperacillin-tazobactam.      -  Ciprofloxacin: R >2      -  Levofloxacin: R >4      -  Nitrofurantoin: S <=32 Should not be used to treat pyelonephritis.      -  Tetracycline: R >8      -  Vancomycin: S 1      COVID-19 PCR: NotDetec (11-13-24 @ 22:33)      Care Discussed with Consultants/Other Providers: Yes

## 2024-11-23 NOTE — PROGRESS NOTE ADULT - SUBJECTIVE AND OBJECTIVE BOX
Cc: Gait dysfunction    HPI: Patient seen and examined at bedside. No acute events overnight.   Has been tolerating rehabilitation program.    acetaminophen     Tablet .. 650 milliGRAM(s) Oral every 6 hours PRN  albuterol    90 MICROgram(s) HFA Inhaler 2 Puff(s) Inhalation every 6 hours  aspirin 325 milliGRAM(s) Oral daily  atropine 1% Ophthalmic Solution for SubLingual Use 1 Drop(s) SubLingual every 8 hours PRN  bisacodyl Suppository 10 milliGRAM(s) Rectal daily PRN  cholecalciferol 1000 Unit(s) Oral daily  clopidogrel Tablet 75 milliGRAM(s) Oral daily  colchicine 0.6 milliGRAM(s) Oral daily  enoxaparin Injectable 40 milliGRAM(s) SubCutaneous every 24 hours  losartan 25 milliGRAM(s) Oral daily  melatonin 9 milliGRAM(s) Oral at bedtime  nitrofurantoin monohydrate/macrocrystals (MACROBID) 100 milliGRAM(s) Oral two times a day  pantoprazole    Tablet 40 milliGRAM(s) Oral before breakfast  PARoxetine 30 milliGRAM(s) Oral daily  polyethylene glycol 3350 17 Gram(s) Oral two times a day  rosuvastatin 5 milliGRAM(s) Oral daily  senna 2 Tablet(s) Oral at bedtime  simethicone 80 milliGRAM(s) Chew four times a day PRN  tamsulosin 0.4 milliGRAM(s) Oral at bedtime      T(C): 36.7 (11-23-24 @ 08:10), Max: 36.7 (11-23-24 @ 08:10)  HR: 60 (11-23-24 @ 09:15) (60 - 64)  BP: 109/65 (11-23-24 @ 08:10) (108/67 - 117/66)  RR: 14 (11-23-24 @ 08:10) (14 - 14)  SpO2: 95% (11-23-24 @ 09:15) (93% - 95%)    In NAD  HEENT- EOMI  Heart- S1S2  Lungs- CTA bl.  Abd- + BS, NT  Ext- No calf pain  Neuro- Exam unchanged    CBC 11/23/24 reviewed  BMP 11/23/24 reviewed  CMP 11/21/24 reviewed      Imp: Patient with diagnosis of L MCA CVA with hemorrhagic conversion admitted for comprehensive acute rehabilitation.    Plan:  - Continue PT/OT/SLP as indicated  - DVT prophylaxis - Continue Lovenox subQ  - Skin- Turn q2h, check skin daily  - Continue current medications  -Active issues-   DVT - On ASA/Plavix and Lovenox 40, weekly dopplers  CVA - Continue ASA/Plavix/Statin  - Patient is stable to continue current rehabilitation program.

## 2024-11-23 NOTE — PROGRESS NOTE ADULT - SUBJECTIVE AND OBJECTIVE BOX
Time of Visit:  Patient seen and examined. pat is lying in bed din assisted with lunch     MEDICATIONS  (STANDING):  albuterol    90 MICROgram(s) HFA Inhaler 2 Puff(s) Inhalation every 6 hours  aspirin 325 milliGRAM(s) Oral daily  cholecalciferol 1000 Unit(s) Oral daily  clopidogrel Tablet 75 milliGRAM(s) Oral daily  colchicine 0.6 milliGRAM(s) Oral daily  enoxaparin Injectable 40 milliGRAM(s) SubCutaneous every 24 hours  losartan 25 milliGRAM(s) Oral daily  melatonin 9 milliGRAM(s) Oral at bedtime  nitrofurantoin monohydrate/macrocrystals (MACROBID) 100 milliGRAM(s) Oral two times a day  pantoprazole    Tablet 40 milliGRAM(s) Oral before breakfast  PARoxetine 30 milliGRAM(s) Oral daily  polyethylene glycol 3350 17 Gram(s) Oral two times a day  rosuvastatin 5 milliGRAM(s) Oral daily  senna 2 Tablet(s) Oral at bedtime  tamsulosin 0.4 milliGRAM(s) Oral at bedtime      MEDICATIONS  (PRN):  acetaminophen     Tablet .. 650 milliGRAM(s) Oral every 6 hours PRN Mild Pain (1 - 3)  atropine 1% Ophthalmic Solution for SubLingual Use 1 Drop(s) SubLingual every 8 hours PRN cough, and increased upper airway secretions  bisacodyl Suppository 10 milliGRAM(s) Rectal daily PRN Constipation  simethicone 80 milliGRAM(s) Chew four times a day PRN Dyspepsia       Medications up to date at time of exam.      PHYSICAL EXAMINATION:  Patient has no new complaints.  GENERAL: The patient  in no apparent distress.     Vital Signs Last 24 Hrs  T(C): 36.7 (23 Nov 2024 08:10), Max: 36.7 (23 Nov 2024 08:10)  T(F): 98.1 (23 Nov 2024 08:10), Max: 98.1 (23 Nov 2024 08:10)  HR: 58 (23 Nov 2024 16:12) (58 - 64)  BP: 117/72 (23 Nov 2024 16:12) (108/67 - 117/72)  BP(mean): --  RR: 14 (23 Nov 2024 16:12) (14 - 14)  SpO2: 94% (23 Nov 2024 16:12) (93% - 95%)    Parameters below as of 23 Nov 2024 16:12  Patient On (Oxygen Delivery Method): nasal cannula  O2 Flow (L/min): 2     (if applicable)    Chest Tube (if applicable)    HEENT: Head is normocephalic and atraumatic. Extraocular muscles are intact. Mucous membranes are moist.     NECK: Supple, no palpable adenopathy.    LUNGS: Fair bilateral air entry   no wheezing, rales, or rhonchi.    HEART: Regular rate and rhythm without murmur.    ABDOMEN: Soft, nontender, and nondistended.  No hepatosplenomegaly is noted.    : No painful voiding, no pelvic pain    EXTREMITIES: Without any cyanosis, clubbing, rash, lesions or edema.    NEUROLOGIC: Awake,     SKIN: Warm, dry, good turgor.      LABS:                        11.5   6.39  )-----------( 240      ( 23 Nov 2024 05:35 )             33.8     11-23    146[H]  |  109[H]  |  20  ----------------------------<  109[H]  4.1   |  32[H]  |  0.87    Ca    8.9      23 Nov 2024 05:35        Urinalysis Basic - ( 23 Nov 2024 05:35 )    Color: x / Appearance: x / SG: x / pH: x  Gluc: 109 mg/dL / Ketone: x  / Bili: x / Urobili: x   Blood: x / Protein: x / Nitrite: x   Leuk Esterase: x / RBC: x / WBC x   Sq Epi: x / Non Sq Epi: x / Bacteria: x      MICROBIOLOGY: (if applicable)    RADIOLOGY & ADDITIONAL STUDIES:  EKG:   CXR:  ECHO:    IMPRESSION: 69y Male PAST MEDICAL & SURGICAL HISTORY:  GERD (gastroesophageal reflux disease)      Colon polyps      Major depression      Gout      Hypercholesteremia      RHETT on CPAP      S/P cholecystectomy      S/P cataract surgery      S/P skin cancer resection      H/O total hip arthroplasty       p/w       IMP:  This is a  69 yr old man with  HLD, depression, GERD, gout, colonic polyps,  congenital absence of one kidney, recent cholecystitis s/p cholecystectomy (6/24), RHETT on CPAP orginially presented to [facility] on 11/3 with aphasia, R sided weakness and R facial droop and vigorous cough x 2 weeks s/p COVID vaccine. 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  S/p L ICA terminus occulusion a/p L ICA thrombectomy, no stent, 5 passes TICI 2C  w/ underling L petrous ICA dissection on 11/3 w/ Dr. March. Now admitted to [facility] for functional deficits for initiation of a multidisciplinary rehab program.       Assessment  1. Hypoxemia suspect from atelectasis, RHETT and increased upper airway secretions  2. s/p L MCA CVA with hemorragic conversion  3. Obesity with RHETT  4. R soleal Vein DVT     Plan  - Pat uses PAP ( CPAP vs BiPAP) at home , please obtain setting   - Continue o2 supp as needed   - Asp precaution   - Atropine gtt for secretions   - If pat continue to cough , ENT eval   - continue DVT ppx dose Lovenox for R soleal vein dvt, repeat US LE 1 week to eval for propagation.

## 2024-11-24 PROCEDURE — 99232 SBSQ HOSP IP/OBS MODERATE 35: CPT

## 2024-11-24 RX ADMIN — Medication 2 PUFF(S): at 09:10

## 2024-11-24 RX ADMIN — ROSUVASTATIN CALCIUM 5 MILLIGRAM(S): 5 TABLET, FILM COATED ORAL at 11:39

## 2024-11-24 RX ADMIN — Medication 2 PUFF(S): at 15:53

## 2024-11-24 RX ADMIN — LOSARTAN POTASSIUM 25 MILLIGRAM(S): 100 TABLET, FILM COATED ORAL at 06:36

## 2024-11-24 RX ADMIN — ACETAMINOPHEN, DIPHENHYDRAMINE HCL, PHENYLEPHRINE HCL 9 MILLIGRAM(S): 325; 25; 5 TABLET ORAL at 21:39

## 2024-11-24 RX ADMIN — CLOPIDOGREL 75 MILLIGRAM(S): 75 TABLET, FILM COATED ORAL at 11:39

## 2024-11-24 RX ADMIN — Medication 325 MILLIGRAM(S): at 11:40

## 2024-11-24 RX ADMIN — NITROFURANTOIN 100 MILLIGRAM(S): 100 CAPSULE ORAL at 06:36

## 2024-11-24 RX ADMIN — PANTOPRAZOLE SODIUM 40 MILLIGRAM(S): 40 TABLET, DELAYED RELEASE ORAL at 06:36

## 2024-11-24 RX ADMIN — Medication 0.6 MILLIGRAM(S): at 11:39

## 2024-11-24 RX ADMIN — PAROXETINE HYDROCHLORIDE HEMIHYDRATE 30 MILLIGRAM(S): 37.5 TABLET, FILM COATED, EXTENDED RELEASE ORAL at 11:39

## 2024-11-24 RX ADMIN — Medication 1000 UNIT(S): at 13:00

## 2024-11-24 RX ADMIN — Medication 2 PUFF(S): at 21:57

## 2024-11-24 RX ADMIN — ENOXAPARIN SODIUM 40 MILLIGRAM(S): 30 INJECTION SUBCUTANEOUS at 17:31

## 2024-11-24 RX ADMIN — NITROFURANTOIN 100 MILLIGRAM(S): 100 CAPSULE ORAL at 17:31

## 2024-11-24 RX ADMIN — TAMSULOSIN HYDROCHLORIDE 0.4 MILLIGRAM(S): 0.4 CAPSULE ORAL at 21:38

## 2024-11-24 NOTE — PROGRESS NOTE ADULT - ASSESSMENT
TARUN CRESPO is a 70 y/o M w/ PMHx HLD, depression, GERD, gout, colonic polyps,  congenital absence of one kidney, recent cholecystitis s/p cholecystectomy (6/24), RHETT on CPAP orginially presented to [facility] on 11/3 with aphasia, R sided weakness and R facial droop and vigorous cough x 2 weeks s/p COVID vaccine. 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  S/p L ICA terminus occulusion a/p L ICA thrombectomy, no stent, 5 passes TICI 2C  w/ underling L petrous ICA dissection on 11/3 w/ Dr. March. Now admitted to [facility] for functional deficits for initiation of a multidisciplinary rehab program     #L MCA stroke w/ hemorrhagic conversion  #L petrous ICA dissection w/ pesudoaneurysm  -CTH 11/3: hyperdense appearance of L basal ganglia and L frontal lobe gyri, mild LCA edema 2/2 recent ischemia  -CTA H/N 11/3: no evidence of significant stenosis affecting the extra cranial carotid or vertebral arteries. No evidence of aneurysm or significant vascular stenosis at [place] of [place]  -Repeat CTH 11/4: post recent endovascular revascularization of the left internal carotid artery and left middle cerebral artery. Minimally less conspicuous hyperdense appearance of left basal ganglia and left frontal lobe gyri which may represent expected evolution of contrast staining  -MRI brain 11/5: evolving recent infarct in L ICA associated with hemorrhagic transformation of the L basal ganglia. Effacement of cerebral sulci with mass effect causing 0.6 mm rightward shift.   -CTH 11/9: evolving acute to subacute left MCA infarct with evolving left basal ganglia hemorrhagic transformation, new punctuate foci of increased hyperdensity. Stable mass effect and 5mm rightward midline shift  -CTA 11/9: stable left internal carotid artery pseudoaneurysm immediately inferior to the petrous portion of the L ICA at the area of focal dissection.  -S/p L ICA terminus occulusion a/p L ICA thrombectomy, no stent, 5 passes TICI 2C  w/ underling L petrous ICA dissection on 11/3 w/ Dr. March.   -A1c 5.0; LDL 60  -Aspirin 325mg daily  -Plavix 75mg daily   rosuvastatin 5mg QD  -Will be on DAPT for 3 months and follow up with outpt neurology  -F/u outpatient caridology for Zio-patch to r/o cardioembolic source --ILR to be considered as outpatient    #Urinary Retention, repeated > 500cc, new order for jerome, initially deferred  - flomax  - treat constipation aggressively   - Already on Macrobid since 11/20 for UTI ( E. Faecalis ), afebrile, no leukocytosis on labs, will monitor         # Normocytic anemia (stable)   -Monitor H/H (12.2/37.2- on 11/14)  -F/u outpatient colonoscopy for hx of colon polps  -Transfuse <7 PRN    -Iron studies: Transferrin: 11, TIBC: 170, Iron: 18, Ferritin: 414    # Primary HTN   c/w Losartan      #RHETT  -CXR- negative; repeat ordered per pulm this AM, will f/u result  -CPAP HS --unknown settings will need to confirm with wife  -Albuterol PRN  -On Flovent at home (not available here)  - supplemental O2 PRN, taper to 1 L and maintain sat > 90%    #Gout  -Colchicine 0.6mg daily  -Uric acid 11/12- 6.8     # R Soleal Vein DVT  - Duplex U/S showed DVT below the knee, R soleal vein thrombosis 11/14  - Aspirin 325 mg daily  - Plavix 75 mg daily  - Lovenox 40 mg SubQ daily 11/15  --f/u weekly dopplers    #Depression  -Paxil 30mg daily     # Hypernatremia  Na 139 > 146, repeat labs and monitor

## 2024-11-24 NOTE — PROGRESS NOTE ADULT - SUBJECTIVE AND OBJECTIVE BOX
CC: Patient is a 69y old  Male who presents with a chief complaint of L MCA infarct w/ hemorrhagic conversion     Interval History:  Patient seen and examined at bedside.  No overnight events  No complaints this morning  Vitally stable    ALLERGIES:  penicillin (Swelling)    MEDICATIONS  (STANDING):  albuterol    90 MICROgram(s) HFA Inhaler 2 Puff(s) Inhalation every 6 hours  aspirin 325 milliGRAM(s) Oral daily  cholecalciferol 1000 Unit(s) Oral daily  clopidogrel Tablet 75 milliGRAM(s) Oral daily  colchicine 0.6 milliGRAM(s) Oral daily  enoxaparin Injectable 40 milliGRAM(s) SubCutaneous every 24 hours  losartan 25 milliGRAM(s) Oral daily  melatonin 9 milliGRAM(s) Oral at bedtime  nitrofurantoin monohydrate/macrocrystals (MACROBID) 100 milliGRAM(s) Oral two times a day  pantoprazole    Tablet 40 milliGRAM(s) Oral before breakfast  PARoxetine 30 milliGRAM(s) Oral daily  polyethylene glycol 3350 17 Gram(s) Oral two times a day  rosuvastatin 5 milliGRAM(s) Oral daily  senna 2 Tablet(s) Oral at bedtime  tamsulosin 0.4 milliGRAM(s) Oral at bedtime    MEDICATIONS  (PRN):  acetaminophen     Tablet .. 650 milliGRAM(s) Oral every 6 hours PRN Mild Pain (1 - 3)  atropine 1% Ophthalmic Solution for SubLingual Use 1 Drop(s) SubLingual every 8 hours PRN cough, and increased upper airway secretions  bisacodyl Suppository 10 milliGRAM(s) Rectal daily PRN Constipation  simethicone 80 milliGRAM(s) Chew four times a day PRN Dyspepsia    Vital Signs Last 24 Hrs  T(F): 97.8 (24 Nov 2024 07:46), Max: 97.8 (24 Nov 2024 07:46)  HR: 57 (24 Nov 2024 07:46) (57 - 61)  BP: 129/81 (24 Nov 2024 07:46) (115/71 - 129/81)  RR: 16 (24 Nov 2024 07:46) (14 - 16)  SpO2: 94% (24 Nov 2024 07:46) (94% - 96%)  I&O's Summary    23 Nov 2024 07:01  -  24 Nov 2024 07:00  --------------------------------------------------------  IN: 0 mL / OUT: 900 mL / NET: -900 mL          PHYSICAL EXAM:  GENERAL: NAD  CHEST/LUNG: Clear to percussion bilaterally; No rales, rhonchi, wheezing, or rubs; normal respiratory effort, no intercostal retractions  HEART: Regular rate and rhythm; No murmurs, rubs, or gallops; No pitting edema  ABDOMEN: Soft, Nontender, Nondistended; Bowel sounds present; No HSM or masses  MUSCULOSKELETAL/EXTREMITIES:  2+ Peripheral Pulses, No clubbing or digital cyanosis; FROM of extremeties (pain, crepitation or contracture)    LABS:                        11.5   6.39  )-----------( 240      ( 23 Nov 2024 05:35 )             33.8       11-23    146  |  109  |  20  ----------------------------<  109  4.1   |  32  |  0.87    Ca    8.9      23 Nov 2024 05:35            Urinalysis Basic - ( 23 Nov 2024 05:35 )    Color: x / Appearance: x / SG: x / pH: x  Gluc: 109 mg/dL / Ketone: x  / Bili: x / Urobili: x   Blood: x / Protein: x / Nitrite: x   Leuk Esterase: x / RBC: x / WBC x   Sq Epi: x / Non Sq Epi: x / Bacteria: x        Culture - Urine (collected 18 Nov 2024 10:20)  Source: Catheterized Catheterized  Final Report (20 Nov 2024 10:44):    10,000 - 49,000 CFU/mL Enterococcus faecalis  Organism: Enterococcus faecalis (20 Nov 2024 10:44)  Organism: Enterococcus faecalis (20 Nov 2024 10:44)      Method Type: ROB      -  Ampicillin: S <=2 Predicts results to ampicillin/sulbactam, amoxacillin-clavulanate and  piperacillin-tazobactam.      -  Ciprofloxacin: R >2      -  Levofloxacin: R >4      -  Nitrofurantoin: S <=32 Should not be used to treat pyelonephritis.      -  Tetracycline: R >8      -  Vancomycin: S 1      COVID-19 PCR: NotDetec (11-13-24 @ 22:33)      Care Discussed with Consultants/Other Providers: Yes

## 2024-11-24 NOTE — PROGRESS NOTE ADULT - ASSESSMENT
Physical Examination:  GENERAL:               Alert, Oriented, No acute distress.    HEENT:                    Right facial droop No JVD, dry MM  PULM:                     Bilateral air entry, transmitted sounds to auscultation bilaterally, no significant sputum production, no Rales, No Rhonchi, No Wheezing  CVS:                         S1, S2,  No Murmur  ABD:                        Soft, nondistended, nontender, normoactive bowel sounds,   EXT:                         + edema, nontender, no Cyanosis or Clubbing    NEURO:                  Alert, oriented, interactive, RHP , follows commands, aphasic  PSYC:                      Calm, suspected Insight.          Assessment  1. Hypoxemia suspect from atelectasis, RHETT and increased upper airway secretions  2. s/p L MCA CVA with hemorragic conversion  3. Obesity with RHETT  4. R soleal Vein DVT     Plan  n/c o2 to maintain sat maintain sat > 90%    currently on 2 L  start incentive spirometry  cough and secretions less    CPAP use as per patient desire, as unable to pull off mask, may benefit from only n/c o2 QHS  aspiration precautions  Pulmonary toilet as needed  will follow    continue DVT ppx dose Lovenox for R soleal vein dvt, repeat US LE 1 - repeat us remain stable

## 2024-11-24 NOTE — PROGRESS NOTE ADULT - SUBJECTIVE AND OBJECTIVE BOX
Follow-up Pulmonary Progress Note  Chief Complaint : Cerebral infarction    patient on n/c o2   sat 92%  no cp, sob, palp, n/v  not using CPAP qus  denies cough/upper airway secretions.      Allergies :penicillin (Swelling)      PAST MEDICAL & SURGICAL HISTORY:  GERD (gastroesophageal reflux disease)  Colon polyps  Major depression  Gout  Hypercholesteremia  RHETT on CPAP  S/P cholecystectomy  S/P cataract surgery  S/P skin cancer resection  H/O total hip arthroplasty        Medications:  MEDICATIONS  (STANDING):  albuterol    90 MICROgram(s) HFA Inhaler 2 Puff(s) Inhalation every 6 hours  aspirin 325 milliGRAM(s) Oral daily  cholecalciferol 1000 Unit(s) Oral daily  clopidogrel Tablet 75 milliGRAM(s) Oral daily  colchicine 0.6 milliGRAM(s) Oral daily  enoxaparin Injectable 40 milliGRAM(s) SubCutaneous every 24 hours  losartan 25 milliGRAM(s) Oral daily  melatonin 9 milliGRAM(s) Oral at bedtime  nitrofurantoin monohydrate/macrocrystals (MACROBID) 100 milliGRAM(s) Oral two times a day  pantoprazole    Tablet 40 milliGRAM(s) Oral before breakfast  PARoxetine 30 milliGRAM(s) Oral daily  polyethylene glycol 3350 17 Gram(s) Oral two times a day  rosuvastatin 5 milliGRAM(s) Oral daily  senna 2 Tablet(s) Oral at bedtime  tamsulosin 0.4 milliGRAM(s) Oral at bedtime    MEDICATIONS  (PRN):  acetaminophen     Tablet .. 650 milliGRAM(s) Oral every 6 hours PRN Mild Pain (1 - 3)  atropine 1% Ophthalmic Solution for SubLingual Use 1 Drop(s) SubLingual every 8 hours PRN cough, and increased upper airway secretions  bisacodyl Suppository 10 milliGRAM(s) Rectal daily PRN Constipation  simethicone 80 milliGRAM(s) Chew four times a day PRN Dyspepsia      Antibiotics History  ciprofloxacin     Tablet 500 milliGRAM(s) Oral every 12 hours, 11-18-24 @ 11:33, Stop order after: 5 Days  nitrofurantoin monohydrate/macrocrystals (MACROBID) 100 milliGRAM(s) Oral two times a day, 11-20-24 @ 11:49, Stop order after: 7 Days      Heme Medications   clopidogrel Tablet 75 milliGRAM(s) Oral daily, 11-14-24 @ 00:00  enoxaparin Injectable 40 milliGRAM(s) SubCutaneous every 24 hours, 11-15-24 @ 12:31      GI Medications  bisacodyl Suppository 10 milliGRAM(s) Rectal daily, 11-13-24 @ 21:43, Routine PRN  pantoprazole    Tablet 40 milliGRAM(s) Oral before breakfast, 11-13-24 @ 21:44, Routine  polyethylene glycol 3350 17 Gram(s) Oral two times a day, 11-13-24 @ 21:45, Routine  senna 2 Tablet(s) Oral at bedtime, 11-19-24 @ 13:55,   simethicone 80 milliGRAM(s) Chew four times a day, 11-21-24 @ 12:52, Now PRN        LABS:                        11.5   6.39  )-----------( 240      ( 23 Nov 2024 05:35 )             33.8     11-23    146[H]  |  109[H]  |  20  ----------------------------<  109[H]  4.1   |  32[H]  |  0.87    Ca    8.9      23 Nov 2024 05:35         CULTURES: (if applicable)    Culture - Urine (collected 11-18-24 @ 10:20)  Source: Catheterized Catheterized  Final Report (11-20-24 @ 10:44):    10,000 - 49,000 CFU/mL Enterococcus faecalis  Organism: Enterococcus faecalis (11-20-24 @ 10:44)  Organism: Enterococcus faecalis (11-20-24 @ 10:44)      Method Type: ROB      -  Ampicillin: S <=2 Predicts results to ampicillin/sulbactam, amoxacillin-clavulanate and  piperacillin-tazobactam.      -  Ciprofloxacin: R >2      -  Levofloxacin: R >4      -  Nitrofurantoin: S <=32 Should not be used to treat pyelonephritis.      -  Tetracycline: R >8      -  Vancomycin: S 1      RADIOLOGY  CXR:      CT:    ECHO:    US  < from: US Duplex Venous Lower Ext Complete, Bilateral (11.21.24 @ 16:14) >    IMPRESSION:  Persistent right soleal vein DVT.    < end of copied text >      VITALS:  T(C): 36.6 (11-24-24 @ 07:46), Max: 36.6 (11-24-24 @ 07:46)  T(F): 97.8 (11-24-24 @ 07:46), Max: 97.8 (11-24-24 @ 07:46)  HR: 57 (11-24-24 @ 07:46) (57 - 61)  BP: 129/81 (11-24-24 @ 07:46) (115/71 - 129/81)  BP(mean): --  ABP: --  ABP(mean): --  RR: 16 (11-24-24 @ 07:46) (14 - 16)  SpO2: 94% (11-24-24 @ 07:46) (94% - 96%)  CVP(mm Hg): --  CVP(cm H2O): --    Ins and Outs     11-23-24 @ 07:01  -  11-24-24 @ 07:00  --------------------------------------------------------  IN: 0 mL / OUT: 900 mL / NET: -900 mL                I&O's Detail    23 Nov 2024 07:01  -  24 Nov 2024 07:00  --------------------------------------------------------  IN:  Total IN: 0 mL    OUT:    Indwelling Catheter - Urethral (mL): 900 mL  Total OUT: 900 mL    Total NET: -900 mL

## 2024-11-24 NOTE — PROGRESS NOTE ADULT - SUBJECTIVE AND OBJECTIVE BOX
Cc: Gait dysfunction    HPI: Patient seen and examined at bedside. No acute events overnight.   Has been tolerating rehabilitation program.    acetaminophen     Tablet .. 650 milliGRAM(s) Oral every 6 hours PRN  albuterol    90 MICROgram(s) HFA Inhaler 2 Puff(s) Inhalation every 6 hours  aspirin 325 milliGRAM(s) Oral daily  atropine 1% Ophthalmic Solution for SubLingual Use 1 Drop(s) SubLingual every 8 hours PRN  bisacodyl Suppository 10 milliGRAM(s) Rectal daily PRN  cholecalciferol 1000 Unit(s) Oral daily  clopidogrel Tablet 75 milliGRAM(s) Oral daily  colchicine 0.6 milliGRAM(s) Oral daily  enoxaparin Injectable 40 milliGRAM(s) SubCutaneous every 24 hours  losartan 25 milliGRAM(s) Oral daily  melatonin 9 milliGRAM(s) Oral at bedtime  nitrofurantoin monohydrate/macrocrystals (MACROBID) 100 milliGRAM(s) Oral two times a day  pantoprazole    Tablet 40 milliGRAM(s) Oral before breakfast  PARoxetine 30 milliGRAM(s) Oral daily  polyethylene glycol 3350 17 Gram(s) Oral two times a day  rosuvastatin 5 milliGRAM(s) Oral daily  senna 2 Tablet(s) Oral at bedtime  simethicone 80 milliGRAM(s) Chew four times a day PRN  tamsulosin 0.4 milliGRAM(s) Oral at bedtime      T(C): 36.6 (11-24-24 @ 07:46), Max: 36.6 (11-24-24 @ 07:46)  HR: 57 (11-24-24 @ 07:46) (57 - 61)  BP: 129/81 (11-24-24 @ 07:46) (115/71 - 129/81)  RR: 16 (11-24-24 @ 07:46) (14 - 16)  SpO2: 94% (11-24-24 @ 07:46) (94% - 96%)    In NAD  HEENT- EOMI  Heart- S1S2  Lungs- CTA bl.  Abd- + BS, NT  Ext- No calf pain  Neuro- Exam unchanged    CT Head 11/14/24 reviewed and interpreted by me: L MCA infarct seen    ACC: [id number] EXAM: CT BRAIN ORDERED BY: SAAD DILL    PROCEDURE DATE: 11/14/2024        INTERPRETATION: CLINICAL INFORMATION: DVT, monitor Hem to start lovenox    COMPARISON: None.    CONTRAST:  IV Contrast: NONE      TECHNIQUE: Serial axial images were obtained from the skull base to the vertex using multi-slice helical technique. Sagittal and coronal reformats were obtained.    FINDINGS:    VENTRICLES AND SULCI: Mass effect on the left lateral ventricle. Midline shift to the right roughly 4 mm  INTRA-AXIAL: Evolving left MCA territory infarct with some subtle gyral hemorrhage suggested along the superior margin of the infarct. Effacement of the basal ganglia region..  If EXTRA-AXIAL: No mass or fluid collection. Basal cisterns are normal in appearance.    VISUALIZED SINUSES: Clear.  TYMPANOMASTOID CAVITIES: Clear.  VISUALIZED ORBITS: Normal.  CALVARIUM: Intact.    MISCELLANEOUS: None.      IMPRESSION:  Evolving left MCA infarct with subtle mass effect on the left lateral ventricle and midline shift to the right roughly 4 mm. Some subtle gyral hemorrhage is suggested along the superior margin of the infarct. Effacement of the deep gray structures on the left at the level of the basal ganglia consistent with evolving infarct.        --- End of Report ---            VAN MOSS MD; Attending Radiologist  This document has been electronically signed. Nov 14 2024 9:12PM      Imp: Patient with diagnosis of L MCA CVA with hemorrhagic conversion admitted for comprehensive acute rehabilitation.    Plan:  - Continue PT/OT/SLP as indicated  - DVT prophylaxis - Continue Lovenox subQ  - Skin- Turn q2h, check skin daily  - Continue current medications  -Active issues-   DVT - On ASA/Plavix and Lovenox 40, weekly dopplers  CVA - Continue ASA/Plavix/Statin  - Patient is stable to continue current rehabilitation program.

## 2024-11-25 LAB
ALBUMIN SERPL ELPH-MCNC: 2.6 G/DL — LOW (ref 3.3–5)
ALP SERPL-CCNC: 56 U/L — SIGNIFICANT CHANGE UP (ref 40–120)
ALT FLD-CCNC: 24 U/L — SIGNIFICANT CHANGE UP (ref 10–45)
ANION GAP SERPL CALC-SCNC: 5 MMOL/L — SIGNIFICANT CHANGE UP (ref 5–17)
AST SERPL-CCNC: 23 U/L — SIGNIFICANT CHANGE UP (ref 10–40)
BASOPHILS # BLD AUTO: 0.03 K/UL — SIGNIFICANT CHANGE UP (ref 0–0.2)
BASOPHILS NFR BLD AUTO: 0.4 % — SIGNIFICANT CHANGE UP (ref 0–2)
BILIRUB SERPL-MCNC: 0.5 MG/DL — SIGNIFICANT CHANGE UP (ref 0.2–1.2)
BUN SERPL-MCNC: 15 MG/DL — SIGNIFICANT CHANGE UP (ref 7–23)
CALCIUM SERPL-MCNC: 8.9 MG/DL — SIGNIFICANT CHANGE UP (ref 8.4–10.5)
CHLORIDE SERPL-SCNC: 106 MMOL/L — SIGNIFICANT CHANGE UP (ref 96–108)
CO2 SERPL-SCNC: 31 MMOL/L — SIGNIFICANT CHANGE UP (ref 22–31)
CREAT SERPL-MCNC: 0.95 MG/DL — SIGNIFICANT CHANGE UP (ref 0.5–1.3)
EGFR: 86 ML/MIN/1.73M2 — SIGNIFICANT CHANGE UP
EOSINOPHIL # BLD AUTO: 0.25 K/UL — SIGNIFICANT CHANGE UP (ref 0–0.5)
EOSINOPHIL NFR BLD AUTO: 3.7 % — SIGNIFICANT CHANGE UP (ref 0–6)
GLUCOSE SERPL-MCNC: 102 MG/DL — HIGH (ref 70–99)
HCT VFR BLD CALC: 34.6 % — LOW (ref 39–50)
HGB BLD-MCNC: 11.6 G/DL — LOW (ref 13–17)
IMM GRANULOCYTES NFR BLD AUTO: 0.1 % — SIGNIFICANT CHANGE UP (ref 0–0.9)
LYMPHOCYTES # BLD AUTO: 1.65 K/UL — SIGNIFICANT CHANGE UP (ref 1–3.3)
LYMPHOCYTES # BLD AUTO: 24.7 % — SIGNIFICANT CHANGE UP (ref 13–44)
MCHC RBC-ENTMCNC: 31.1 PG — SIGNIFICANT CHANGE UP (ref 27–34)
MCHC RBC-ENTMCNC: 33.5 G/DL — SIGNIFICANT CHANGE UP (ref 32–36)
MCV RBC AUTO: 92.8 FL — SIGNIFICANT CHANGE UP (ref 80–100)
MONOCYTES # BLD AUTO: 0.5 K/UL — SIGNIFICANT CHANGE UP (ref 0–0.9)
MONOCYTES NFR BLD AUTO: 7.5 % — SIGNIFICANT CHANGE UP (ref 2–14)
NEUTROPHILS # BLD AUTO: 4.23 K/UL — SIGNIFICANT CHANGE UP (ref 1.8–7.4)
NEUTROPHILS NFR BLD AUTO: 63.6 % — SIGNIFICANT CHANGE UP (ref 43–77)
NRBC # BLD: 0 /100 WBCS — SIGNIFICANT CHANGE UP (ref 0–0)
PLATELET # BLD AUTO: 222 K/UL — SIGNIFICANT CHANGE UP (ref 150–400)
POTASSIUM SERPL-MCNC: 4 MMOL/L — SIGNIFICANT CHANGE UP (ref 3.5–5.3)
POTASSIUM SERPL-SCNC: 4 MMOL/L — SIGNIFICANT CHANGE UP (ref 3.5–5.3)
PROT SERPL-MCNC: 6 G/DL — SIGNIFICANT CHANGE UP (ref 6–8.3)
RBC # BLD: 3.73 M/UL — LOW (ref 4.2–5.8)
RBC # FLD: 12.4 % — SIGNIFICANT CHANGE UP (ref 10.3–14.5)
SODIUM SERPL-SCNC: 142 MMOL/L — SIGNIFICANT CHANGE UP (ref 135–145)
WBC # BLD: 6.67 K/UL — SIGNIFICANT CHANGE UP (ref 3.8–10.5)
WBC # FLD AUTO: 6.67 K/UL — SIGNIFICANT CHANGE UP (ref 3.8–10.5)

## 2024-11-25 PROCEDURE — 99232 SBSQ HOSP IP/OBS MODERATE 35: CPT

## 2024-11-25 RX ORDER — POLYETHYLENE GLYCOL 3350 17 G/17G
17 POWDER, FOR SOLUTION ORAL DAILY
Refills: 0 | Status: DISCONTINUED | OUTPATIENT
Start: 2024-11-25 | End: 2024-11-29

## 2024-11-25 RX ADMIN — CLOPIDOGREL 75 MILLIGRAM(S): 75 TABLET, FILM COATED ORAL at 11:10

## 2024-11-25 RX ADMIN — Medication 2 PUFF(S): at 22:05

## 2024-11-25 RX ADMIN — TAMSULOSIN HYDROCHLORIDE 0.4 MILLIGRAM(S): 0.4 CAPSULE ORAL at 21:15

## 2024-11-25 RX ADMIN — NITROFURANTOIN 100 MILLIGRAM(S): 100 CAPSULE ORAL at 05:37

## 2024-11-25 RX ADMIN — ENOXAPARIN SODIUM 40 MILLIGRAM(S): 30 INJECTION SUBCUTANEOUS at 17:50

## 2024-11-25 RX ADMIN — Medication 0.6 MILLIGRAM(S): at 11:10

## 2024-11-25 RX ADMIN — ROSUVASTATIN CALCIUM 5 MILLIGRAM(S): 5 TABLET, FILM COATED ORAL at 11:11

## 2024-11-25 RX ADMIN — Medication 1000 UNIT(S): at 11:10

## 2024-11-25 RX ADMIN — ACETAMINOPHEN, DIPHENHYDRAMINE HCL, PHENYLEPHRINE HCL 9 MILLIGRAM(S): 325; 25; 5 TABLET ORAL at 21:15

## 2024-11-25 RX ADMIN — Medication 325 MILLIGRAM(S): at 11:10

## 2024-11-25 RX ADMIN — PANTOPRAZOLE SODIUM 40 MILLIGRAM(S): 40 TABLET, DELAYED RELEASE ORAL at 05:37

## 2024-11-25 RX ADMIN — NITROFURANTOIN 100 MILLIGRAM(S): 100 CAPSULE ORAL at 17:52

## 2024-11-25 RX ADMIN — Medication 80 MILLIGRAM(S): at 13:30

## 2024-11-25 RX ADMIN — PAROXETINE HYDROCHLORIDE HEMIHYDRATE 30 MILLIGRAM(S): 37.5 TABLET, FILM COATED, EXTENDED RELEASE ORAL at 11:11

## 2024-11-25 NOTE — PROGRESS NOTE ADULT - ASSESSMENT
70 y/o M w/ PMHx HLD, depression, GERD, gout, colonic polyps,  congenital absence of one kidney, recent cholecystitis s/p cholecystectomy (6/24), RHETT on CPAP orginially presented to [facility] on 11/3/24 with 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  S/p L ICA terminus occulusion a/p L ICA thrombectomy,  w/ underling L petrous ICA dissection     # L MCA stroke w/ hemorrhagic conversion  # L petrous ICA dissection w/ pesudoaneurysm  - CTH 11/9: evolving acute to subacute left MCA infarct with evolving left basal ganglia hemorrhagic transformation, new punctuate foci of increased hyperdensity. Stable mass effect and 5mm rightward midline shift  - Aspirin 325mg daily  - Plavix 75mg daily   - Rosuvastatin 5mg QD  - outpatient cardiology f/u for w/u r/o cardioembolic source  - continue comprehensive rehab program, 3 hours a day, 5 days a week.    HTN---Orthostatic Hypotension  --Stopped Amlodipine  --decreased losartan to 25mg daily  --Monitor BP  --check orthostatics.     # R Soleal Vein DVT  - Duplex U/S showed DVT below the knee, R soleal vein thrombosis 11/14  - Lovenox 40 mg SubQ daily 11/15  - f/u weekly dopplers, last doppler 11/21 shows Persistent right soleal vein DVT.        #Urinary Retention, unimproved  -continue bladder scans  -UA without gross contamination, but per hospitalist will start cipro 11/18 empirically and send urine culture.  UCx resulted with cipro resistance, so macrobid started. Last day 11/27.   -bowels reg.   -flomax 0.4 initiated daily 11/18  -no void throughout this week-- has been requiring SC   --jerome Catheter ordered  --d/w hospitalist  --Hospitalist spoke with pt's wife regarding need for jerome  --Consider TOV next week.     UTI--  -- u/a +  -- on cipro, discontinued 11/20  - 11/20 started macrobid due to sensitivities. Last day 11/27  -- urine Cx +Enteroccocus  --may likely be contributing to  retention      # Normocytic anemia (stable) vs Iron deficiency anemia   - F/u outpatient colonoscopy for hx of colon polps  - Transfuse <7 PRN    - Iron studies: Transferrin: 11, TIBC: 170, Iron: 18, Ferritin: 414  - monitor H/H. Hgb 12.0 11/18 stable  - CBC 11/21--Hb stable 11.5    # RHETT  # Activity-Induced Hypoxia  - CXR- negative   - CPAP HS 5 50% O2. Poor compliance  - O2 via NC PRN  - Albuterol PRN    # Gout  - Colchicine 0.6mg daily  - Uric acid 11/12- 6.8     # Mood/Cognition  # Depression  - Paxil 30mg daily     # Sleep:   -cont. melatonin 9 qhs     # Pain Management:  - Tylenol PRN    # GI/Bowel:  - Senna QHS  - Miralax BIDD  - Bisacodyl suppository QD PRN  - simethicone for gas pain  - GI ppx: Pantoprazole 40mg daily   - constipation with abdominal discomfort: will give dulcolax suppository. If persists, AXR  - per SLP, will initiate free water protocol   - continue with aggressive bowel regimen to assist with urinary retention    # Dysphagia:  - S/p MBS on 11/12--> diet: Easy to chew; mild thick liquids- DASH  - 1:1 feeds    # LABS  CBC CMP 11/25   monitor BM    Letter for cruise ship cancellation due to patient's injury.     IDT 11/19  Omero  RN - incontinent of bowel, requiring SC  SW - condo with wife, 3-4 arely. elevator to 7th floor.  Independent prior, driving and working.  Wife is retired, main support and she drives.  SLP - easy to chew with mildly thick liquids.  Severe apraxia, aphasia, dysarthria. He can approximate single words with multiple cues.  He has picture board for AAC.  On free water protocol.  Repeat MBS end of this week or beginning of next week.  Suspect cog deficits but difficult to determine due to apraxia and dysarthria, and aphasia.  OT - supervision with eating, jose l for oral hygiene, maxA for bathing, UBD, and totA for toileting, LBD, commode transfer.  No shower transfer yet.  Today, mod2 for squat pivot, no active movement UE.  Therapy requesting max time.  Goals are jose l at WC level.  PT - bed mob maxA; transfer totA; non ambulatory.  Goal is Jose L  Goal 1 - use multimodalities to communicate basic wants and needs with staff max cues.     Goal 2 - transfer OOB with minimal assist  barriers - communication, hemiplegia, sensory impairment, aphasia, poor endurance.    admit GC 11/13.    CMG is 12/5.  DC 12/5 to [facility]            ---------------  Code Status: FULL  Emergency Contact:    Outpatient Follow-up (Specialty/Name of physician):  Dr. Cheng March  Neurology  12/24/24 appt     Cardiology    ENT  [phone]

## 2024-11-25 NOTE — PROGRESS NOTE ADULT - SUBJECTIVE AND OBJECTIVE BOX
HPI:  68 y/o M w/ PMHx HLD, depression, GERD, gout, colonic polyps,  congenital absence of one kidney, recent cholecystitis s/p cholecystectomy (6/24), RHETT on CPAP orginially presented to [facility] on 11/3 with aphasia, R sided weakness and R facial droop and vigorous cough x 2 weeks s/p COVID vaccine. LKW 2045 on 11/2. 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 Patient transferred to [facility] for mechanical thrombectomy. S/p L ICA terminus occulusion a/p L ICA thrombectomy, no stent, 5 passes TICI 2C  w/ underling L petrous ICA dissection on 11/3 w/ Dr. March. Post op course c/b slight pseudoaneurysm enlargement w/ dissection distal embolisim into the terminal ICA seen on 11/4 CTA and hemorrhagic transformation of L MCA infarct.   Of note, hospital course also significant for fevers, suspect aspiration pneumonitis treated with Rocephin x 1, and dysphagia requiring easy to chew,  with mod thick liquids. EP was consulted with concerns of cardioembolic source for infarct, recommended to f/u outpatient for Zio patch.   Patient had an MBS on 11/12 which showed moderate oropharyngeal dysphagia, remarkable for delayed/reduced laryngeal vestibular closure, pharyngeal trigger delay and reduced hyolaryngeal elevation/excursion.  There was deep laryngeal penetration and possibly aspiration of thin liquid.  Patient was cleared for easy to chew GI diet with mildly thick liquids.    Patient optimized and was evaluated by PM&R and therapy for functional deficits, gait/ADL impairments and acute rehabilitation was recommended. Patient was cleared for discharge to [facility]U on 11/13/24.  (13 Nov 2024 15:44)          Subjective:  Pt examine at bedside after OT this morning. Pt tolerated therapy well. No weekend or overnight events. Pt aphasic but able to give thumbs up and thumbs down to questions. Denies pain. Brito present to leg bag. Discussed with TOV mid week or friday. BM today today. Pt on 2Lnc. + clonus noted to right foot on exam. + Foot drop noted. Prafo ordered to right foot while in bed.       VITALS  Vital Signs Last 24 Hrs  T(C): 36.5 (25 Nov 2024 07:19), Max: 36.5 (25 Nov 2024 07:19)  T(F): 97.7 (25 Nov 2024 07:19), Max: 97.7 (25 Nov 2024 07:19)  HR: 56 (25 Nov 2024 07:19) (56 - 60)  BP: 122/75 (25 Nov 2024 07:19) (109/72 - 122/75)  BP(mean): --  RR: 16 (25 Nov 2024 07:19) (16 - 16)  SpO2: 98% (25 Nov 2024 07:19) (96% - 98%)    Parameters below as of 25 Nov 2024 07:55  Patient On (Oxygen Delivery Method): nasal cannula          REVIEW OF SYMPTOMS  Neurological deficits      PHYSICAL EXAM  Constitutional - NAD, Comfortable   HEENT - NCAT, EOMI   Neck - Supple, No limited ROM  Chest - good chest expansion, good respiratory effort, CTAB    Cardio - warm and well perfused, RRR, no murmur   Abdomen -  Soft, NTND   Extremities - No peripheral edema, No calf tenderness      Neurologic Exam:                       Cognitive -              Orientation: Awake, Alert, Orientation limited due to aphasia            Attention:  limited due to aphasia; can follow simple 1 step commands; 2 step commands with delayed processing            Memory: unable to test due to aphasia     Speech – + Aphasia--Nonfluent, Impaired multiple step command following, but able to follow all simple commands, impaired repetition; + dysarthria,  able to phonate "aah" but did not phonate or verbalize any words.       Cranial Nerves – PERRLA , EOMI, + left facial weakness; facial sensation impaired on right, + dysarthria, + dysphagia, Shoulder shrug impaired        Motor -                        LEFT    UE - ShAB 5/5, EF 5/5, EE 5/5, WE 5/5,  5/5                     RIGHT UE - ShAB 0/5, EF 0/5, EE 0/5, WE 0/5,  0/5                     LEFT    LE - HF 5/5, KE 5/5, DF 5/5, PF 5/5                     RIGHT LE - HF 0/5, KE 0/5, DF 0/5, PF 0/5        Coordination - FTN/HTS intact on the left; unable to assess due to weakness son the right     Sensory – Impaired to LT  on right side;-- withdraws to pain Left LE but no response left UE     Reflexes - DTR  2/ 4 , neg Soria's b/l,  Tone: no increased tone    MSK: right shoulder PROM WNL, +sublux  Psychiatric - Mood stable, Affect WNL       RECENT LABS                                   11.6   6.67  )-----------( 222      ( 25 Nov 2024 06:35 )             34.6     11-25    142  |  106  |  15  ----------------------------<  102[H]  4.0   |  31  |  0.95    Ca    8.9      25 Nov 2024 06:35    TPro  6.0  /  Alb  2.6[L]  /  TBili  0.5  /  DBili  x   /  AST  23  /  ALT  24  /  AlkPhos  56  11-25    LIVER FUNCTIONS - ( 25 Nov 2024 06:35 )  Alb: 2.6 g/dL / Pro: 6.0 g/dL / ALK PHOS: 56 U/L / ALT: 24 U/L / AST: 23 U/L / GGT: x                 Urinalysis Basic - ( 25 Nov 2024 06:35 )    Color: x / Appearance: x / SG: x / pH: x  Gluc: 102 mg/dL / Ketone: x  / Bili: x / Urobili: x   Blood: x / Protein: x / Nitrite: x   Leuk Esterase: x / RBC: x / WBC x   Sq Epi: x / Non Sq Epi: x / Bacteria: x            RADIOLOGY/OTHER RESULTS   EXAM:  US DPLX LWR EXT VEINS COMPL BI   ORDERED BY:  SAAD DILL     PROCEDURE DATE:  11/21/2024          INTERPRETATION:  CLINICAL INFORMATION: monitor R soleal dvt    COMPARISON: 11/14/2024.    TECHNIQUE: Duplex sonography of the BILATERAL LOWER extremity veins with   color and spectral Doppler, with and without compression.    FINDINGS:    RIGHT:  Normal compressibility of the RIGHT common femoral, femoral and popliteal   veins.  Persistent right soleal vein DVT.    LEFT:  Normal compressibility of the LEFT common femoral, femoral and popliteal   veins.  Doppler examination shows normal spontaneous and phasic flow.  No LEFT calf vein thrombosis is detected.    IMPRESSION:  Persistent right soleal vein DVT.    MEDICATIONS  (STANDING):  albuterol    90 MICROgram(s) HFA Inhaler 2 Puff(s) Inhalation every 6 hours  amLODIPine   Tablet 5 milliGRAM(s) Oral daily  aspirin 325 milliGRAM(s) Oral daily  cholecalciferol 1000 Unit(s) Oral daily  clopidogrel Tablet 75 milliGRAM(s) Oral daily  colchicine 0.6 milliGRAM(s) Oral daily  enoxaparin Injectable 40 milliGRAM(s) SubCutaneous every 24 hours  losartan 50 milliGRAM(s) Oral daily  melatonin 9 milliGRAM(s) Oral at bedtime  nitrofurantoin monohydrate/macrocrystals (MACROBID) 100 milliGRAM(s) Oral two times a day  pantoprazole    Tablet 40 milliGRAM(s) Oral before breakfast  PARoxetine 30 milliGRAM(s) Oral daily  polyethylene glycol 3350 17 Gram(s) Oral two times a day  rosuvastatin 5 milliGRAM(s) Oral daily  senna 2 Tablet(s) Oral at bedtime  tamsulosin 0.4 milliGRAM(s) Oral at bedtime    MEDICATIONS  (PRN):  acetaminophen     Tablet .. 650 milliGRAM(s) Oral every 6 hours PRN Mild Pain (1 - 3)  atropine 1% Ophthalmic Solution for SubLingual Use 1 Drop(s) SubLingual every 8 hours PRN cough, and increased upper airway secretions  bisacodyl Suppository 10 milliGRAM(s) Rectal daily PRN Constipation  simethicone 80 milliGRAM(s) Chew four times a day PRN Dyspepsia

## 2024-11-25 NOTE — PROGRESS NOTE ADULT - SUBJECTIVE AND OBJECTIVE BOX
CC: Patient is a 69y old  Male who presents with a chief complaint of L MCA infarct w/ hemorrhagic conversion (24 Nov 2024 12:30)      Interval History:  Patient seen and examined at bedside.  No overnight events  No complaints this morning  Denies CP, SOB, abd pain, N/V    ALLERGIES:  penicillin (Swelling)    MEDICATIONS  (STANDING):  albuterol    90 MICROgram(s) HFA Inhaler 2 Puff(s) Inhalation every 6 hours  aspirin 325 milliGRAM(s) Oral daily  cholecalciferol 1000 Unit(s) Oral daily  clopidogrel Tablet 75 milliGRAM(s) Oral daily  colchicine 0.6 milliGRAM(s) Oral daily  enoxaparin Injectable 40 milliGRAM(s) SubCutaneous every 24 hours  losartan 25 milliGRAM(s) Oral daily  melatonin 9 milliGRAM(s) Oral at bedtime  nitrofurantoin monohydrate/macrocrystals (MACROBID) 100 milliGRAM(s) Oral two times a day  pantoprazole    Tablet 40 milliGRAM(s) Oral before breakfast  PARoxetine 30 milliGRAM(s) Oral daily  polyethylene glycol 3350 17 Gram(s) Oral two times a day  rosuvastatin 5 milliGRAM(s) Oral daily  senna 2 Tablet(s) Oral at bedtime  tamsulosin 0.4 milliGRAM(s) Oral at bedtime    MEDICATIONS  (PRN):  acetaminophen     Tablet .. 650 milliGRAM(s) Oral every 6 hours PRN Mild Pain (1 - 3)  atropine 1% Ophthalmic Solution for SubLingual Use 1 Drop(s) SubLingual every 8 hours PRN cough, and increased upper airway secretions  bisacodyl Suppository 10 milliGRAM(s) Rectal daily PRN Constipation  simethicone 80 milliGRAM(s) Chew four times a day PRN Dyspepsia    Vital Signs Last 24 Hrs  T(F): 97.7 (25 Nov 2024 07:19), Max: 97.7 (25 Nov 2024 07:19)  HR: 56 (25 Nov 2024 07:19) (56 - 60)  BP: 122/75 (25 Nov 2024 07:19) (109/72 - 122/75)  RR: 16 (25 Nov 2024 07:19) (16 - 16)  SpO2: 98% (25 Nov 2024 07:19) (96% - 98%)  I&O's Summary    24 Nov 2024 07:01  -  25 Nov 2024 07:00  --------------------------------------------------------  IN: 0 mL / OUT: 975 mL / NET: -975 mL          PHYSICAL EXAM:    General: NAD, awake, alert   ENT: MMM, no tonsilar exudate  Neck: Supple, No JVD  Lungs: Clear to auscultation bilaterally, no wheezes. Good air entry bilaterally   Cardio: RRR, S1/S2, No murmurs  Abdomen: Soft, Nontender, Nondistended; Bowel sounds present  Extremities: No calf tenderness, No pitting edema    LABS:                        11.6   6.67  )-----------( 222      ( 25 Nov 2024 06:35 )             34.6       11-25    142  |  106  |  15  ----------------------------<  102  4.0   |  31  |  0.95    Ca    8.9      25 Nov 2024 06:35    TPro  6.0  /  Alb  2.6  /  TBili  0.5  /  DBili  x   /  AST  23  /  ALT  24  /  AlkPhos  56  11-25                                  Urinalysis Basic - ( 25 Nov 2024 06:35 )    Color: x / Appearance: x / SG: x / pH: x  Gluc: 102 mg/dL / Ketone: x  / Bili: x / Urobili: x   Blood: x / Protein: x / Nitrite: x   Leuk Esterase: x / RBC: x / WBC x   Sq Epi: x / Non Sq Epi: x / Bacteria: x        Culture - Urine (collected 18 Nov 2024 10:20)  Source: Catheterized Catheterized  Final Report (20 Nov 2024 10:44):    10,000 - 49,000 CFU/mL Enterococcus faecalis  Organism: Enterococcus faecalis (20 Nov 2024 10:44)  Organism: Enterococcus faecalis (20 Nov 2024 10:44)      Method Type: ROB      -  Ampicillin: S <=2 Predicts results to ampicillin/sulbactam, amoxacillin-clavulanate and  piperacillin-tazobactam.      -  Ciprofloxacin: R >2      -  Levofloxacin: R >4      -  Nitrofurantoin: S <=32 Should not be used to treat pyelonephritis.      -  Tetracycline: R >8      -  Vancomycin: S 1      COVID-19 PCR: NotDetenadeem (11-13-24 @ 22:33)      Care Discussed with Consultants/Other Providers: Yes. Rehab provider

## 2024-11-25 NOTE — PROGRESS NOTE ADULT - ASSESSMENT
TARUN CRESPO is a 68 y/o M w/ PMHx HLD, depression, GERD, gout, colonic polyps,  congenital absence of one kidney, recent cholecystitis s/p cholecystectomy (6/24), RHETT on CPAP orginially presented to [facility] on 11/3 with aphasia, R sided weakness and R facial droop and vigorous cough x 2 weeks s/p COVID vaccine. 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  S/p L ICA terminus occulusion a/p L ICA thrombectomy, no stent, 5 passes TICI 2C  w/ underling L petrous ICA dissection on 11/3 w/ Dr. March. Now admitted to [facility] for functional deficits for initiation of a multidisciplinary rehab program     #L MCA stroke w/ hemorrhagic conversion  #L petrous ICA dissection w/ pesudoaneurysm  -CTH 11/3: hyperdense appearance of L basal ganglia and L frontal lobe gyri, mild LCA edema 2/2 recent ischemia  -CTA H/N 11/3: no evidence of significant stenosis affecting the extra cranial carotid or vertebral arteries. No evidence of aneurysm or significant vascular stenosis at [place] of [facility]  -Repeat CTH 11/4: post recent endovascular revascularization of the left internal carotid artery and left middle cerebral artery. Minimally less conspicuous hyperdense appearance of left basal ganglia and left frontal lobe gyri which may represent expected evolution of contrast staining  -MRI brain 11/5: evolving recent infarct in L ICA associated with hemorrhagic transformation of the L basal ganglia. Effacement of cerebral sulci with mass effect causing 0.6 mm rightward shift.   -CTH 11/9: evolving acute to subacute left MCA infarct with evolving left basal ganglia hemorrhagic transformation, new punctuate foci of increased hyperdensity. Stable mass effect and 5mm rightward midline shift  -CTA 11/9: stable left internal carotid artery pseudoaneurysm immediately inferior to the petrous portion of the L ICA at the area of focal dissection.  -S/p L ICA terminus occulusion a/p L ICA thrombectomy, no stent, 5 passes TICI 2C  w/ underling L petrous ICA dissection on 11/3 w/ Dr. March.   -A1c 5.0; LDL 60  -Aspirin 325mg daily  -Plavix 75mg daily   rosuvastatin 5mg QD  -Will be on DAPT for 3 months and follow up with outpt neurology  -F/u outpatient caridology for Zio-patch to r/o cardioembolic source --ILR to be considered as outpatient    #Urinary Retention, repeated > 500cc,   - jerome incerted  - continue flomax  - treat constipation aggressively   - Macrobid since 11/20 for UTI ( E. Faecalis ), afebrile, no leukocytosis on labs, will monitor . Will complete a 7 day course        # Normocytic anemia (stable)   -Monitor H/H (12.2/37.2- on 11/14),  11.6 on Todays's labs  -F/u outpatient colonoscopy for hx of colon polps  -Transfuse <7 PRN    -Iron studies: Transferrin: 11, TIBC: 170, Iron: 18, Ferritin: 414    # Primary HTN   c/w Losartan      #RHETT  -CPAP HS  -Albuterol PRN  -On Flovent at home (not available here)  - supplemental O2 2L,  taper  as tolerated and maintain sat > 90%  - pulmonary consult reviewed  - Incentive spirometry    #Gout  -Colchicine 0.6mg daily  -Uric acid 11/12- 6.8     # R Soleal Vein DVT  - Duplex U/S showed DVT below the knee, R soleal vein thrombosis 11/14  - Aspirin 325 mg daily  - Plavix 75 mg daily  - Lovenox 40 mg SubQ daily 11/15  - repeat doppler 11/21 with persistent DVT, no propagation.   --f/u weekly dopplers    #Depression  -Paxil 30mg daily     # Hypernatremia  - resolved  - continue to monitor     TARUN CRESPO is a 68 y/o M w/ PMHx HLD, depression, GERD, gout, colonic polyps,  congenital absence of one kidney, recent cholecystitis s/p cholecystectomy (6/24), RHETT on CPAP orginially presented to [facility] on 11/3 with aphasia, R sided weakness and R facial droop and vigorous cough x 2 weeks s/p COVID vaccine. 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  S/p L ICA terminus occulusion a/p L ICA thrombectomy, no stent, 5 passes TICI 2C  w/ underling L petrous ICA dissection on 11/3 w/ Dr. March. Now admitted to [facility] for functional deficits for initiation of a multidisciplinary rehab program     #L MCA stroke w/ hemorrhagic conversion  #L petrous ICA dissection w/ pesudoaneurysm  -CTH 11/3: hyperdense appearance of L basal ganglia and L frontal lobe gyri, mild LCA edema 2/2 recent ischemia  -CTA H/N 11/3: no evidence of significant stenosis affecting the extra cranial carotid or vertebral arteries. No evidence of aneurysm or significant vascular stenosis at [place] [facility] [place]  -Repeat CTH 11/4: post recent endovascular revascularization of the left internal carotid artery and left middle cerebral artery. Minimally less conspicuous hyperdense appearance of left basal ganglia and left frontal lobe gyri which may represent expected evolution of contrast staining  -MRI brain 11/5: evolving recent infarct in L ICA associated with hemorrhagic transformation of the L basal ganglia. Effacement of cerebral sulci with mass effect causing 0.6 mm rightward shift.   -CTH 11/9: evolving acute to subacute left MCA infarct with evolving left basal ganglia hemorrhagic transformation, new punctuate foci of increased hyperdensity. Stable mass effect and 5mm rightward midline shift  -CTA 11/9: stable left internal carotid artery pseudoaneurysm immediately inferior to the petrous portion of the L ICA at the area of focal dissection.  -S/p L ICA terminus occulusion a/p L ICA thrombectomy, no stent, 5 passes TICI 2C  w/ underling L petrous ICA dissection on 11/3 w/ Dr. March.   -A1c 5.0; LDL 60  -Aspirin 325mg daily  -Plavix 75mg daily   rosuvastatin 5mg QD  -Will be on DAPT for 3 months and follow up with outpt neurology  -F/u outpatient caridology for Zio-patch to r/o cardioembolic source --ILR to be considered as outpatient    #Urinary Retention, repeated PVR > 500cc,   - jerome inserted  - continue flomax  - treat constipation aggressively   - Macrobid since 11/20 for UTI ( E. Faecalis ), afebrile, no leukocytosis on labs, will monitor . Will complete a 7 day course        # Normocytic anemia (stable)   -Monitor H/H (12.2/37.2- on 11/14),  11.6 on Todays's labs  -F/u outpatient colonoscopy for hx of colon polps  -Transfuse <7 PRN    -Iron studies: Transferrin: 11, TIBC: 170, Iron: 18, Ferritin: 414    # Primary HTN   c/w Losartan      #RHETT  # Hypoxemia  -CPAP HS  -Albuterol PRN  -On Flovent at home (not available here)  - supplemental O2 2L,  taper  as tolerated and maintain sat > 90%  - pulmonary consult reviewed  - Incentive spirometry    #Gout  -Colchicine 0.6mg daily  -Uric acid 11/12- 6.8     # R Soleal Vein DVT  - Duplex U/S showed DVT below the knee, R soleal vein thrombosis 11/14  - Aspirin 325 mg daily  - Plavix 75 mg daily  - Lovenox 40 mg SubQ daily 11/15  - repeat doppler 11/21 with persistent DVT, no propagation.   --f/u weekly dopplers    #Depression  -Paxil 30mg daily     # Hypernatremia  - resolved  - continue to monitor

## 2024-11-26 PROCEDURE — 99233 SBSQ HOSP IP/OBS HIGH 50: CPT | Mod: GC

## 2024-11-26 PROCEDURE — 74230 X-RAY XM SWLNG FUNCJ C+: CPT | Mod: 26

## 2024-11-26 PROCEDURE — 99232 SBSQ HOSP IP/OBS MODERATE 35: CPT

## 2024-11-26 RX ORDER — DONEPEZIL HYDROCHLORIDE 5 MG/1
5 TABLET, FILM COATED ORAL
Refills: 0 | Status: DISCONTINUED | OUTPATIENT
Start: 2024-11-26 | End: 2024-12-05

## 2024-11-26 RX ORDER — DONEPEZIL HYDROCHLORIDE 5 MG/1
5 TABLET, FILM COATED ORAL AT BEDTIME
Refills: 0 | Status: DISCONTINUED | OUTPATIENT
Start: 2024-11-26 | End: 2024-11-26

## 2024-11-26 RX ADMIN — POLYETHYLENE GLYCOL 3350 17 GRAM(S): 17 POWDER, FOR SOLUTION ORAL at 11:10

## 2024-11-26 RX ADMIN — DONEPEZIL HYDROCHLORIDE 5 MILLIGRAM(S): 5 TABLET, FILM COATED ORAL at 11:07

## 2024-11-26 RX ADMIN — Medication 1000 UNIT(S): at 11:07

## 2024-11-26 RX ADMIN — TAMSULOSIN HYDROCHLORIDE 0.4 MILLIGRAM(S): 0.4 CAPSULE ORAL at 21:05

## 2024-11-26 RX ADMIN — Medication 325 MILLIGRAM(S): at 11:07

## 2024-11-26 RX ADMIN — CLOPIDOGREL 75 MILLIGRAM(S): 75 TABLET, FILM COATED ORAL at 11:07

## 2024-11-26 RX ADMIN — ENOXAPARIN SODIUM 40 MILLIGRAM(S): 30 INJECTION SUBCUTANEOUS at 17:27

## 2024-11-26 RX ADMIN — PAROXETINE HYDROCHLORIDE HEMIHYDRATE 30 MILLIGRAM(S): 37.5 TABLET, FILM COATED, EXTENDED RELEASE ORAL at 11:08

## 2024-11-26 RX ADMIN — ROSUVASTATIN CALCIUM 5 MILLIGRAM(S): 5 TABLET, FILM COATED ORAL at 11:10

## 2024-11-26 RX ADMIN — NITROFURANTOIN 100 MILLIGRAM(S): 100 CAPSULE ORAL at 17:29

## 2024-11-26 RX ADMIN — Medication 2 PUFF(S): at 17:37

## 2024-11-26 RX ADMIN — Medication 2 TABLET(S): at 21:05

## 2024-11-26 RX ADMIN — Medication 2 PUFF(S): at 21:36

## 2024-11-26 RX ADMIN — Medication 0.6 MILLIGRAM(S): at 11:07

## 2024-11-26 RX ADMIN — ACETAMINOPHEN, DIPHENHYDRAMINE HCL, PHENYLEPHRINE HCL 9 MILLIGRAM(S): 325; 25; 5 TABLET ORAL at 21:05

## 2024-11-26 RX ADMIN — NITROFURANTOIN 100 MILLIGRAM(S): 100 CAPSULE ORAL at 05:49

## 2024-11-26 RX ADMIN — PANTOPRAZOLE SODIUM 40 MILLIGRAM(S): 40 TABLET, DELAYED RELEASE ORAL at 05:50

## 2024-11-26 NOTE — PROGRESS NOTE ADULT - SUBJECTIVE AND OBJECTIVE BOX
HPI:  70 y/o M w/ PMHx HLD, depression, GERD, gout, colonic polyps,  congenital absence of one kidney, recent cholecystitis s/p cholecystectomy (6/24), RHETT on CPAP orginially presented to [facility] on 11/3 with aphasia, R sided weakness and R facial droop and vigorous cough x 2 weeks s/p COVID vaccine. LKW 2045 on 11/2. 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 Patient transferred to [facility] for mechanical thrombectomy. S/p L ICA terminus occulusion a/p L ICA thrombectomy, no stent, 5 passes TICI 2C  w/ underling L petrous ICA dissection on 11/3 w/ Dr. March. Post op course c/b slight pseudoaneurysm enlargement w/ dissection distal embolisim into the terminal ICA seen on 11/4 CTA and hemorrhagic transformation of L MCA infarct.   Of note, hospital course also significant for fevers, suspect aspiration pneumonitis treated with Rocephin x 1, and dysphagia requiring easy to chew,  with mod thick liquids. EP was consulted with concerns of cardioembolic source for infarct, recommended to f/u outpatient for Zio patch.   Patient had an MBS on 11/12 which showed moderate oropharyngeal dysphagia, remarkable for delayed/reduced laryngeal vestibular closure, pharyngeal trigger delay and reduced hyolaryngeal elevation/excursion.  There was deep laryngeal penetration and possibly aspiration of thin liquid.  Patient was cleared for easy to chew GI diet with mildly thick liquids.    Patient optimized and was evaluated by PM&R and therapy for functional deficits, gait/ADL impairments and acute rehabilitation was recommended. Patient was cleared for discharge to [facility] IRU on 11/13/24.  (13 Nov 2024 15:44)          Subjective:  Pt examine at bedside. NAD and no events overnight. on 2 L NC in good spirits. Pt aphasic nods yes and no to questions along with gesturing. Pt educated on using his own CPAP use at night d/t hx of RHETT. 3 soft BM yesterday with abdominal pain. Miralax modified to daily. No BM today thus far. Right foot drop prafo boot in place. Brito in place with continued tea color urine. Increased notiWife updated at bedside yesterday.   Pt denies headache Chest pain, SOB, abdomin pain NVD, dysuria       VITALS  Vital Signs Last 24 Hrs  T(C): 36.8 (26 Nov 2024 07:19), Max: 36.9 (25 Nov 2024 21:12)  T(F): 98.2 (26 Nov 2024 07:19), Max: 98.4 (25 Nov 2024 21:12)  HR: 67 (26 Nov 2024 07:19) (56 - 67)  BP: 136/80 (26 Nov 2024 07:19) (107/70 - 136/80)  BP(mean): --  RR: 16 (26 Nov 2024 07:19) (16 - 16)  SpO2: 97% (26 Nov 2024 07:19) (97% - 98%)    Parameters below as of 26 Nov 2024 08:07  Patient On (Oxygen Delivery Method): nasal cannula            REVIEW OF SYMPTOMS  Neurological deficits      PHYSICAL EXAM  Constitutional - NAD, Comfortable   HEENT - NCAT, EOMI   Neck - Supple, No limited ROM  Chest - good chest expansion, good respiratory effort, CTAB    Cardio - warm and well perfused, RRR, no murmur   Abdomen -  Soft, NTND   Extremities - No peripheral edema, No calf tenderness      Neurologic Exam:                       Cognitive -              Orientation: Awake, Alert, Orientation limited due to aphasia            Attention:  limited due to aphasia; can follow simple 1 step commands; 2 step commands with delayed processing            Memory: unable to test due to aphasia     Speech – + Aphasia--Nonfluent, Impaired multiple step command following, but able to follow all simple commands, impaired repetition; + dysarthria,  able to phonate "aah" but did not phonate or verbalize any words.       Cranial Nerves – PERRLA , EOMI, + left facial weakness; facial sensation impaired on right, + dysarthria, + dysphagia, Shoulder shrug impaired        Motor -                        LEFT    UE - ShAB 5/5, EF 5/5, EE 5/5, WE 5/5,  5/5                     RIGHT UE - ShAB 0/5, EF 0/5, EE 0/5, WE 0/5,  0/5                     LEFT    LE - HF 5/5, KE 5/5, DF 5/5, PF 5/5                     RIGHT LE - HF 0/5, KE 0/5, DF 0/5, PF 0/5        Coordination - FTN/HTS intact on the left; unable to assess due to weakness son the right     Sensory – Impaired to LT  on right side;-- withdraws to pain Left LE but no response left UE     Reflexes - DTR  2/ 4 , neg Soria's b/l,  Tone: no increased tone    MSK: right shoulder PROM WNL, +sublux  Psychiatric - Mood stable, Affect WNL       RECENT LABS                                   11.6   6.67  )-----------( 222      ( 25 Nov 2024 06:35 )             34.6     11-25    142  |  106  |  15  ----------------------------<  102[H]  4.0   |  31  |  0.95    Ca    8.9      25 Nov 2024 06:35    TPro  6.0  /  Alb  2.6[L]  /  TBili  0.5  /  DBili  x   /  AST  23  /  ALT  24  /  AlkPhos  56  11-25    LIVER FUNCTIONS - ( 25 Nov 2024 06:35 )  Alb: 2.6 g/dL / Pro: 6.0 g/dL / ALK PHOS: 56 U/L / ALT: 24 U/L / AST: 23 U/L / GGT: x                 Urinalysis Basic - ( 25 Nov 2024 06:35 )    Color: x / Appearance: x / SG: x / pH: x  Gluc: 102 mg/dL / Ketone: x  / Bili: x / Urobili: x   Blood: x / Protein: x / Nitrite: x   Leuk Esterase: x / RBC: x / WBC x   Sq Epi: x / Non Sq Epi: x / Bacteria: x            RADIOLOGY/OTHER RESULTS   EXAM:  US DPLX LWR EXT VEINS COMPL BI   ORDERED BY:  SAAD DILL     PROCEDURE DATE:  11/21/2024          INTERPRETATION:  CLINICAL INFORMATION: monitor R soleal dvt    COMPARISON: 11/14/2024.    TECHNIQUE: Duplex sonography of the BILATERAL LOWER extremity veins with   color and spectral Doppler, with and without compression.    FINDINGS:    RIGHT:  Normal compressibility of the RIGHT common femoral, femoral and popliteal   veins.  Persistent right soleal vein DVT.    LEFT:  Normal compressibility of the LEFT common femoral, femoral and popliteal   veins.  Doppler examination shows normal spontaneous and phasic flow.  No LEFT calf vein thrombosis is detected.    IMPRESSION:  Persistent right soleal vein DVT.    MEDICATIONS  (STANDING):  albuterol    90 MICROgram(s) HFA Inhaler 2 Puff(s) Inhalation every 6 hours  amLODIPine   Tablet 5 milliGRAM(s) Oral daily  aspirin 325 milliGRAM(s) Oral daily  cholecalciferol 1000 Unit(s) Oral daily  clopidogrel Tablet 75 milliGRAM(s) Oral daily  colchicine 0.6 milliGRAM(s) Oral daily  enoxaparin Injectable 40 milliGRAM(s) SubCutaneous every 24 hours  losartan 50 milliGRAM(s) Oral daily  melatonin 9 milliGRAM(s) Oral at bedtime  nitrofurantoin monohydrate/macrocrystals (MACROBID) 100 milliGRAM(s) Oral two times a day  pantoprazole    Tablet 40 milliGRAM(s) Oral before breakfast  PARoxetine 30 milliGRAM(s) Oral daily  polyethylene glycol 3350 17 Gram(s) Oral two times a day  rosuvastatin 5 milliGRAM(s) Oral daily  senna 2 Tablet(s) Oral at bedtime  tamsulosin 0.4 milliGRAM(s) Oral at bedtime    MEDICATIONS  (PRN):  acetaminophen     Tablet .. 650 milliGRAM(s) Oral every 6 hours PRN Mild Pain (1 - 3)  atropine 1% Ophthalmic Solution for SubLingual Use 1 Drop(s) SubLingual every 8 hours PRN cough, and increased upper airway secretions  bisacodyl Suppository 10 milliGRAM(s) Rectal daily PRN Constipation  simethicone 80 milliGRAM(s) Chew four times a day PRN Dyspepsia         HPI:  70 y/o M w/ PMHx HLD, depression, GERD, gout, colonic polyps,  congenital absence of one kidney, recent cholecystitis s/p cholecystectomy (6/24), RHETT on CPAP orginially presented to [facility] on 11/3 with aphasia, R sided weakness and R facial droop and vigorous cough x 2 weeks s/p COVID vaccine. LKW 2045 on 11/2. 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 Patient transferred to [facility] for mechanical thrombectomy. S/p L ICA terminus occulusion a/p L ICA thrombectomy, no stent, 5 passes TICI 2C  w/ underling L petrous ICA dissection on 11/3 w/ Dr. Mrach. Post op course c/b slight pseudoaneurysm enlargement w/ dissection distal embolisim into the terminal ICA seen on 11/4 CTA and hemorrhagic transformation of L MCA infarct.   Of note, hospital course also significant for fevers, suspect aspiration pneumonitis treated with Rocephin x 1, and dysphagia requiring easy to chew,  with mod thick liquids. EP was consulted with concerns of cardioembolic source for infarct, recommended to f/u outpatient for Zio patch.   Patient had an MBS on 11/12 which showed moderate oropharyngeal dysphagia, remarkable for delayed/reduced laryngeal vestibular closure, pharyngeal trigger delay and reduced hyolaryngeal elevation/excursion.  There was deep laryngeal penetration and possibly aspiration of thin liquid.  Patient was cleared for easy to chew GI diet with mildly thick liquids.    Patient optimized and was evaluated by PM&R and therapy for functional deficits, gait/ADL impairments and acute rehabilitation was recommended. Patient was cleared for discharge to [facility] IRU on 11/13/24.  (13 Nov 2024 15:44)          Subjective:  Pt examine at bedside. NAD and no events overnight. on 2 L NC in good spirits. Pt aphasic nods yes and no to questions along with gesturing. Pt educated on using his own CPAP use at night d/t hx of RHETT. 3 soft BM yesterday with abdominal pain. Miralax modified to daily. No BM today thus far. Right foot drop prafo boot in place. Brito in place with continued tea color urine. Wife updated at bedside yesterday.   Pt denies headache Chest pain, SOB, abdomin pain NVD, dysuria       VITALS  Vital Signs Last 24 Hrs  T(C): 36.8 (26 Nov 2024 07:19), Max: 36.9 (25 Nov 2024 21:12)  T(F): 98.2 (26 Nov 2024 07:19), Max: 98.4 (25 Nov 2024 21:12)  HR: 67 (26 Nov 2024 07:19) (56 - 67)  BP: 136/80 (26 Nov 2024 07:19) (107/70 - 136/80)  BP(mean): --  RR: 16 (26 Nov 2024 07:19) (16 - 16)  SpO2: 97% (26 Nov 2024 07:19) (97% - 98%)    Parameters below as of 26 Nov 2024 08:07  Patient On (Oxygen Delivery Method): nasal cannula            REVIEW OF SYMPTOMS  Neurological deficits      PHYSICAL EXAM  Constitutional - NAD, Comfortable   HEENT - NCAT, EOMI   Neck - Supple, No limited ROM  Chest - good chest expansion, good respiratory effort, CTAB    Cardio - warm and well perfused, RRR, no murmur   Abdomen -  Soft, NTND   Extremities - No peripheral edema, No calf tenderness      Neurologic Exam:                       Cognitive -              Orientation: Awake, Alert, Orientation limited due to aphasia            Attention:  limited due to aphasia; can follow simple 1 step commands; 2 step commands with delayed processing            Memory: unable to test due to aphasia     Speech – + Aphasia--Nonfluent, Impaired multiple step command following, but able to follow all simple commands, impaired repetition; + dysarthria,  able to phonate "aah" but did not phonate or verbalize any words.       Cranial Nerves – PERRLA , EOMI, + left facial weakness; facial sensation impaired on right, + dysarthria, + dysphagia, Shoulder shrug impaired        Motor -                        LEFT    UE - ShAB 5/5, EF 5/5, EE 5/5, WE 5/5,  5/5                     RIGHT UE - ShAB 0/5, EF 0/5, EE 0/5, WE 0/5,  0/5                     LEFT    LE - HF 5/5, KE 5/5, DF 5/5, PF 5/5                     RIGHT LE - HF 0/5, KE 0/5, DF 0/5, PF 0/5        Coordination - FTN/HTS intact on the left; unable to assess due to weakness son the right     Sensory – Impaired to LT  on right side;-- withdraws to pain Left LE but no response left UE     Reflexes - DTR  2/ 4 , neg Soria's b/l,  Tone: no increased tone    MSK: right shoulder PROM WNL, +sublux  Psychiatric - Mood stable, Affect WNL       RECENT LABS                                   11.6   6.67  )-----------( 222      ( 25 Nov 2024 06:35 )             34.6     11-25    142  |  106  |  15  ----------------------------<  102[H]  4.0   |  31  |  0.95    Ca    8.9      25 Nov 2024 06:35    TPro  6.0  /  Alb  2.6[L]  /  TBili  0.5  /  DBili  x   /  AST  23  /  ALT  24  /  AlkPhos  56  11-25    LIVER FUNCTIONS - ( 25 Nov 2024 06:35 )  Alb: 2.6 g/dL / Pro: 6.0 g/dL / ALK PHOS: 56 U/L / ALT: 24 U/L / AST: 23 U/L / GGT: x                 Urinalysis Basic - ( 25 Nov 2024 06:35 )    Color: x / Appearance: x / SG: x / pH: x  Gluc: 102 mg/dL / Ketone: x  / Bili: x / Urobili: x   Blood: x / Protein: x / Nitrite: x   Leuk Esterase: x / RBC: x / WBC x   Sq Epi: x / Non Sq Epi: x / Bacteria: x            RADIOLOGY/OTHER RESULTS   EXAM:  US DPLX LWR EXT VEINS COMPL BI   ORDERED BY:  SAAD DILL     PROCEDURE DATE:  11/21/2024          INTERPRETATION:  CLINICAL INFORMATION: monitor R soleal dvt    COMPARISON: 11/14/2024.    TECHNIQUE: Duplex sonography of the BILATERAL LOWER extremity veins with   color and spectral Doppler, with and without compression.    FINDINGS:    RIGHT:  Normal compressibility of the RIGHT common femoral, femoral and popliteal   veins.  Persistent right soleal vein DVT.    LEFT:  Normal compressibility of the LEFT common femoral, femoral and popliteal   veins.  Doppler examination shows normal spontaneous and phasic flow.  No LEFT calf vein thrombosis is detected.    IMPRESSION:  Persistent right soleal vein DVT.    MEDICATIONS  (STANDING):  albuterol    90 MICROgram(s) HFA Inhaler 2 Puff(s) Inhalation every 6 hours  amLODIPine   Tablet 5 milliGRAM(s) Oral daily  aspirin 325 milliGRAM(s) Oral daily  cholecalciferol 1000 Unit(s) Oral daily  clopidogrel Tablet 75 milliGRAM(s) Oral daily  colchicine 0.6 milliGRAM(s) Oral daily  enoxaparin Injectable 40 milliGRAM(s) SubCutaneous every 24 hours  losartan 50 milliGRAM(s) Oral daily  melatonin 9 milliGRAM(s) Oral at bedtime  nitrofurantoin monohydrate/macrocrystals (MACROBID) 100 milliGRAM(s) Oral two times a day  pantoprazole    Tablet 40 milliGRAM(s) Oral before breakfast  PARoxetine 30 milliGRAM(s) Oral daily  polyethylene glycol 3350 17 Gram(s) Oral two times a day  rosuvastatin 5 milliGRAM(s) Oral daily  senna 2 Tablet(s) Oral at bedtime  tamsulosin 0.4 milliGRAM(s) Oral at bedtime    MEDICATIONS  (PRN):  acetaminophen     Tablet .. 650 milliGRAM(s) Oral every 6 hours PRN Mild Pain (1 - 3)  atropine 1% Ophthalmic Solution for SubLingual Use 1 Drop(s) SubLingual every 8 hours PRN cough, and increased upper airway secretions  bisacodyl Suppository 10 milliGRAM(s) Rectal daily PRN Constipation  simethicone 80 milliGRAM(s) Chew four times a day PRN Dyspepsia

## 2024-11-26 NOTE — PROGRESS NOTE ADULT - ASSESSMENT
68 y/o M w/ PMHx HLD, depression, GERD, gout, colonic polyps,  congenital absence of one kidney, recent cholecystitis s/p cholecystectomy (6/24), RHETT on CPAP orginially presented to [facility] on 11/3/24 with 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  S/p L ICA terminus occulusion a/p L ICA thrombectomy,  w/ underling L petrous ICA dissection     # L MCA stroke w/ hemorrhagic conversion  # L petrous ICA dissection w/ pesudoaneurysm  - CTH 11/9: evolving acute to subacute left MCA infarct with evolving left basal ganglia hemorrhagic transformation, new punctuate foci of increased hyperdensity. Stable mass effect and 5mm rightward midline shift  - Aspirin 325mg daily  - Plavix 75mg daily   - Rosuvastatin 5mg QD  - outpatient cardiology f/u for w/u r/o cardioembolic source  - continue comprehensive rehab program, 3 hours a day, 5 days a week.    HTN---Orthostatic Hypotension  -- Stopped Amlodipine  -- decreased losartan to 25mg daily  -- Monitor BP  -- check orthostatics.     # R Soleal Vein DVT  - Duplex U/S showed DVT below the knee, R soleal vein thrombosis 11/14  - Lovenox 40 mg SubQ daily 11/15  - f/u weekly dopplers, last doppler 11/21 shows Persistent right soleal vein DVT.    #Urinary Retention, unimproved  -continue bladder scans  -UA without gross contamination, but per hospitalist will start cipro 11/18 empirically and send urine culture.  UCx resulted with cipro resistance, so macrobid started. Last day 11/27.   -bowels reg.   - flomax 0.4 initiated daily 11/18  - no void throughout this week-- has been requiring SC   - jerome Catheter ordered  - d/w hospitalist  - Hospitalist spoke with pt's wife regarding need for jerome  - Consider TOV next week.     # UTI  -- u/a +  -- on cipro, discontinued 11/20  - 11/20 started macrobid due to sensitivities. Last day 11/27  -- urine Cx +Enteroccocus  --may likely be contributing to  retention      # Normocytic anemia (stable) vs Iron deficiency anemia   - F/u outpatient colonoscopy for hx of colon polps  - Transfuse <7 PRN    - Iron studies: Transferrin: 11, TIBC: 170, Iron: 18, Ferritin: 414  - monitor H/H. Hgb 12.0 11/18 stable  - CBC 11/21--Hb stable 11.5    # RHETT  # Activity-Induced Hypoxia  - CXR- negative   - CPAP HS 5 50% O2. Poor compliance  - O2 via NC PRN  - Albuterol PRN    # Gout  - Colchicine 0.6mg daily  - Uric acid 11/12- 6.8     # Mood/Cognition  # Depression  - Paxil 30mg daily     # Sleep:   -cont. melatonin 9 qhs     # Pain Management:  - Tylenol PRN    # GI/Bowel:  - Senna QHS  - Miralax daily   - Bisacodyl suppository QD PRN  - simethicone for gas pain  - GI ppx: Pantoprazole 40mg daily   - constipation with abdominal discomfort: will give dulcolax suppository. If persists, AXR  - per SLP, will initiate free water protocol   - continue with aggressive bowel regimen to assist with urinary retention    # Dysphagia:  - S/p MBS on 11/12--> diet: Easy to chew; mild thick liquids- DASH  - 1:1 feeds      Letter for cruise ship cancellation due to patient's injury.     IDT 11/19  [place]  RN - incontinent of bowel, requiring SC  SW - condo with wife, 3-4 arely. elevator to 7th floor.  Independent prior, driving and working.  Wife is retired, main support and she drives.  SLP - easy to chew with mildly thick liquids.  Severe apraxia, aphasia, dysarthria. He can approximate single words with multiple cues.  He has picture board for AAC.  On free water protocol.  Repeat MBS end of this week or beginning of next week.  Suspect cog deficits but difficult to determine due to apraxia and dysarthria, and aphasia.  OT - supervision with eating, jose l for oral hygiene, maxA for bathing, UBD, and totA for toileting, LBD, commode transfer.  No shower transfer yet.  Today, mod2 for squat pivot, no active movement UE.  Therapy requesting max time.  Goals are jose l at WC level.  PT - bed mob maxA; transfer totA; non ambulatory.  Goal is Jose L  Goal 1 - use multimodalities to communicate basic wants and needs with staff max cues.     Goal 2 - transfer OOB with minimal assist  barriers - communication, hemiplegia, sensory impairment, aphasia, poor endurance.    admit GC 11/13.    CMG is 12/5.  DC 12/5 to [facility]            ---------------  Code Status: FULL  Emergency Contact:    Outpatient Follow-up (Specialty/Name of physician):  Dr. Cheng March  Neurology  12/24/24 appt     Cardiology    ENT  [phone]         70 y/o M w/ PMHx HLD, depression, GERD, gout, colonic polyps,  congenital absence of one kidney, recent cholecystitis s/p cholecystectomy (6/24), RHETT on CPAP orginially presented to [facility] on 11/3/24 with 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  S/p L ICA terminus occulusion a/p L ICA thrombectomy,  w/ underling L petrous ICA dissection     # L MCA stroke w/ hemorrhagic conversion  # L petrous ICA dissection w/ pesudoaneurysm  - CTH 11/9: evolving acute to subacute left MCA infarct with evolving left basal ganglia hemorrhagic transformation, new punctuate foci of increased hyperdensity. Stable mass effect and 5mm rightward midline shift  - Aspirin 325mg daily  - Plavix 75mg daily   - Rosuvastatin 5mg QD  - outpatient cardiology f/u for w/u r/o cardioembolic source  - continue comprehensive rehab program, 3 hours a day, 5 days a week.    Aphasia  - start Aricept 5 mg 11/26-- monitor for SE- abdominal pain, n/v    HTN---Orthostatic Hypotension  -- Stopped Amlodipine  -- decreased losartan to 25mg daily  -- Monitor BP--107/70 this AM  -- check orthostatics.     # R Soleal Vein DVT  - Duplex U/S showed DVT below the knee, R soleal vein thrombosis 11/14  - Lovenox 40 mg SubQ daily 11/15  - f/u weekly dopplers, last doppler 11/21 shows Persistent right soleal vein DVT. REPEAT 11/29     #Urinary Retention, unimproved  -continue bladder scans  -UA without gross contamination, but per hospitalist will start cipro 11/18 empirically and send urine culture.  UCx resulted with cipro resistance, so macrobid started. Last day 11/27.   -bowels reg.   - flomax 0.4 initiated daily 11/18  - no void throughout this week-- has been requiring SC   - jerome Catheter ordered  - d/w hospitalist  - Hospitalist spoke with pt's wife regarding need for jerome  - Consider TOV next week.     # UTI  -- u/a +  -- on cipro, discontinued 11/20  - 11/20 started macrobid due to sensitivities. Last day 11/27  -- urine Cx +Enteroccocus  --may likely be contributing to  retention      # Normocytic anemia (stable) vs Iron deficiency anemia   - F/u outpatient colonoscopy for hx of colon polps  - Transfuse <7 PRN    - Iron studies: Transferrin: 11, TIBC: 170, Iron: 18, Ferritin: 414  - monitor H/H. Hgb 12.0 11/18 stable  - CBC 11/21--Hb stable 11.5    # RHETT  # Activity-Induced Hypoxia  - CXR- negative   - CPAP HS 5 50% O2. Poor compliance  - O2 via NC PRN  - Albuterol PRN    # Gout  - Colchicine 0.6mg daily  - Uric acid 11/12- 6.8     # Mood/Cognition  # Depression  - Paxil 30mg daily     # Sleep:   -cont. melatonin 9 qhs     # Pain Management:  - Tylenol PRN    # GI/Bowel:  - Senna QHS  - Miralax daily   - Bisacodyl suppository QD PRN  - simethicone for gas pain  - GI ppx: Pantoprazole 40mg daily   - constipation with abdominal discomfort: will give dulcolax suppository. If persists, AXR  - per SLP, will initiate free water protocol   - continue with aggressive bowel regimen to assist with urinary retention    # Dysphagia:  - S/p MBS on 11/12--> diet: Easy to chew; mild thick liquids- DASH  - 1:1 feeds

## 2024-11-26 NOTE — PROGRESS NOTE ADULT - SUBJECTIVE AND OBJECTIVE BOX
CC: Patient is a 69y old  Male who presents with a chief complaint of L MCA infarct w/ hemorrhagic conversion (26 Nov 2024 09:14)      Interval History:  Patient seen and examined at bedside.  No overnight events  No complaints this morning  Denies CP, SOB, abd pain, N/V/D    ALLERGIES:  penicillin (Swelling)    MEDICATIONS  (STANDING):  albuterol    90 MICROgram(s) HFA Inhaler 2 Puff(s) Inhalation every 6 hours  aspirin 325 milliGRAM(s) Oral daily  cholecalciferol 1000 Unit(s) Oral daily  clopidogrel Tablet 75 milliGRAM(s) Oral daily  colchicine 0.6 milliGRAM(s) Oral daily  enoxaparin Injectable 40 milliGRAM(s) SubCutaneous every 24 hours  losartan 25 milliGRAM(s) Oral daily  melatonin 9 milliGRAM(s) Oral at bedtime  nitrofurantoin monohydrate/macrocrystals (MACROBID) 100 milliGRAM(s) Oral two times a day  pantoprazole    Tablet 40 milliGRAM(s) Oral before breakfast  PARoxetine 30 milliGRAM(s) Oral daily  polyethylene glycol 3350 17 Gram(s) Oral daily  rosuvastatin 5 milliGRAM(s) Oral daily  senna 2 Tablet(s) Oral at bedtime  tamsulosin 0.4 milliGRAM(s) Oral at bedtime    MEDICATIONS  (PRN):  acetaminophen     Tablet .. 650 milliGRAM(s) Oral every 6 hours PRN Mild Pain (1 - 3)  atropine 1% Ophthalmic Solution for SubLingual Use 1 Drop(s) SubLingual every 8 hours PRN cough, and increased upper airway secretions  bisacodyl Suppository 10 milliGRAM(s) Rectal daily PRN Constipation  simethicone 80 milliGRAM(s) Chew four times a day PRN Dyspepsia    Vital Signs Last 24 Hrs  T(F): 98.2 (26 Nov 2024 07:19), Max: 98.4 (25 Nov 2024 21:12)  HR: 67 (26 Nov 2024 07:19) (56 - 67)  BP: 136/80 (26 Nov 2024 07:19) (107/70 - 136/80)  RR: 16 (26 Nov 2024 07:19) (16 - 16)  SpO2: 97% (26 Nov 2024 07:19) (97% - 98%)  I&O's Summary        PHYSICAL EXAM:  General: NAD, awake, alert   ENT: MMM, no tonsilar exudate  Neck: Supple, No JVD  Lungs: Clear to auscultation bilaterally, no wheezes. Good air entry bilaterally   Cardio: RRR, S1/S2, No murmurs  Abdomen: Soft, Nontender, Nondistended; Bowel sounds present  Extremities: No calf tenderness, No pitting edema    LABS:                        11.6   6.67  )-----------( 222      ( 25 Nov 2024 06:35 )             34.6       11-25    142  |  106  |  15  ----------------------------<  102  4.0   |  31  |  0.95    Ca    8.9      25 Nov 2024 06:35    TPro  6.0  /  Alb  2.6  /  TBili  0.5  /  DBili  x   /  AST  23  /  ALT  24  /  AlkPhos  56  11-25                                  Urinalysis Basic - ( 25 Nov 2024 06:35 )    Color: x / Appearance: x / SG: x / pH: x  Gluc: 102 mg/dL / Ketone: x  / Bili: x / Urobili: x   Blood: x / Protein: x / Nitrite: x   Leuk Esterase: x / RBC: x / WBC x   Sq Epi: x / Non Sq Epi: x / Bacteria: x        COVID-19 PCR: NotDetec (11-13-24 @ 22:33)      Care Discussed with Consultants/Other Providers: Yes. Rehab provider

## 2024-11-26 NOTE — PROGRESS NOTE ADULT - NS ATTEND AMEND GEN_ALL_CORE FT
I have personally seen and examined the patient.  I fully participated in the care of this patient.  I have made amendments to the documentation where necessary, and agree with the history, physical exam, and plan as documented above  Patient seen at bedside while in the , on 2 L 02 via NC. Expressive >receptive deficits, able to make sounds.  Shakes head no when asked if he has pain.  right hemiplegia  on macrobid for UTI  starting aricept for aphasia

## 2024-11-26 NOTE — PROGRESS NOTE ADULT - ASSESSMENT
TARUN CRESPO is a 70 y/o M w/ PMHx HLD, depression, GERD, gout, colonic polyps,  congenital absence of one kidney, recent cholecystitis s/p cholecystectomy (6/24), RHETT on CPAP orginially presented to [facility] on 11/3 with aphasia, R sided weakness and R facial droop and vigorous cough x 2 weeks s/p COVID vaccine. 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  S/p L ICA terminus occulusion a/p L ICA thrombectomy, no stent, 5 passes TICI 2C  w/ underling L petrous ICA dissection on 11/3 w/ Dr. March. Now admitted to [facility] for functional deficits for initiation of a multidisciplinary rehab program     #L MCA stroke w/ hemorrhagic conversion  #L petrous ICA dissection w/ pesudoaneurysm  -CTH 11/3: hyperdense appearance of L basal ganglia and L frontal lobe gyri, mild LCA edema 2/2 recent ischemia  -CTA H/N 11/3: no evidence of significant stenosis affecting the extra cranial carotid or vertebral arteries. No evidence of aneurysm or significant vascular stenosis at [place] of [place]  -Repeat CTH 11/4: post recent endovascular revascularization of the left internal carotid artery and left middle cerebral artery. Minimally less conspicuous hyperdense appearance of left basal ganglia and left frontal lobe gyri which may represent expected evolution of contrast staining  -MRI brain 11/5: evolving recent infarct in L ICA associated with hemorrhagic transformation of the L basal ganglia. Effacement of cerebral sulci with mass effect causing 0.6 mm rightward shift.   -CTH 11/9: evolving acute to subacute left MCA infarct with evolving left basal ganglia hemorrhagic transformation, new punctuate foci of increased hyperdensity. Stable mass effect and 5mm rightward midline shift  -CTA 11/9: stable left internal carotid artery pseudoaneurysm immediately inferior to the petrous portion of the L ICA at the area of focal dissection.  -S/p L ICA terminus occulusion a/p L ICA thrombectomy, no stent, 5 passes TICI 2C  w/ underling L petrous ICA dissection on 11/3 w/ Dr. March.   -A1c 5.0; LDL 60  -Aspirin 325mg daily  -Plavix 75mg daily   rosuvastatin 5mg QD  -Will be on DAPT for 3 months and follow up with outpt neurology  -F/u outpatient cardiology for Zio-patch to r/o cardioembolic source --ILR to be considered as outpatient    #Urinary Retention, repeated PVR > 500cc,   - jerome inserted  - continue flomax  - treat constipation aggressively   - Macrobid since 11/20 for UTI ( E. Faecalis ), afebrile, no leukocytosis on labs, will monitor . Will complete a 7 day course        # Normocytic anemia (stable)   -Monitor H/H (12.2/37.2- on 11/14), most recent Hb 11.6  -F/u outpatient colonoscopy for hx of colon polps  -Transfuse <7 PRN    -Iron studies: Transferrin: 11, TIBC: 170, Iron: 18, Ferritin: 414    # Primary HTN   - BP has been stable without meds  - Losartan has been on hold based on parameters  - continue to monitor and adjust meds as needed      #RHETT  # Hypoxemia  -CPAP HS  -Albuterol PRN  -On Flovent at home (not available here)  - supplemental O2 2L,  taper  as tolerated and maintain sat > 90%  - pulmonary consult reviewed  - Incentive spirometry    #Gout  -Colchicine 0.6mg daily  -Uric acid 11/12- 6.8     # R Soleal Vein DVT  - Duplex U/S showed DVT below the knee, R soleal vein thrombosis 11/14  - Aspirin 325 mg daily  - Plavix 75 mg daily  - Lovenox 40 mg SubQ daily 11/15  - repeat doppler 11/21 with persistent DVT, no propagation.   --f/u weekly dopplers    #Depression  -Paxil 30mg daily     # Hypernatremia  - resolved  - continue to monitor

## 2024-11-27 PROCEDURE — 99232 SBSQ HOSP IP/OBS MODERATE 35: CPT

## 2024-11-27 PROCEDURE — 99232 SBSQ HOSP IP/OBS MODERATE 35: CPT | Mod: GC

## 2024-11-27 RX ADMIN — CLOPIDOGREL 75 MILLIGRAM(S): 75 TABLET, FILM COATED ORAL at 12:12

## 2024-11-27 RX ADMIN — PANTOPRAZOLE SODIUM 40 MILLIGRAM(S): 40 TABLET, DELAYED RELEASE ORAL at 05:37

## 2024-11-27 RX ADMIN — ENOXAPARIN SODIUM 40 MILLIGRAM(S): 30 INJECTION SUBCUTANEOUS at 17:39

## 2024-11-27 RX ADMIN — Medication 1000 UNIT(S): at 12:13

## 2024-11-27 RX ADMIN — Medication 0.6 MILLIGRAM(S): at 12:13

## 2024-11-27 RX ADMIN — ROSUVASTATIN CALCIUM 5 MILLIGRAM(S): 5 TABLET, FILM COATED ORAL at 12:12

## 2024-11-27 RX ADMIN — Medication 2 PUFF(S): at 08:15

## 2024-11-27 RX ADMIN — ACETAMINOPHEN, DIPHENHYDRAMINE HCL, PHENYLEPHRINE HCL 9 MILLIGRAM(S): 325; 25; 5 TABLET ORAL at 21:52

## 2024-11-27 RX ADMIN — TAMSULOSIN HYDROCHLORIDE 0.4 MILLIGRAM(S): 0.4 CAPSULE ORAL at 21:52

## 2024-11-27 RX ADMIN — LOSARTAN POTASSIUM 25 MILLIGRAM(S): 100 TABLET, FILM COATED ORAL at 05:34

## 2024-11-27 RX ADMIN — Medication 325 MILLIGRAM(S): at 12:12

## 2024-11-27 RX ADMIN — NITROFURANTOIN 100 MILLIGRAM(S): 100 CAPSULE ORAL at 05:34

## 2024-11-27 RX ADMIN — PAROXETINE HYDROCHLORIDE HEMIHYDRATE 30 MILLIGRAM(S): 37.5 TABLET, FILM COATED, EXTENDED RELEASE ORAL at 12:13

## 2024-11-27 RX ADMIN — DONEPEZIL HYDROCHLORIDE 5 MILLIGRAM(S): 5 TABLET, FILM COATED ORAL at 07:41

## 2024-11-27 NOTE — PROGRESS NOTE ADULT - ASSESSMENT
70 y/o M w/ PMHx HLD, depression, GERD, gout, colonic polyps,  congenital absence of one kidney, recent cholecystitis s/p cholecystectomy (6/24), RHETT on CPAP orginially presented to [facility] on 11/3/24 with 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  S/p L ICA terminus occulusion a/p L ICA thrombectomy,  w/ underling L petrous ICA dissection     # L MCA stroke w/ hemorrhagic conversion  # L petrous ICA dissection w/ pesudoaneurysm  - CTH 11/9: evolving acute to subacute left MCA infarct with evolving left basal ganglia hemorrhagic transformation, new punctuate foci of increased hyperdensity. Stable mass effect and 5mm rightward midline shift  - Aspirin 325mg daily  - Plavix 75mg daily   - Rosuvastatin 5mg QD  - outpatient cardiology f/u for w/u r/o cardioembolic source  - continue comprehensive rehab program, 3 hours a day, 5 days a week.    Aphasia  - C/w Aricept 5 mg 11/26-- monitor for SE- abdominal pain, n/v-- no reported symptoms     HTN---Orthostatic Hypotension  -- Amlodipine dc'd  --  losartan to 25mg daily--discontined  -- Monitor BP--111/65 this AM    # R Soleal Vein DVT  - Duplex U/S showed DVT below the knee, R soleal vein thrombosis 11/14  - Lovenox 40 mg SubQ daily 11/15  - f/u weekly dopplers, last doppler 11/21 shows Persistent right soleal vein DVT. REPEAT 11/29     #Urinary Retention, unimproved  -continue bladder scans  -UA without gross contamination, but per hospitalist will start cipro 11/18 empirically and send urine culture.  UCx resulted with cipro resistance--macrobid until 11/27  -bowels reg.   - flomax 0.4 (11/18)  - jerome Catheter in place, discussed voiding trial with hospitalist, last day of abx today-- voiding trial either tomorrow or friday    # UTI  -- u/a +  -- on cipro, discontinued 11/20  - 11/20 started macrobid due to sensitivities. Last day 11/27  -- urine Cx +Enteroccocus  --may likely be contributing to  retention      # Normocytic anemia (stable) vs Iron deficiency anemia   - F/u outpatient colonoscopy for hx of colon polps  - Transfuse <7 PRN    - Iron studies: Transferrin: 11, TIBC: 170, Iron: 18, Ferritin: 414  - monitor H/H. Hgb 12.0 11/18 stable  - CBC 11/21--Hb stable     # RHETT  # Activity-Induced Hypoxia  - CXR- negative   - CPAP HS 5 50% O2. Poor compliance  - O2 via NC PRN  - Albuterol PRN    # Gout  - Colchicine 0.6mg daily  - Uric acid 11/12- 6.8     # Mood/Cognition  # Depression  - Paxil 30mg daily     # Sleep:   -cont. melatonin 9 qhs     # Pain Management:  - Tylenol PRN    # GI/Bowel:  - Senna QHS  - Miralax daily   - Bisacodyl suppository QD PRN  - simethicone for gas pain  - GI ppx: Pantoprazole 40mg daily   - constipation with abdominal discomfort: will give dulcolax suppository. If persists, AXR  - per SLP, will initiate free water protocol   - continue with aggressive bowel regimen to assist with urinary retention    # Dysphagia:  - S/p MBS on 11/12--> diet: Easy to chew; mild thick liquids- DASH  - 1:1 feeds                   70 y/o M w/ PMHx HLD, depression, GERD, gout, colonic polyps,  congenital absence of one kidney, recent cholecystitis s/p cholecystectomy (6/24), RHETT on CPAP orginially presented to [facility] on 11/3/24 with 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  S/p L ICA terminus occulusion a/p L ICA thrombectomy,  w/ underling L petrous ICA dissection     # L MCA stroke w/ hemorrhagic conversion  # L petrous ICA dissection w/ pesudoaneurysm  - CTH 11/9: evolving acute to subacute left MCA infarct with evolving left basal ganglia hemorrhagic transformation, new punctuate foci of increased hyperdensity. Stable mass effect and 5mm rightward midline shift  - Aspirin 325mg daily  - Plavix 75mg daily   - Rosuvastatin 5mg QD  - outpatient cardiology f/u for w/u r/o cardioembolic source  - continue comprehensive rehab program, 3 hours a day, 5 days a week.    Aphasia  - C/w Aricept 5 mg 11/26-- monitor for SE- abdominal pain, n/v-- no reported symptoms     HTN---Orthostatic Hypotension  -- Amlodipine dc'd  --  losartan to 25mg daily--discontined  -- Monitor BP--111/65 this AM    # R Soleal Vein DVT  - Duplex U/S showed DVT below the knee, R soleal vein thrombosis 11/14  - Lovenox 40 mg SubQ daily 11/15  - f/u weekly dopplers, last doppler 11/21 shows Persistent right soleal vein DVT. REPEAT 11/29     #Urinary Retention, unimproved  -continue bladder scans  -UA without gross contamination, but per hospitalist will start cipro 11/18 empirically and send urine culture.  UCx resulted with cipro resistance--macrobid until 11/27  -bowels reg.   - flomax 0.4 (11/18)  - jerome Catheter in place, discussed voiding trial with hospitalist, last day of abx today-- voiding trial either tomorrow or friday    # UTI  -- u/a +  -- on cipro, discontinued 11/20  - 11/20 started macrobid due to sensitivities. Last day 11/27  -- urine Cx +Enteroccocus  --may likely be contributing to  retention    # Normocytic anemia (stable) vs Iron deficiency anemia   - F/u outpatient colonoscopy for hx of colon polps  - Transfuse <7 PRN    - Iron studies: Transferrin: 11, TIBC: 170, Iron: 18, Ferritin: 414  - monitor H/H. Hgb 12.0 11/18 stable  - CBC 11/21--Hb stable     # RHETT  # Activity-Induced Hypoxia  - CXR- negative   - CPAP HS 5 50% O2. Poor compliance  - O2 via NC PRN  - Albuterol PRN    # Gout  - Colchicine 0.6mg daily  - Uric acid 11/12- 6.8     # Mood/Cognition  # Depression  - Paxil 30mg daily     # Sleep:   -cont. melatonin 9 qhs     # Pain Management:  - Tylenol PRN    # GI/Bowel:  - Senna QHS  - Miralax daily   - Bisacodyl suppository QD PRN  - simethicone for gas pain  - GI ppx: Pantoprazole 40mg daily     # Dysphagia:  - MBS  yesterday-- upgraded to regular and thins   - 1:1 feeds

## 2024-11-27 NOTE — PROGRESS NOTE ADULT - ASSESSMENT
TARUN CRESPO is a 68 y/o M w/ PMHx HLD, depression, GERD, gout, colonic polyps,  congenital absence of one kidney, recent cholecystitis s/p cholecystectomy (6/24), RHETT on CPAP orginially presented to [facility] on 11/3 with aphasia, R sided weakness and R facial droop and vigorous cough x 2 weeks s/p COVID vaccine. 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  S/p L ICA terminus occulusion a/p L ICA thrombectomy, no stent, 5 passes TICI 2C  w/ underling L petrous ICA dissection on 11/3 w/ Dr. March. Now admitted to [facility] for functional deficits for initiation of a multidisciplinary rehab program     #L MCA stroke w/ hemorrhagic conversion  #L petrous ICA dissection w/ pesudoaneurysm  -CTH 11/3: hyperdense appearance of L basal ganglia and L frontal lobe gyri, mild LCA edema 2/2 recent ischemia  -CTA H/N 11/3: no evidence of significant stenosis affecting the extra cranial carotid or vertebral arteries. No evidence of aneurysm or significant vascular stenosis at [place]  -Repeat CTH 11/4: post recent endovascular revascularization of the left internal carotid artery and left middle cerebral artery. Minimally less conspicuous hyperdense appearance of left basal ganglia and left frontal lobe gyri which may represent expected evolution of contrast staining  -MRI brain 11/5: evolving recent infarct in L ICA associated with hemorrhagic transformation of the L basal ganglia. Effacement of cerebral sulci with mass effect causing 0.6 mm rightward shift.   -CTH 11/9: evolving acute to subacute left MCA infarct with evolving left basal ganglia hemorrhagic transformation, new punctuate foci of increased hyperdensity. Stable mass effect and 5mm rightward midline shift  -CTA 11/9: stable left internal carotid artery pseudoaneurysm immediately inferior to the petrous portion of the L ICA at the area of focal dissection.  -S/p L ICA terminus occulusion a/p L ICA thrombectomy, no stent, 5 passes TICI 2C  w/ underling L petrous ICA dissection on 11/3 w/ Dr. March.   -A1c 5.0; LDL 60  -Aspirin 325mg daily  -Plavix 75mg daily   rosuvastatin 5mg QD  -Will be on DAPT for 3 months and follow up with outpt neurology  -F/u outpatient cardiology for Zio-patch to r/o cardioembolic source --ILR to be considered as outpatient    #Urinary Retention, repeated PVR > 500cc,   - jerome inserted  - continue flomax  - treat constipation aggressively   - Macrobid since 11/20 for UTI ( E. Faecalis ), afebrile, no leukocytosis on labs. Completed 7 day course      # Normocytic anemia (stable)   -Monitor H/H (12.2/37.2- on 11/14), most recent Hb 11.6  -F/u outpatient colonoscopy for hx of colon polps  -Transfuse <7 PRN    -Iron studies: Transferrin: 11, TIBC: 170, Iron: 18, Ferritin: 414    # Primary HTN   - BP has been stable without meds  - D/C Losartan  - continue to monitor and adjust meds as needed      #RHETT  # Hypoxemia  -CPAP HS  -Albuterol PRN  -On Flovent at home (not available here)  - weaned off Oxygen and saturating well on RA  - pulmonary consult reviewed  - Incentive spirometry    #Gout  -Colchicine 0.6mg daily  -Uric acid 11/12- 6.8     # R Soleal Vein DVT  - Duplex U/S showed DVT below the knee, R soleal vein thrombosis 11/14  - Aspirin 325 mg daily  - Plavix 75 mg daily  - Lovenox 40 mg SubQ daily 11/15  - repeat doppler 11/21 with persistent DVT, no propagation.   --f/u weekly dopplers.     #Depression  -Paxil 30mg daily     # Hypernatremia  - resolved  - continue to monitor     TARUN CRESPO is a 68 y/o M w/ PMHx HLD, depression, GERD, gout, colonic polyps,  congenital absence of one kidney, recent cholecystitis s/p cholecystectomy (6/24), RHETT on CPAP orginially presented to [facility] on 11/3 with aphasia, R sided weakness and R facial droop and vigorous cough x 2 weeks s/p COVID vaccine. 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  S/p L ICA terminus occulusion a/p L ICA thrombectomy, no stent, 5 passes TICI 2C  w/ underling L petrous ICA dissection on 11/3 w/ Dr. March. Now admitted to [facility] for functional deficits for initiation of a multidisciplinary rehab program     #L MCA stroke w/ hemorrhagic conversion  #L petrous ICA dissection w/ pesudoaneurysm  -CTH 11/3: hyperdense appearance of L basal ganglia and L frontal lobe gyri, mild LCA edema 2/2 recent ischemia  -CTA H/N 11/3: no evidence of significant stenosis affecting the extra cranial carotid or vertebral arteries. No evidence of aneurysm or significant vascular stenosis at [place] of [place]  -Repeat CTH 11/4: post recent endovascular revascularization of the left internal carotid artery and left middle cerebral artery. Minimally less conspicuous hyperdense appearance of left basal ganglia and left frontal lobe gyri which may represent expected evolution of contrast staining  -MRI brain 11/5: evolving recent infarct in L ICA associated with hemorrhagic transformation of the L basal ganglia. Effacement of cerebral sulci with mass effect causing 0.6 mm rightward shift.   -CTH 11/9: evolving acute to subacute left MCA infarct with evolving left basal ganglia hemorrhagic transformation, new punctuate foci of increased hyperdensity. Stable mass effect and 5mm rightward midline shift  -CTA 11/9: stable left internal carotid artery pseudoaneurysm immediately inferior to the petrous portion of the L ICA at the area of focal dissection.  -S/p L ICA terminus occulusion a/p L ICA thrombectomy, no stent, 5 passes TICI 2C  w/ underling L petrous ICA dissection on 11/3 w/ Dr. March.   -A1c 5.0; LDL 60  -Aspirin 325mg daily  -Plavix 75mg daily   rosuvastatin 5mg QD  -Will be on DAPT for 3 months and follow up with outpt neurology  -F/u outpatient cardiology for Zio-patch to r/o cardioembolic source --ILR to be considered as outpatient    #Urinary Retention, repeated PVR > 500cc,   - jerome inserted  - continue flomax  - treat constipation aggressively   - Macrobid since 11/20 for UTI ( E. Faecalis ), afebrile, no leukocytosis on labs. Completed 7 day course  - plan for voiding trial in the next few days    #Elevated PSA  - pt will need o/p F/U with Urology      # Normocytic anemia (stable)   -Monitor H/H (12.2/37.2- on 11/14), most recent Hb 11.6  -F/u outpatient colonoscopy for hx of colon polps  -Transfuse <7 PRN    -Iron studies: Transferrin: 11, TIBC: 170, Iron: 18, Ferritin: 414    # Primary HTN   - BP has been stable without meds  - D/C Losartan  - continue to monitor and adjust meds as needed      #RHETT  # Hypoxemia  -CPAP HS  -Albuterol PRN  -On Flovent at home (not available here)  - weaned off Oxygen and saturating well on RA  - pulmonary consult reviewed  - Incentive spirometry    #Gout  -Colchicine 0.6mg daily  -Uric acid 11/12- 6.8     # R Soleal Vein DVT  - Duplex U/S showed DVT below the knee, R soleal vein thrombosis 11/14  - Aspirin 325 mg daily  - Plavix 75 mg daily  - Lovenox 40 mg SubQ daily 11/15  - repeat doppler 11/21 with persistent DVT, no propagation.   --f/u weekly dopplers.     #Depression  -Paxil 30mg daily     # Hypernatremia  - resolved  - continue to monitor

## 2024-11-27 NOTE — PROGRESS NOTE ADULT - SUBJECTIVE AND OBJECTIVE BOX
Patient is a 69y old  Male who presents with a chief complaint of L MCA infarct w/ hemorrhagic conversion (27 Nov 2024 09:35)      HPI:  68 y/o M w/ PMHx HLD, depression, GERD, gout, colonic polyps,  congenital absence of one kidney, recent cholecystitis s/p cholecystectomy (6/24), RHETT on CPAP orginially presented to [facility] on 11/3 with aphasia, R sided weakness and R facial droop and vigorous cough x 2 weeks s/p COVID vaccine. LKW 2045 on 11/2. 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 Patient transferred to [facility] for mechanical thrombectomy. S/p L ICA terminus occulusion a/p L ICA thrombectomy, no stent, 5 passes TICI 2C  w/ underling L petrous ICA dissection on 11/3 w/ Dr. March. Post op course c/b slight pseudoaneurysm enlargement w/ dissection distal embolisim into the terminal ICA seen on 11/4 CTA and hemorrhagic transformation of L MCA infarct.   Of note, hospital course also significant for fevers, suspect aspiration pneumonitis treated with Rocephin x 1, and dysphagia requiring easy to chew,  with mod thick liquids. EP was consulted with concerns of cardioembolic source for infarct, recommended to f/u outpatient for Zio patch.   Patient had an MBS on 11/12 which showed moderate oropharyngeal dysphagia, remarkable for delayed/reduced laryngeal vestibular closure, pharyngeal trigger delay and reduced hyolaryngeal elevation/excursion.  There was deep laryngeal penetration and possibly aspiration of thin liquid.  Patient was cleared for easy to chew GI diet with mildly thick liquids.    Patient optimized and was evaluated by PM&R and therapy for functional deficits, gait/ADL impairments and acute rehabilitation was recommended. Patient was cleared for discharge to [facility] IRU on 11/13/24.  (13 Nov 2024 15:44)      SUBJECTIVE: Patient seen and examined. No acute overnight events, per nursing patient slept. Patient with severe aphasia, able to nod yes/shakes head in response to questions.      REVIEW OF SYSTEMS  right hemiparesis  right lip droop  +urinary retention    VITALS  69y  Vital Signs Last 24 Hrs  T(C): 36.5 (27 Nov 2024 07:12), Max: 36.6 (26 Nov 2024 19:48)  T(F): 97.7 (27 Nov 2024 07:12), Max: 97.8 (26 Nov 2024 19:48)  HR: 57 (27 Nov 2024 08:15) (56 - 64)  BP: 126/70 (27 Nov 2024 07:12) (111/65 - 126/70)  BP(mean): --  RR: 16 (27 Nov 2024 07:12) (16 - 16)  SpO2: 97% (27 Nov 2024 08:15) (95% - 97%)    Parameters below as of 27 Nov 2024 08:15  Patient On (Oxygen Delivery Method): room air      Daily     Daily       PHYSICAL EXAM  Constitutional - NAD, Comfortable   HEENT - NCAT, EOMI   Pulm- breathing comfortably   Cardio - warm and well perfused  Abdomen -  Soft, NTND   : Brito in place  Extremities -  No calf tenderness    Neurologic Exam:                     Cognitive -              Orientation: Awake and alert             Attention:  limited due to aphasia     Speech: Nonfluent aphasia, able to make sounds     Cranial Nerves + left lip droop, +left facial weakness     Motor -                      LEFT    UE - ShAB 5/5, EF 5/5, EE 5/5, WE 5/5,  5/5                     RIGHT UE - ShAB 0/5, EF 0/5, EE 0/5, WE 0/5,  0/5                     LEFT    LE - HF 5/5, KE 5/5, DF 5/5, PF 5/5                     RIGHT LE - HF 0/5, KE 0/5, DF 0/5, PF 0/5      Psychiatric - Mood stable      MEDICATIONS:  MEDICATIONS  (STANDING):  albuterol    90 MICROgram(s) HFA Inhaler 2 Puff(s) Inhalation every 6 hours  aspirin 325 milliGRAM(s) Oral daily  cholecalciferol 1000 Unit(s) Oral daily  clopidogrel Tablet 75 milliGRAM(s) Oral daily  colchicine 0.6 milliGRAM(s) Oral daily  donepezil 5 milliGRAM(s) Oral with breakfast  enoxaparin Injectable 40 milliGRAM(s) SubCutaneous every 24 hours  melatonin 9 milliGRAM(s) Oral at bedtime  pantoprazole    Tablet 40 milliGRAM(s) Oral before breakfast  PARoxetine 30 milliGRAM(s) Oral daily  polyethylene glycol 3350 17 Gram(s) Oral daily  rosuvastatin 5 milliGRAM(s) Oral daily  senna 2 Tablet(s) Oral at bedtime  tamsulosin 0.4 milliGRAM(s) Oral at bedtime    MEDICATIONS  (PRN):  acetaminophen     Tablet .. 650 milliGRAM(s) Oral every 6 hours PRN Mild Pain (1 - 3)  atropine 1% Ophthalmic Solution for SubLingual Use 1 Drop(s) SubLingual every 8 hours PRN cough, and increased upper airway secretions  bisacodyl Suppository 10 milliGRAM(s) Rectal daily PRN Constipation  simethicone 80 milliGRAM(s) Chew four times a day PRN Dyspepsia

## 2024-11-27 NOTE — PROGRESS NOTE ADULT - SUBJECTIVE AND OBJECTIVE BOX
CC: Patient is a 69y old  Male who presents with a chief complaint of L MCA infarct w/ hemorrhagic conversion (26 Nov 2024 09:46)      Interval History:  Patient seen and examined at bedside.  No overnight events  No complaints this morning  Pt is off Oxygen, denies SOB. No CP, abd pain, N/V/D    ALLERGIES:  penicillin (Swelling)    MEDICATIONS  (STANDING):  albuterol    90 MICROgram(s) HFA Inhaler 2 Puff(s) Inhalation every 6 hours  aspirin 325 milliGRAM(s) Oral daily  cholecalciferol 1000 Unit(s) Oral daily  clopidogrel Tablet 75 milliGRAM(s) Oral daily  colchicine 0.6 milliGRAM(s) Oral daily  donepezil 5 milliGRAM(s) Oral with breakfast  enoxaparin Injectable 40 milliGRAM(s) SubCutaneous every 24 hours  losartan 25 milliGRAM(s) Oral daily  melatonin 9 milliGRAM(s) Oral at bedtime  pantoprazole    Tablet 40 milliGRAM(s) Oral before breakfast  PARoxetine 30 milliGRAM(s) Oral daily  polyethylene glycol 3350 17 Gram(s) Oral daily  rosuvastatin 5 milliGRAM(s) Oral daily  senna 2 Tablet(s) Oral at bedtime  tamsulosin 0.4 milliGRAM(s) Oral at bedtime    MEDICATIONS  (PRN):  acetaminophen     Tablet .. 650 milliGRAM(s) Oral every 6 hours PRN Mild Pain (1 - 3)  atropine 1% Ophthalmic Solution for SubLingual Use 1 Drop(s) SubLingual every 8 hours PRN cough, and increased upper airway secretions  bisacodyl Suppository 10 milliGRAM(s) Rectal daily PRN Constipation  simethicone 80 milliGRAM(s) Chew four times a day PRN Dyspepsia    Vital Signs Last 24 Hrs  T(F): 97.7 (27 Nov 2024 07:12), Max: 97.8 (26 Nov 2024 19:48)  HR: 57 (27 Nov 2024 07:12) (56 - 64)  BP: 126/70 (27 Nov 2024 07:12) (111/65 - 126/70)  RR: 16 (27 Nov 2024 07:12) (16 - 16)  SpO2: 97% (27 Nov 2024 07:12) (95% - 97%)  I&O's Summary        PHYSICAL EXAM:    General: NAD, awake, alert   ENT: MMM, no tonsilar exudate  Neck: Supple, No JVD  Lungs: Clear to auscultation bilaterally, no wheezes. Good air entry bilaterally   Cardio: RRR, S1/S2, No murmurs  Abdomen: Soft, Nontender, Nondistended; Bowel sounds present  Extremities: No calf tenderness,  Trace b/l pitting edema    LABS:                        11.6   6.67  )-----------( 222      ( 25 Nov 2024 06:35 )             34.6       11-25    142  |  106  |  15  ----------------------------<  102  4.0   |  31  |  0.95    Ca    8.9      25 Nov 2024 06:35    TPro  6.0  /  Alb  2.6  /  TBili  0.5  /  DBili  x   /  AST  23  /  ALT  24  /  AlkPhos  56  11-25                                  Urinalysis Basic - ( 25 Nov 2024 06:35 )    Color: x / Appearance: x / SG: x / pH: x  Gluc: 102 mg/dL / Ketone: x  / Bili: x / Urobili: x   Blood: x / Protein: x / Nitrite: x   Leuk Esterase: x / RBC: x / WBC x   Sq Epi: x / Non Sq Epi: x / Bacteria: x        COVID-19 PCR: NotDetenadeem (11-13-24 @ 22:33)      Care Discussed with Consultants/Other Providers: Yes. Rehab provider

## 2024-11-28 DIAGNOSIS — E78.5 HYPERLIPIDEMIA, UNSPECIFIED: ICD-10-CM

## 2024-11-28 DIAGNOSIS — G47.33 OBSTRUCTIVE SLEEP APNEA (ADULT) (PEDIATRIC): ICD-10-CM

## 2024-11-28 DIAGNOSIS — I10 ESSENTIAL (PRIMARY) HYPERTENSION: ICD-10-CM

## 2024-11-28 DIAGNOSIS — I63.9 CEREBRAL INFARCTION, UNSPECIFIED: ICD-10-CM

## 2024-11-28 PROCEDURE — 99232 SBSQ HOSP IP/OBS MODERATE 35: CPT

## 2024-11-28 RX ADMIN — PAROXETINE HYDROCHLORIDE HEMIHYDRATE 30 MILLIGRAM(S): 37.5 TABLET, FILM COATED, EXTENDED RELEASE ORAL at 11:10

## 2024-11-28 RX ADMIN — ROSUVASTATIN CALCIUM 5 MILLIGRAM(S): 5 TABLET, FILM COATED ORAL at 11:11

## 2024-11-28 RX ADMIN — ACETAMINOPHEN 500MG 650 MILLIGRAM(S): 500 TABLET, COATED ORAL at 23:01

## 2024-11-28 RX ADMIN — CLOPIDOGREL 75 MILLIGRAM(S): 75 TABLET, FILM COATED ORAL at 11:10

## 2024-11-28 RX ADMIN — Medication 80 MILLIGRAM(S): at 11:11

## 2024-11-28 RX ADMIN — ACETAMINOPHEN 500MG 650 MILLIGRAM(S): 500 TABLET, COATED ORAL at 11:11

## 2024-11-28 RX ADMIN — ACETAMINOPHEN, DIPHENHYDRAMINE HCL, PHENYLEPHRINE HCL 9 MILLIGRAM(S): 325; 25; 5 TABLET ORAL at 21:52

## 2024-11-28 RX ADMIN — Medication 2 PUFF(S): at 16:51

## 2024-11-28 RX ADMIN — Medication 325 MILLIGRAM(S): at 11:10

## 2024-11-28 RX ADMIN — Medication 1000 UNIT(S): at 11:10

## 2024-11-28 RX ADMIN — Medication 0.6 MILLIGRAM(S): at 11:10

## 2024-11-28 RX ADMIN — PANTOPRAZOLE SODIUM 40 MILLIGRAM(S): 40 TABLET, DELAYED RELEASE ORAL at 05:43

## 2024-11-28 RX ADMIN — DONEPEZIL HYDROCHLORIDE 5 MILLIGRAM(S): 5 TABLET, FILM COATED ORAL at 07:48

## 2024-11-28 RX ADMIN — ACETAMINOPHEN 500MG 650 MILLIGRAM(S): 500 TABLET, COATED ORAL at 12:11

## 2024-11-28 RX ADMIN — TAMSULOSIN HYDROCHLORIDE 0.4 MILLIGRAM(S): 0.4 CAPSULE ORAL at 21:52

## 2024-11-28 RX ADMIN — ENOXAPARIN SODIUM 40 MILLIGRAM(S): 30 INJECTION SUBCUTANEOUS at 17:15

## 2024-11-28 NOTE — PROGRESS NOTE ADULT - SUBJECTIVE AND OBJECTIVE BOX
Cc: Gait dysfunction    HPI: Patient resting comfortably.    Has been tolerating rehabilitation program.    acetaminophen     Tablet .. 650 milliGRAM(s) Oral every 6 hours PRN  albuterol    90 MICROgram(s) HFA Inhaler 2 Puff(s) Inhalation every 6 hours  aspirin 325 milliGRAM(s) Oral daily  atropine 1% Ophthalmic Solution for SubLingual Use 1 Drop(s) SubLingual every 8 hours PRN  bisacodyl Suppository 10 milliGRAM(s) Rectal daily PRN  cholecalciferol 1000 Unit(s) Oral daily  clopidogrel Tablet 75 milliGRAM(s) Oral daily  colchicine 0.6 milliGRAM(s) Oral daily  donepezil 5 milliGRAM(s) Oral with breakfast  enoxaparin Injectable 40 milliGRAM(s) SubCutaneous every 24 hours  melatonin 9 milliGRAM(s) Oral at bedtime  pantoprazole    Tablet 40 milliGRAM(s) Oral before breakfast  PARoxetine 30 milliGRAM(s) Oral daily  polyethylene glycol 3350 17 Gram(s) Oral daily  rosuvastatin 5 milliGRAM(s) Oral daily  senna 2 Tablet(s) Oral at bedtime  simethicone 80 milliGRAM(s) Chew four times a day PRN  tamsulosin 0.4 milliGRAM(s) Oral at bedtime      T(C): 36.4 (11-28-24 @ 07:52), Max: 36.7 (11-27-24 @ 21:40)  HR: 59 (11-28-24 @ 07:52) (59 - 62)  BP: 119/76 (11-28-24 @ 07:52) (117/65 - 119/76)  RR: 16 (11-28-24 @ 07:52) (16 - 16)  SpO2: 94% (11-28-24 @ 07:52) (94% - 95%)    In NAD  HEENT- EOMI  Heart- No cyanosis  Lungs- No respiratory distress   Abd- + BS, NT  Ext- No calf pain  Neuro- Exam unchanged        Imp: Patient with diagnosis of L MCA stroke w/ hemorrhagic conversion/L petrous ICA dissection w/ pesudoaneurysm admitted for comprehensive acute rehabilitation.    Plan:  -Impaired mobility - Continue PT/OT  - DVT prophylaxis - Lovenox  - Skin- Turn q2h, check skin daily  - Continue current medications; patient medically stable.   -Active issues-   -CVA - aspirin, plavix, rosuvastatin  -Constipation -  miralax, senna  -Appreciate internal medicine consultation   - Patient to continue current rehabilitation program.

## 2024-11-28 NOTE — PROGRESS NOTE ADULT - SUBJECTIVE AND OBJECTIVE BOX
Patient is a 69y old  Male who presents with a chief complaint of L MCA infarct w/ hemorrhagic conversion (28 Nov 2024 09:10)      History, interim events and clinically pertinent issues reviewed; patient interviewed and examined.  There were no acute medical issues reported and patient is without new complaints; his BP's were reportedly running low and BP meds held    ALLERGIES:  penicillin (Swelling)    MEDICATIONS  (STANDING):  albuterol    90 MICROgram(s) HFA Inhaler 2 Puff(s) Inhalation every 6 hours  aspirin 325 milliGRAM(s) Oral daily  cholecalciferol 1000 Unit(s) Oral daily  clopidogrel Tablet 75 milliGRAM(s) Oral daily  colchicine 0.6 milliGRAM(s) Oral daily  donepezil 5 milliGRAM(s) Oral with breakfast  enoxaparin Injectable 40 milliGRAM(s) SubCutaneous every 24 hours  melatonin 9 milliGRAM(s) Oral at bedtime  pantoprazole    Tablet 40 milliGRAM(s) Oral before breakfast  PARoxetine 30 milliGRAM(s) Oral daily  polyethylene glycol 3350 17 Gram(s) Oral daily  rosuvastatin 5 milliGRAM(s) Oral daily  senna 2 Tablet(s) Oral at bedtime  tamsulosin 0.4 milliGRAM(s) Oral at bedtime    MEDICATIONS  (PRN):  acetaminophen     Tablet .. 650 milliGRAM(s) Oral every 6 hours PRN Mild Pain (1 - 3)  atropine 1% Ophthalmic Solution for SubLingual Use 1 Drop(s) SubLingual every 8 hours PRN cough, and increased upper airway secretions  bisacodyl Suppository 10 milliGRAM(s) Rectal daily PRN Constipation  simethicone 80 milliGRAM(s) Chew four times a day PRN Dyspepsia    Vital Signs Last 24 Hrs  T(F): 97.5 (28 Nov 2024 07:52), Max: 98 (27 Nov 2024 21:40)  HR: 59 (28 Nov 2024 07:52) (59 - 62)  BP: 119/76 (28 Nov 2024 07:52) (117/65 - 119/76)  RR: 16 (28 Nov 2024 07:52) (16 - 16)  SpO2: 94% (28 Nov 2024 07:52) (94% - 95%)  I&O's Summary              PHYSICAL EXAM:  General: NAD  ENT: MMM  Neck: Supple, No JVD  Lungs: Clear to auscultation bilaterally  Cardio: RRR, S1/S2, No murmurs  Abdomen: Soft, Nontender, Nondistended; Bowel sounds present  Extremities: No calf tenderness, No pitting edema  Neuro : dysarthria expressive aphasia rt HP    LABS: There are no new laboratory or radiologic studies resulted at the time this progress note was authored                                              COVID-19 PCR: NotLaura (11-13-24 @ 22:33)

## 2024-11-28 NOTE — PROGRESS NOTE ADULT - SUBJECTIVE AND OBJECTIVE BOX
Follow-up Pulmonary Progress Note  Chief Complaint : Cerebral infarction      patient seen and examined  on room air   comfortable  cough better  no complaints        Allergies :penicillin (Swelling)      PAST MEDICAL & SURGICAL HISTORY:  GERD (gastroesophageal reflux disease)    Colon polyps    Major depression    Gout    Hypercholesteremia    RHETT on CPAP    S/P cholecystectomy    S/P cataract surgery    S/P skin cancer resection    H/O total hip arthroplasty        Medications:  MEDICATIONS  (STANDING):  albuterol    90 MICROgram(s) HFA Inhaler 2 Puff(s) Inhalation every 6 hours  aspirin 325 milliGRAM(s) Oral daily  cholecalciferol 1000 Unit(s) Oral daily  clopidogrel Tablet 75 milliGRAM(s) Oral daily  colchicine 0.6 milliGRAM(s) Oral daily  donepezil 5 milliGRAM(s) Oral with breakfast  enoxaparin Injectable 40 milliGRAM(s) SubCutaneous every 24 hours  melatonin 9 milliGRAM(s) Oral at bedtime  pantoprazole    Tablet 40 milliGRAM(s) Oral before breakfast  PARoxetine 30 milliGRAM(s) Oral daily  polyethylene glycol 3350 17 Gram(s) Oral daily  rosuvastatin 5 milliGRAM(s) Oral daily  senna 2 Tablet(s) Oral at bedtime  tamsulosin 0.4 milliGRAM(s) Oral at bedtime    MEDICATIONS  (PRN):  acetaminophen     Tablet .. 650 milliGRAM(s) Oral every 6 hours PRN Mild Pain (1 - 3)  atropine 1% Ophthalmic Solution for SubLingual Use 1 Drop(s) SubLingual every 8 hours PRN cough, and increased upper airway secretions  bisacodyl Suppository 10 milliGRAM(s) Rectal daily PRN Constipation  simethicone 80 milliGRAM(s) Chew four times a day PRN Dyspepsia      Antibiotics History  ciprofloxacin     Tablet 500 milliGRAM(s) Oral every 12 hours, 11-18-24 @ 11:33, Stop order after: 5 Days  nitrofurantoin monohydrate/macrocrystals (MACROBID) 100 milliGRAM(s) Oral two times a day, 11-20-24 @ 11:49, Stop order after: 7 Days      Heme Medications   clopidogrel Tablet 75 milliGRAM(s) Oral daily, 11-14-24 @ 00:00  enoxaparin Injectable 40 milliGRAM(s) SubCutaneous every 24 hours, 11-15-24 @ 12:31      GI Medications  bisacodyl Suppository 10 milliGRAM(s) Rectal daily, 11-13-24 @ 21:43, Routine PRN  pantoprazole    Tablet 40 milliGRAM(s) Oral before breakfast, 11-13-24 @ 21:44, Routine  polyethylene glycol 3350 17 Gram(s) Oral daily, 11-25-24 @ 14:38, Routine  senna 2 Tablet(s) Oral at bedtime, 11-19-24 @ 13:55,   simethicone 80 milliGRAM(s) Chew four times a day, 11-21-24 @ 12:52, Now PRN               CULTURES: (if applicable)    Culture - Urine (collected 11-18-24 @ 10:20)  Source: Catheterized Catheterized  Final Report (11-20-24 @ 10:44):    10,000 - 49,000 CFU/mL Enterococcus faecalis  Organism: Enterococcus faecalis (11-20-24 @ 10:44)  Organism: Enterococcus faecalis (11-20-24 @ 10:44)      Method Type: ROB      -  Ampicillin: S <=2 Predicts results to ampicillin/sulbactam, amoxacillin-clavulanate and  piperacillin-tazobactam.      -  Ciprofloxacin: R >2      -  Levofloxacin: R >4      -  Nitrofurantoin: S <=32 Should not be used to treat pyelonephritis.      -  Tetracycline: R >8      -  Vancomycin: S 1           VITALS:  T(C): 36.4 (11-28-24 @ 07:52), Max: 36.7 (11-27-24 @ 21:40)  T(F): 97.5 (11-28-24 @ 07:52), Max: 98 (11-27-24 @ 21:40)  HR: 59 (11-28-24 @ 07:52) (59 - 62)  BP: 119/76 (11-28-24 @ 07:52) (117/65 - 119/76)  BP(mean): --  ABP: --  ABP(mean): --  RR: 16 (11-28-24 @ 07:52) (16 - 16)  SpO2: 94% (11-28-24 @ 07:52) (94% - 95%)  CVP(mm Hg): --  CVP(cm H2O): --    Ins and Outs

## 2024-11-28 NOTE — PROGRESS NOTE ADULT - ASSESSMENT
Physical Examination:  GENERAL:               Alert, Oriented, No acute distress.    HEENT:                    Right facial droop No JVD, dry MM  PULM:                     Bilateral air entry, transmitted sounds to auscultation bilaterally, no significant sputum production, no Rales, No Rhonchi, No Wheezing  CVS:                         S1, S2,  No Murmur  ABD:                        Soft, nondistended, nontender, normoactive bowel sounds,    NEURO:                  Alert, oriented, interactive, RHP , follows commands, aphasic  PSYC:                      Calm, suspected Insight.    Assessment  1. Hypoxemia suspect from atelectasis, RHETT and increased upper airway secretions  2. s/p L MCA CVA with hemorragic conversion  3. Obesity with RHETT  4. R soleal Vein DVT     Plan  n/c o2 to maintain sat maintain sat > 90%  currently on room air   start incentive spirometry     CPAP use as per patient desire, as unable to pull off mask, may benefit from only n/c o2 QHS  aspiration precautions  Pulmonary toilet as needed  will up as needed      continue DVT ppx dose Lovenox for R soleal vein dvt, repeat US LE 1 - repeat us remain stable

## 2024-11-28 NOTE — PROGRESS NOTE ADULT - ASSESSMENT
TARUN CRESPO is a 70 y/o M w/ PMHx HLD, depression, GERD, gout, colonic polyps,  congenital absence of one kidney, recent cholecystitis s/p cholecystectomy (6/24), RHETT on CPAP orginially presented to [facility] on 11/3 with aphasia, R sided weakness and R facial droop and vigorous cough x 2 weeks s/p COVID vaccine. 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  S/p L ICA terminus occulusion a/p L ICA thrombectomy, no stent, 5 passes TICI 2C  w/ underling L petrous ICA dissection on 11/3 w/ Dr. March. Now admitted to [facility] for functional deficits for initiation of a multidisciplinary rehab program     #L MCA stroke w/ hemorrhagic conversion  #L petrous ICA dissection w/ pesudoaneurysm  -CTH 11/3: hyperdense appearance of L basal ganglia and L frontal lobe gyri, mild LCA edema 2/2 recent ischemia  -CTA H/N 11/3: no evidence of significant stenosis affecting the extra cranial carotid or vertebral arteries. No evidence of aneurysm or significant vascular stenosis at [place]  -Repeat CTH 11/4: post recent endovascular revascularization of the left internal carotid artery and left middle cerebral artery. Minimally less conspicuous hyperdense appearance of left basal ganglia and left frontal lobe gyri which may represent expected evolution of contrast staining  -MRI brain 11/5: evolving recent infarct in L ICA associated with hemorrhagic transformation of the L basal ganglia. Effacement of cerebral sulci with mass effect causing 0.6 mm rightward shift.   -CTH 11/9: evolving acute to subacute left MCA infarct with evolving left basal ganglia hemorrhagic transformation, new punctuate foci of increased hyperdensity. Stable mass effect and 5mm rightward midline shift  -CTA 11/9: stable left internal carotid artery pseudoaneurysm immediately inferior to the petrous portion of the L ICA at the area of focal dissection.  -S/p L ICA terminus occulusion a/p L ICA thrombectomy, no stent, 5 passes TICI 2C  w/ underling L petrous ICA dissection on 11/3 w/ Dr. March.   -A1c 5.0; LDL 60  -Aspirin 325mg daily  -Plavix 75mg daily   rosuvastatin 5mg QD  -Will be on DAPT for 3 months and follow up with outpt neurology  -F/u outpatient cardiology for Zio-patch to r/o cardioembolic source --ILR to be considered as outpatient    #Urinary Retention, repeated PVR > 500cc,   - jerome inserted  - continue flomax  - treat constipation aggressively   - Macrobid since 11/20 for UTI ( E. Faecalis ), afebrile, no leukocytosis on labs. Completed 7 day course  - plan for voiding trial in the next few days    #Elevated PSA  - pt will need o/p F/U with Urology      # Normocytic anemia (stable)   -Monitor H/H (12.2/37.2- on 11/14), most recent Hb 11.6  -F/u outpatient colonoscopy for hx of colon polps  -Transfuse <7 PRN    -Iron studies: Transferrin: 11, TIBC: 170, Iron: 18, Ferritin: 414    # Primary HTN   - BP has been stable without meds  - D/C Losartan  - continue to monitor and adjust meds as needed      #RHETT  # Hypoxemia  -CPAP HS  -Albuterol PRN  -On Flovent at home (not available here)  - weaned off Oxygen and saturating well on RA  - pulmonary consult reviewed  - Incentive spirometry    #Gout  -Colchicine 0.6mg daily  -Uric acid 11/12- 6.8     # R Soleal Vein DVT  - Duplex U/S showed DVT below the knee, R soleal vein thrombosis 11/14  - Aspirin 325 mg daily  - Plavix 75 mg daily  - Lovenox 40 mg SubQ daily 11/15  - repeat doppler 11/21 with persistent DVT, no propagation.   --f/u weekly dopplers.     #Depression  -Paxil 30mg daily     # Hypernatremia  - resolved  - continue to monitor

## 2024-11-29 ENCOUNTER — TRANSCRIPTION ENCOUNTER (OUTPATIENT)
Age: 69
End: 2024-11-29

## 2024-11-29 LAB
ALBUMIN SERPL ELPH-MCNC: 2.5 G/DL — LOW (ref 3.3–5)
ALP SERPL-CCNC: 62 U/L — SIGNIFICANT CHANGE UP (ref 40–120)
ALT FLD-CCNC: 31 U/L — SIGNIFICANT CHANGE UP (ref 10–45)
ANION GAP SERPL CALC-SCNC: 8 MMOL/L — SIGNIFICANT CHANGE UP (ref 5–17)
AST SERPL-CCNC: 26 U/L — SIGNIFICANT CHANGE UP (ref 10–40)
BASOPHILS # BLD AUTO: 0.02 K/UL — SIGNIFICANT CHANGE UP (ref 0–0.2)
BASOPHILS NFR BLD AUTO: 0.4 % — SIGNIFICANT CHANGE UP (ref 0–2)
BILIRUB SERPL-MCNC: 0.4 MG/DL — SIGNIFICANT CHANGE UP (ref 0.2–1.2)
BUN SERPL-MCNC: 15 MG/DL — SIGNIFICANT CHANGE UP (ref 7–23)
CALCIUM SERPL-MCNC: 8.8 MG/DL — SIGNIFICANT CHANGE UP (ref 8.4–10.5)
CHLORIDE SERPL-SCNC: 108 MMOL/L — SIGNIFICANT CHANGE UP (ref 96–108)
CO2 SERPL-SCNC: 29 MMOL/L — SIGNIFICANT CHANGE UP (ref 22–31)
CREAT SERPL-MCNC: 1.02 MG/DL — SIGNIFICANT CHANGE UP (ref 0.5–1.3)
EGFR: 78 ML/MIN/1.73M2 — SIGNIFICANT CHANGE UP
EOSINOPHIL # BLD AUTO: 0.33 K/UL — SIGNIFICANT CHANGE UP (ref 0–0.5)
EOSINOPHIL NFR BLD AUTO: 6.4 % — HIGH (ref 0–6)
GLUCOSE SERPL-MCNC: 102 MG/DL — HIGH (ref 70–99)
HCT VFR BLD CALC: 32.8 % — LOW (ref 39–50)
HGB BLD-MCNC: 10.8 G/DL — LOW (ref 13–17)
IMM GRANULOCYTES NFR BLD AUTO: 0.2 % — SIGNIFICANT CHANGE UP (ref 0–0.9)
LYMPHOCYTES # BLD AUTO: 1.64 K/UL — SIGNIFICANT CHANGE UP (ref 1–3.3)
LYMPHOCYTES # BLD AUTO: 32 % — SIGNIFICANT CHANGE UP (ref 13–44)
MCHC RBC-ENTMCNC: 30.4 PG — SIGNIFICANT CHANGE UP (ref 27–34)
MCHC RBC-ENTMCNC: 32.9 G/DL — SIGNIFICANT CHANGE UP (ref 32–36)
MCV RBC AUTO: 92.4 FL — SIGNIFICANT CHANGE UP (ref 80–100)
MONOCYTES # BLD AUTO: 0.53 K/UL — SIGNIFICANT CHANGE UP (ref 0–0.9)
MONOCYTES NFR BLD AUTO: 10.3 % — SIGNIFICANT CHANGE UP (ref 2–14)
NEUTROPHILS # BLD AUTO: 2.6 K/UL — SIGNIFICANT CHANGE UP (ref 1.8–7.4)
NEUTROPHILS NFR BLD AUTO: 50.7 % — SIGNIFICANT CHANGE UP (ref 43–77)
NRBC # BLD: 0 /100 WBCS — SIGNIFICANT CHANGE UP (ref 0–0)
PLATELET # BLD AUTO: 190 K/UL — SIGNIFICANT CHANGE UP (ref 150–400)
POTASSIUM SERPL-MCNC: 3.6 MMOL/L — SIGNIFICANT CHANGE UP (ref 3.5–5.3)
POTASSIUM SERPL-SCNC: 3.6 MMOL/L — SIGNIFICANT CHANGE UP (ref 3.5–5.3)
PROT SERPL-MCNC: 5.7 G/DL — LOW (ref 6–8.3)
RBC # BLD: 3.55 M/UL — LOW (ref 4.2–5.8)
RBC # FLD: 12.5 % — SIGNIFICANT CHANGE UP (ref 10.3–14.5)
SODIUM SERPL-SCNC: 145 MMOL/L — SIGNIFICANT CHANGE UP (ref 135–145)
WBC # BLD: 5.13 K/UL — SIGNIFICANT CHANGE UP (ref 3.8–10.5)
WBC # FLD AUTO: 5.13 K/UL — SIGNIFICANT CHANGE UP (ref 3.8–10.5)

## 2024-11-29 PROCEDURE — 93970 EXTREMITY STUDY: CPT | Mod: 26

## 2024-11-29 PROCEDURE — 99233 SBSQ HOSP IP/OBS HIGH 50: CPT | Mod: GC

## 2024-11-29 PROCEDURE — 99232 SBSQ HOSP IP/OBS MODERATE 35: CPT

## 2024-11-29 RX ORDER — ATROPINE SULFATE 1 %
1 DROPS OPHTHALMIC (EYE)
Qty: 0 | Refills: 0 | DISCHARGE
Start: 2024-11-29

## 2024-11-29 RX ORDER — ROSUVASTATIN CALCIUM 5 MG/1
1 TABLET, FILM COATED ORAL
Qty: 0 | Refills: 0 | DISCHARGE
Start: 2024-11-29

## 2024-11-29 RX ORDER — SIMETHICONE 125 MG
1 CAPSULE ORAL
Qty: 0 | Refills: 0 | DISCHARGE
Start: 2024-11-29

## 2024-11-29 RX ORDER — POLYETHYLENE GLYCOL 3350 17 G/17G
17 POWDER, FOR SOLUTION ORAL
Qty: 0 | Refills: 0 | DISCHARGE
Start: 2024-11-29

## 2024-11-29 RX ORDER — ALBUTEROL 90 MCG
2 AEROSOL (GRAM) INHALATION
Qty: 0 | Refills: 0 | DISCHARGE
Start: 2024-11-29

## 2024-11-29 RX ORDER — SENNOSIDES 8.6 MG
2 TABLET ORAL AT BEDTIME
Refills: 0 | Status: DISCONTINUED | OUTPATIENT
Start: 2024-11-29 | End: 2024-12-05

## 2024-11-29 RX ORDER — PANTOPRAZOLE SODIUM 40 MG/1
1 TABLET, DELAYED RELEASE ORAL
Qty: 0 | Refills: 0 | DISCHARGE
Start: 2024-11-29

## 2024-11-29 RX ORDER — PAROXETINE HYDROCHLORIDE HEMIHYDRATE 37.5 MG/1
1 TABLET, FILM COATED, EXTENDED RELEASE ORAL
Qty: 0 | Refills: 0 | DISCHARGE
Start: 2024-11-29

## 2024-11-29 RX ORDER — ACETAMINOPHEN, DIPHENHYDRAMINE HCL, PHENYLEPHRINE HCL 325; 25; 5 MG/1; MG/1; MG/1
3 TABLET ORAL
Qty: 0 | Refills: 0 | DISCHARGE
Start: 2024-11-29

## 2024-11-29 RX ORDER — CHOLECALCIFEROL (VITAMIN D3) 10MCG/0.25
1000 DROPS ORAL
Qty: 0 | Refills: 0 | DISCHARGE
Start: 2024-11-29

## 2024-11-29 RX ORDER — DONEPEZIL HYDROCHLORIDE 5 MG/1
1 TABLET, FILM COATED ORAL
Qty: 0 | Refills: 0 | DISCHARGE
Start: 2024-11-29

## 2024-11-29 RX ORDER — ENOXAPARIN SODIUM 30 MG/.3ML
40 INJECTION SUBCUTANEOUS
Qty: 0 | Refills: 0 | DISCHARGE
Start: 2024-11-29

## 2024-11-29 RX ORDER — POLYETHYLENE GLYCOL 3350 17 G/17G
17 POWDER, FOR SOLUTION ORAL DAILY
Refills: 0 | Status: DISCONTINUED | OUTPATIENT
Start: 2024-11-29 | End: 2024-12-05

## 2024-11-29 RX ORDER — TIZANIDINE 4 MG/1
2 TABLET ORAL AT BEDTIME
Refills: 0 | Status: DISCONTINUED | OUTPATIENT
Start: 2024-11-29 | End: 2024-12-05

## 2024-11-29 RX ORDER — CLOPIDOGREL 75 MG/1
1 TABLET, FILM COATED ORAL
Qty: 0 | Refills: 0 | DISCHARGE
Start: 2024-11-29

## 2024-11-29 RX ORDER — COLCHICINE 0.6 MG
1 TABLET ORAL
Qty: 0 | Refills: 0 | DISCHARGE
Start: 2024-11-29

## 2024-11-29 RX ORDER — ACETAMINOPHEN 500MG 500 MG/1
2 TABLET, COATED ORAL
Qty: 0 | Refills: 0 | DISCHARGE
Start: 2024-11-29

## 2024-11-29 RX ORDER — TAMSULOSIN HYDROCHLORIDE 0.4 MG/1
1 CAPSULE ORAL
Qty: 0 | Refills: 0 | DISCHARGE
Start: 2024-11-29

## 2024-11-29 RX ORDER — SENNOSIDES 8.6 MG
2 TABLET ORAL
Qty: 0 | Refills: 0 | DISCHARGE
Start: 2024-11-29

## 2024-11-29 RX ADMIN — ACETAMINOPHEN 500MG 650 MILLIGRAM(S): 500 TABLET, COATED ORAL at 00:00

## 2024-11-29 RX ADMIN — PANTOPRAZOLE SODIUM 40 MILLIGRAM(S): 40 TABLET, DELAYED RELEASE ORAL at 05:33

## 2024-11-29 RX ADMIN — Medication 325 MILLIGRAM(S): at 11:22

## 2024-11-29 RX ADMIN — ACETAMINOPHEN, DIPHENHYDRAMINE HCL, PHENYLEPHRINE HCL 9 MILLIGRAM(S): 325; 25; 5 TABLET ORAL at 21:33

## 2024-11-29 RX ADMIN — ENOXAPARIN SODIUM 40 MILLIGRAM(S): 30 INJECTION SUBCUTANEOUS at 16:33

## 2024-11-29 RX ADMIN — ACETAMINOPHEN 500MG 650 MILLIGRAM(S): 500 TABLET, COATED ORAL at 20:00

## 2024-11-29 RX ADMIN — ACETAMINOPHEN 500MG 650 MILLIGRAM(S): 500 TABLET, COATED ORAL at 19:35

## 2024-11-29 RX ADMIN — DONEPEZIL HYDROCHLORIDE 5 MILLIGRAM(S): 5 TABLET, FILM COATED ORAL at 07:42

## 2024-11-29 RX ADMIN — Medication 2 PUFF(S): at 15:45

## 2024-11-29 RX ADMIN — Medication 0.6 MILLIGRAM(S): at 11:21

## 2024-11-29 RX ADMIN — TAMSULOSIN HYDROCHLORIDE 0.4 MILLIGRAM(S): 0.4 CAPSULE ORAL at 21:31

## 2024-11-29 RX ADMIN — CLOPIDOGREL 75 MILLIGRAM(S): 75 TABLET, FILM COATED ORAL at 11:22

## 2024-11-29 RX ADMIN — ROSUVASTATIN CALCIUM 5 MILLIGRAM(S): 5 TABLET, FILM COATED ORAL at 11:21

## 2024-11-29 RX ADMIN — TIZANIDINE 2 MILLIGRAM(S): 4 TABLET ORAL at 21:32

## 2024-11-29 RX ADMIN — Medication 1000 UNIT(S): at 11:23

## 2024-11-29 RX ADMIN — PAROXETINE HYDROCHLORIDE HEMIHYDRATE 30 MILLIGRAM(S): 37.5 TABLET, FILM COATED, EXTENDED RELEASE ORAL at 11:21

## 2024-11-29 NOTE — DISCHARGE NOTE PROVIDER - NSDCCPCAREPLAN_GEN_ALL_CORE_FT
PRINCIPAL DISCHARGE DIAGNOSIS  Diagnosis: CVA (cerebrovascular accident)  Assessment and Plan of Treatment: A stroke, also referred to as a cerebral vascular accident (CVA) or a brain attack, is an interruption in the flow of blood to cells in the brain. When the cells in the brain are deprived of oxygen, they die.   You were started on aspirin, plavix, rosuvastatin. Please follow up with cardiology and neurology at [facility].         SECONDARY DISCHARGE DIAGNOSES  Diagnosis: HTN (hypertension)  Assessment and Plan of Treatment:     Diagnosis: Orthostatic hypertension  Assessment and Plan of Treatment:     Diagnosis: DVT, lower extremity  Assessment and Plan of Treatment: You had a right soleal DVT on 11/14.    Diagnosis: Urinary retention  Assessment and Plan of Treatment: ? jerome    Diagnosis: Acute UTI  Assessment and Plan of Treatment: completed macrobid.    Diagnosis: RHETT on CPAP  Assessment and Plan of Treatment:     Diagnosis: Gout  Assessment and Plan of Treatment:      PRINCIPAL DISCHARGE DIAGNOSIS  Diagnosis: CVA (cerebrovascular accident)  Assessment and Plan of Treatment: A stroke, also referred to as a cerebral vascular accident (CVA) or a brain attack, is an interruption in the flow of blood to cells in the brain. When the cells in the brain are deprived of oxygen, they die.   You were started on aspirin, plavix, rosuvastatin. Please follow up with cardiology and neurology at [facility].         SECONDARY DISCHARGE DIAGNOSES  Diagnosis: HTN (hypertension)  Assessment and Plan of Treatment:     Diagnosis: Orthostatic hypertension  Assessment and Plan of Treatment:     Diagnosis: DVT, lower extremity  Assessment and Plan of Treatment: You had a right soleal DVT on 11/14.  Cont. Lovenox.  follow up with Vascular Surgery as an outpatient.    Diagnosis: Urinary retention  Assessment and Plan of Treatment: ? jerome    Diagnosis: Acute UTI  Assessment and Plan of Treatment: completed macrobid.    Diagnosis: RHETT on CPAP  Assessment and Plan of Treatment:     Diagnosis: Gout  Assessment and Plan of Treatment:

## 2024-11-29 NOTE — DISCHARGE NOTE PROVIDER - CARE PROVIDER_API CALL
Anca Martinez  [address], [address], [place], [address]  Phone: (   )    -  Fax: (   )    -  Follow Up Time: 1 week    France Christopher  Physical/Rehab Medicine  [address] [place] [address]  Phone: [phone]  Fax: [phone]  Follow Up Time: 1 month    Cardiology,   [facility]  Phone: (   )    -  Fax: (   )    -  Follow Up Time:

## 2024-11-29 NOTE — PROGRESS NOTE ADULT - ASSESSMENT
68 y/o M w/ PMHx HLD, depression, GERD, gout, colonic polyps,  congenital absence of one kidney, recent cholecystitis s/p cholecystectomy (6/24), RHETT on CPAP orginially presented to [facility] on 11/3/24 with 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  S/p L ICA terminus occulusion a/p L ICA thrombectomy,  w/ underling L petrous ICA dissection     # L MCA stroke w/ hemorrhagic conversion  # L petrous ICA dissection w/ pesudoaneurysm  - CTH 11/9: evolving acute to subacute left MCA infarct with evolving left basal ganglia hemorrhagic transformation, new punctuate foci of increased hyperdensity. Stable mass effect and 5mm rightward midline shift  - Aspirin 325mg daily  - Plavix 75mg daily   - Rosuvastatin 5mg QD  - outpatient cardiology f/u for w/u r/o cardioembolic source  - continue comprehensive rehab program, 3 hours a day, 5 days a week.    Aphasia  - C/w Aricept 5 mg 11/26-- monitor for SE- abdominal pain, n/v-- no reported symptoms     HTN---Orthostatic Hypotension  -- Amlodipine dc'd  --  losartan to 25mg daily--discontined  -- Monitor BP--1125/79 this AM    # R Soleal Vein DVT  - Duplex U/S showed DVT below the knee, R soleal vein thrombosis 11/14  - Lovenox 40 mg SubQ daily 11/15  - f/u weekly dopplers, last doppler 11/21 shows Persistent right soleal vein DVT. REPEAT 11/29 performed-- Deep venous thrombosis in a muscular branch to the right soleus, unchanged since 11/14/2024.    #Urinary Retention, unimproved  -UA without gross contamination, but per hospitalist will start cipro 11/18 empirically and send urine culture.  UCx resulted with cipro resistance--macrobid until 11/27  -bowels reg.   - flomax 0.4 (11/18)  - Patient completed macrobid on 11/27. Brito Catheter in place, discussed voiding trial with hospitalist voiding trial tonight, straight cath for bladder scans q4, straight cath PVR >400 cc    # UTI  -- u/a +  -- urine Cx +Enteroccocus  - Completed macrobid.    # Normocytic anemia (stable) vs Iron deficiency anemia   - F/u outpatient colonoscopy for hx of colon polps  - Transfuse <7 PRN    - Iron studies: Transferrin: 11, TIBC: 170, Iron: 18, Ferritin: 414  - CBC 11/29- HgB 10.8    # RHETT  # Activity-Induced Hypoxia  - CXR- negative   - CPAP HS 5 50% O2. Poor compliance  - O2 via NC PRN  - Albuterol PRN    # Gout  - Colchicine 0.6mg daily  - Uric acid 11/12- 6.8     # Mood/Cognition  # Depression  - Paxil 30mg daily     # Sleep:   -cont. melatonin 9 qhs     # Pain Management:  - Tylenol PRN    # GI/Bowel:  - Senna QHS  - Miralax daily   - Bisacodyl suppository QD PRN  - simethicone for gas pain  - GI ppx: Pantoprazole 40mg daily     # Dysphagia:  - MBS  yesterday-- upgraded to regular and thins   - 1:1 feeds

## 2024-11-29 NOTE — DISCHARGE NOTE PROVIDER - PROVIDER TOKENS
FREE:[LAST:[Hzaem],FIRST:[Shoirah],PHONE:[(   )    -],FAX:[(   )    -],ADDRESS:[[address] [facility], [place], [place], [facility]],FOLLOWUP:[1 week]],PROVIDER:[TOKEN:[[id number]],FOLLOWUP:[1 month]],FREE:[LAST:[Cardiology],PHONE:[(   )    -],FAX:[(   )    -],ADDRESS:[[facility]]]

## 2024-11-29 NOTE — DISCHARGE NOTE PROVIDER - NSDCDCMDCOMP_GEN_ALL_CORE
This document is complete and the patient is ready for discharge. 4 = No assist / stand by assistance

## 2024-11-29 NOTE — DISCHARGE NOTE PROVIDER - CARE PROVIDERS DIRECT ADDRESSES
,DirectAddress_Unknown,[email or web address],DirectAddress_Unknown

## 2024-11-29 NOTE — PROGRESS NOTE ADULT - SUBJECTIVE AND OBJECTIVE BOX
Patient is a 69y old  Male who presents with a chief complaint of L MCA infarct w/ hemorrhagic conversion (29 Nov 2024 08:29)      HPI:  68 y/o M w/ PMHx HLD, depression, GERD, gout, colonic polyps,  congenital absence of one kidney, recent cholecystitis s/p cholecystectomy (6/24), RHETT on CPAP orginially presented to [facility] on 11/3 with aphasia, R sided weakness and R facial droop and vigorous cough x 2 weeks s/p COVID vaccine. LKW 2045 on 11/2. 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 Patient transferred to [facility] for mechanical thrombectomy. S/p L ICA terminus occulusion a/p L ICA thrombectomy, no stent, 5 passes TICI 2C  w/ underling L petrous ICA dissection on 11/3 w/ Dr. March. Post op course c/b slight pseudoaneurysm enlargement w/ dissection distal embolisim into the terminal ICA seen on 11/4 CTA and hemorrhagic transformation of L MCA infarct.   Of note, hospital course also significant for fevers, suspect aspiration pneumonitis treated with Rocephin x 1, and dysphagia requiring easy to chew,  with mod thick liquids. EP was consulted with concerns of cardioembolic source for infarct, recommended to f/u outpatient for Zio patch.   Patient had an MBS on 11/12 which showed moderate oropharyngeal dysphagia, remarkable for delayed/reduced laryngeal vestibular closure, pharyngeal trigger delay and reduced hyolaryngeal elevation/excursion.  There was deep laryngeal penetration and possibly aspiration of thin liquid.  Patient was cleared for easy to chew GI diet with mildly thick liquids.    Patient optimized and was evaluated by PM&R and therapy for functional deficits, gait/ADL impairments and acute rehabilitation was recommended. Patient was cleared for discharge to [facility] IRU on 11/13/24.  (13 Nov 2024 15:44)      SUBJECTIVE: Patient seen and examined. No acute overnight events, slept well. Awake and alert. Aphasic     REVIEW OF SYSTEMS  aphasic  urinary retention    VITALS  69y  Vital Signs Last 24 Hrs  T(C): 36.7 (29 Nov 2024 07:39), Max: 36.7 (28 Nov 2024 21:45)  T(F): 98 (29 Nov 2024 07:39), Max: 98.1 (28 Nov 2024 21:45)  HR: 50 (29 Nov 2024 07:39) (50 - 57)  BP: 125/79 (29 Nov 2024 07:39) (118/62 - 125/79)  BP(mean): --  RR: 16 (29 Nov 2024 07:39) (16 - 16)  SpO2: 96% (29 Nov 2024 07:39) (95% - 96%)    Parameters below as of 29 Nov 2024 09:31  Patient On (Oxygen Delivery Method): room air        PHYSICAL EXAM  Constitutional - NAD, Comfortable   HEENT - NCAT, EOMI   Pulm- breathing comfortably   Cardio - warm and well perfused  Abdomen -  Soft, NTND   : Brito in place, bag with clear yellow urine  Extremities -  No calf tenderness    Neurologic Exam:                     Cognitive -              Orientation: Awake and alert      Speech: Nonfluent aphasia, able to make sounds, facial expressions in response to questions     Cranial Nerves + left lip droop, +left facial weakness, +left shoulder shrug weak, +dysphagia     Motor -                      LEFT    UE - ShAB 5/5, EF 5/5, EE 5/5, WE 5/5,  5/5                     RIGHT UE - ShAB 0/5, EF 0/5, EE 0/5, WE 0/5,  0/5                     LEFT    LE - HF 5/5, KE 5/5, DF 5/5, PF 5/5                     RIGHT LE - HF 0/5, KE 0/5, DF 0/5, PF 0/5       Tone in right pec, biceps MAS 1, also in right hamstrings and tight achilles   Psychiatric - Mood stable      RECENT LABS:                        10.8   5.13  )-----------( 190      ( 29 Nov 2024 05:31 )             32.8     11-29    145  |  108  |  15  ----------------------------<  102[H]  3.6   |  29  |  1.02    Ca    8.8      29 Nov 2024 05:31    TPro  5.7[L]  /  Alb  2.5[L]  /  TBili  0.4  /  DBili  x   /  AST  26  /  ALT  31  /  AlkPhos  62  11-29    LIVER FUNCTIONS - ( 29 Nov 2024 05:31 )  Alb: 2.5 g/dL / Pro: 5.7 g/dL / ALK PHOS: 62 U/L / ALT: 31 U/L / AST: 26 U/L / GGT: x             Urinalysis Basic - ( 29 Nov 2024 05:31 )    Color: x / Appearance: x / SG: x / pH: x  Gluc: 102 mg/dL / Ketone: x  / Bili: x / Urobili: x   Blood: x / Protein: x / Nitrite: x   Leuk Esterase: x / RBC: x / WBC x   Sq Epi: x / Non Sq Epi: x / Bacteria: x          CAPILLARY BLOOD GLUCOSE            MEDICATIONS:  MEDICATIONS  (STANDING):  albuterol    90 MICROgram(s) HFA Inhaler 2 Puff(s) Inhalation every 6 hours  aspirin 325 milliGRAM(s) Oral daily  cholecalciferol 1000 Unit(s) Oral daily  clopidogrel Tablet 75 milliGRAM(s) Oral daily  colchicine 0.6 milliGRAM(s) Oral daily  donepezil 5 milliGRAM(s) Oral with breakfast  enoxaparin Injectable 40 milliGRAM(s) SubCutaneous every 24 hours  melatonin 9 milliGRAM(s) Oral at bedtime  pantoprazole    Tablet 40 milliGRAM(s) Oral before breakfast  PARoxetine 30 milliGRAM(s) Oral daily  rosuvastatin 5 milliGRAM(s) Oral daily  senna 2 Tablet(s) Oral at bedtime  tamsulosin 0.4 milliGRAM(s) Oral at bedtime    MEDICATIONS  (PRN):  acetaminophen     Tablet .. 650 milliGRAM(s) Oral every 6 hours PRN Mild Pain (1 - 3)  atropine 1% Ophthalmic Solution for SubLingual Use 1 Drop(s) SubLingual every 8 hours PRN cough, and increased upper airway secretions  bisacodyl Suppository 10 milliGRAM(s) Rectal daily PRN Constipation  simethicone 80 milliGRAM(s) Chew four times a day PRN Dyspepsia     Patient is a 69y old  Male who presents with a chief complaint of L MCA infarct w/ hemorrhagic conversion (29 Nov 2024 08:29)      HPI:  68 y/o M w/ PMHx HLD, depression, GERD, gout, colonic polyps,  congenital absence of one kidney, recent cholecystitis s/p cholecystectomy (6/24), RHETT on CPAP orginially presented to [facility] on 11/3 with aphasia, R sided weakness and R facial droop and vigorous cough x 2 weeks s/p COVID vaccine. LKW 2045 on 11/2. 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 Patient transferred to [facility] for mechanical thrombectomy. S/p L ICA terminus occulusion a/p L ICA thrombectomy, no stent, 5 passes TICI 2C  w/ underling L petrous ICA dissection on 11/3 w/ Dr. March. Post op course c/b slight pseudoaneurysm enlargement w/ dissection distal embolisim into the terminal ICA seen on 11/4 CTA and hemorrhagic transformation of L MCA infarct.   Of note, hospital course also significant for fevers, suspect aspiration pneumonitis treated with Rocephin x 1, and dysphagia requiring easy to chew,  with mod thick liquids. EP was consulted with concerns of cardioembolic source for infarct, recommended to f/u outpatient for Zio patch.   Patient had an MBS on 11/12 which showed moderate oropharyngeal dysphagia, remarkable for delayed/reduced laryngeal vestibular closure, pharyngeal trigger delay and reduced hyolaryngeal elevation/excursion.  There was deep laryngeal penetration and possibly aspiration of thin liquid.  Patient was cleared for easy to chew GI diet with mildly thick liquids.    Patient optimized and was evaluated by PM&R and therapy for functional deficits, gait/ADL impairments and acute rehabilitation was recommended. Patient was cleared for discharge to [facility] IRU on 11/13/24.  (13 Nov 2024 15:44)      SUBJECTIVE: Patient seen and examined. No acute overnight events, slept well. Awake and alert. Aphasic     REVIEW OF SYSTEMS  aphasic  urinary retention    VITALS  69y  Vital Signs Last 24 Hrs  T(C): 36.7 (29 Nov 2024 07:39), Max: 36.7 (28 Nov 2024 21:45)  T(F): 98 (29 Nov 2024 07:39), Max: 98.1 (28 Nov 2024 21:45)  HR: 50 (29 Nov 2024 07:39) (50 - 57)  BP: 125/79 (29 Nov 2024 07:39) (118/62 - 125/79)  BP(mean): --  RR: 16 (29 Nov 2024 07:39) (16 - 16)  SpO2: 96% (29 Nov 2024 07:39) (95% - 96%)    Parameters below as of 29 Nov 2024 09:31  Patient On (Oxygen Delivery Method): room air        PHYSICAL EXAM  Constitutional - NAD, Comfortable   HEENT - NCAT, EOMI   Pulm- breathing comfortably   Cardio - warm and well perfused  Abdomen -  Soft, NTND   : Brito in place, bag with clear yellow urine  Extremities -  No calf tenderness    Neurologic Exam:                     Cognitive -              Orientation: Awake and alert      Speech: Nonfluent aphasia, +dysarthria, able to make sounds, facial expressions in response to questions     Cranial Nerves + left lip droop, +left facial weakness, +left shoulder shrug weak,     Motor -                      LEFT    UE - ShAB 5/5, EF 5/5, EE 5/5, WE 5/5,  5/5                     RIGHT UE - ShAB 0/5, EF 0/5, EE 0/5, WE 0/5,  0/5                     LEFT    LE - HF 5/5, KE 5/5, DF 5/5, PF 5/5                     RIGHT LE - HF 0/5, KE 0/5, DF 0/5, PF 0/5       Tone in right pec, biceps MAS 1, also in right hamstrings and tight achilles   Psychiatric - Mood stable      RECENT LABS:                        10.8   5.13  )-----------( 190      ( 29 Nov 2024 05:31 )             32.8     11-29    145  |  108  |  15  ----------------------------<  102[H]  3.6   |  29  |  1.02    Ca    8.8      29 Nov 2024 05:31    TPro  5.7[L]  /  Alb  2.5[L]  /  TBili  0.4  /  DBili  x   /  AST  26  /  ALT  31  /  AlkPhos  62  11-29    LIVER FUNCTIONS - ( 29 Nov 2024 05:31 )  Alb: 2.5 g/dL / Pro: 5.7 g/dL / ALK PHOS: 62 U/L / ALT: 31 U/L / AST: 26 U/L / GGT: x             Urinalysis Basic - ( 29 Nov 2024 05:31 )    Color: x / Appearance: x / SG: x / pH: x  Gluc: 102 mg/dL / Ketone: x  / Bili: x / Urobili: x   Blood: x / Protein: x / Nitrite: x   Leuk Esterase: x / RBC: x / WBC x   Sq Epi: x / Non Sq Epi: x / Bacteria: x          CAPILLARY BLOOD GLUCOSE            MEDICATIONS:  MEDICATIONS  (STANDING):  albuterol    90 MICROgram(s) HFA Inhaler 2 Puff(s) Inhalation every 6 hours  aspirin 325 milliGRAM(s) Oral daily  cholecalciferol 1000 Unit(s) Oral daily  clopidogrel Tablet 75 milliGRAM(s) Oral daily  colchicine 0.6 milliGRAM(s) Oral daily  donepezil 5 milliGRAM(s) Oral with breakfast  enoxaparin Injectable 40 milliGRAM(s) SubCutaneous every 24 hours  melatonin 9 milliGRAM(s) Oral at bedtime  pantoprazole    Tablet 40 milliGRAM(s) Oral before breakfast  PARoxetine 30 milliGRAM(s) Oral daily  rosuvastatin 5 milliGRAM(s) Oral daily  senna 2 Tablet(s) Oral at bedtime  tamsulosin 0.4 milliGRAM(s) Oral at bedtime    MEDICATIONS  (PRN):  acetaminophen     Tablet .. 650 milliGRAM(s) Oral every 6 hours PRN Mild Pain (1 - 3)  atropine 1% Ophthalmic Solution for SubLingual Use 1 Drop(s) SubLingual every 8 hours PRN cough, and increased upper airway secretions  bisacodyl Suppository 10 milliGRAM(s) Rectal daily PRN Constipation  simethicone 80 milliGRAM(s) Chew four times a day PRN Dyspepsia

## 2024-11-29 NOTE — PROGRESS NOTE ADULT - SUBJECTIVE AND OBJECTIVE BOX
Patient is a 69y old  Male who presents with a chief complaint of L MCA infarct w/ hemorrhagic conversion (28 Nov 2024 13:43)      Patient examined and interviewed. There were no acute medical events yesterday or overnight and patient is without complaints this morning.  Sitting up at nurses station this AM answering yes no questions states slept well no further abdominal pain   REVIEW OF SYMPTOMS: patient denies HA's, CP, palpitations, shortness of breath or upper respiratory symptoms, nausea, vomiting, diarrhea, constipation, dysuria, bruising/bleeding and all other systems were reviewed as negative      ALLERGIES:  penicillin (Swelling)    MEDICATIONS  (STANDING):  albuterol    90 MICROgram(s) HFA Inhaler 2 Puff(s) Inhalation every 6 hours  aspirin 325 milliGRAM(s) Oral daily  cholecalciferol 1000 Unit(s) Oral daily  clopidogrel Tablet 75 milliGRAM(s) Oral daily  colchicine 0.6 milliGRAM(s) Oral daily  donepezil 5 milliGRAM(s) Oral with breakfast  enoxaparin Injectable 40 milliGRAM(s) SubCutaneous every 24 hours  melatonin 9 milliGRAM(s) Oral at bedtime  pantoprazole    Tablet 40 milliGRAM(s) Oral before breakfast  PARoxetine 30 milliGRAM(s) Oral daily  polyethylene glycol 3350 17 Gram(s) Oral daily  rosuvastatin 5 milliGRAM(s) Oral daily  senna 2 Tablet(s) Oral at bedtime  tamsulosin 0.4 milliGRAM(s) Oral at bedtime    MEDICATIONS  (PRN):  acetaminophen     Tablet .. 650 milliGRAM(s) Oral every 6 hours PRN Mild Pain (1 - 3)  atropine 1% Ophthalmic Solution for SubLingual Use 1 Drop(s) SubLingual every 8 hours PRN cough, and increased upper airway secretions  bisacodyl Suppository 10 milliGRAM(s) Rectal daily PRN Constipation  simethicone 80 milliGRAM(s) Chew four times a day PRN Dyspepsia    Vital Signs Last 24 Hrs  T(F): 98 (29 Nov 2024 07:39), Max: 98.1 (28 Nov 2024 21:45)  HR: 50 (29 Nov 2024 07:39) (50 - 57)  BP: 125/79 (29 Nov 2024 07:39) (118/62 - 125/79)  RR: 16 (29 Nov 2024 07:39) (16 - 16)  SpO2: 96% (29 Nov 2024 07:39) (95% - 96%)  I&O's Summary    28 Nov 2024 07:01  -  29 Nov 2024 07:00  --------------------------------------------------------  IN: 480 mL / OUT: 1300 mL / NET: -820 mL    29 Nov 2024 07:01  -  29 Nov 2024 08:30  --------------------------------------------------------  IN: 0 mL / OUT: 300 mL / NET: -300 mL                PHYSICAL EXAM:  General: NAD, Alert and responsive  ENT: MMM  Neck: Supple, No JVD  Lungs: Clear to auscultation bilaterally  Cardio: RRR, S1/S2, No murmurs  Abdomen: Soft, Nontender, Nondistended; Bowel sounds present  Extremities: No calf tenderness, No pitting edema  Neuro : dysarthria expressive aphasia rt HP no new deficits      LABS:                        10.8   5.13  )-----------( 190      ( 29 Nov 2024 05:31 )             32.8                                         COVID-19 PCR: NotDetec (11-13-24 @ 22:33)

## 2024-11-29 NOTE — DISCHARGE NOTE PROVIDER - HOSPITAL COURSE
HPI:  70 y/o M w/ PMHx HLD, depression, GERD, gout, colonic polyps,  congenital absence of one kidney, recent cholecystitis s/p cholecystectomy (6/24), RHETT on CPAP orginially presented to [facility] on 11/3 with aphasia, R sided weakness and R facial droop and vigorous cough x 2 weeks s/p COVID vaccine. LKW 2045 on 11/2. 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 [place] [facility] [place]. S/p L ICA terminus occulusion a/p L ICA thrombectomy, no stent, 5 passes TICI 2C  w/ underling L petrous ICA dissection on 11/3 w/ Dr. March. Post op course c/b slight pseudoaneurysm enlargement w/ dissection distal embolisim into the terminal ICA seen on 11/4 CTA and hemorrhagic transformation of L MCA infarct.   Of note, hospital course also significant for fevers, suspect aspiration pneumonitis treated with Rocephin x 1, and dysphagia requiring easy to chew,  with mod thick liquids. EP was consulted with concerns of cardioembolic source for infarct, recommended to f/u outpatient for Zio patch.   Patient had an MBS on 11/12 which showed moderate oropharyngeal dysphagia, remarkable for delayed/reduced laryngeal vestibular closure, pharyngeal trigger delay and reduced hyolaryngeal elevation/excursion.  There was deep laryngeal penetration and possibly aspiration of thin liquid.  Patient was cleared for easy to chew GI diet with mildly thick liquids.    Patient optimized and was evaluated by PM&R and therapy for functional deficits, gait/ADL impairments and acute rehabilitation was recommended. Patient was cleared for discharge to [facility] IRU on 11/13/24.      Patient participated in daily therapies and made good functional gains.      Rehab course notable for        Patient tolerated course of inpatient PT/OT/SLP rehab with significant functional improvements. Patient seen and examined on day of discharge.  Medications, medication side effects, and discharge instructions were reviewed with the patient, who expressed understanding of all information.  Patient was medically and functionally optimized and cleared for discharge. HPI:  68 y/o M w/ PMHx HLD, depression, GERD, gout, colonic polyps,  congenital absence of one kidney, recent cholecystitis s/p cholecystectomy (6/24), RHETT on CPAP orginially presented to [facility] on 11/3 with aphasia, R sided weakness and R facial droop and vigorous cough x 2 weeks s/p COVID vaccine. LKW 2045 on 11/2. 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 [place] [facility] [place]. S/p L ICA terminus occulusion a/p L ICA thrombectomy, no stent, 5 passes TICI 2C  w/ underling L petrous ICA dissection on 11/3 w/ Dr. March. Post op course c/b slight pseudoaneurysm enlargement w/ dissection distal embolisim into the terminal ICA seen on 11/4 CTA and hemorrhagic transformation of L MCA infarct.   Of note, hospital course also significant for fevers, suspect aspiration pneumonitis treated with Rocephin x 1, and dysphagia requiring easy to chew,  with mod thick liquids. EP was consulted with concerns of cardioembolic source for infarct, recommended to f/u outpatient for Zio patch.   Patient had an MBS on 11/12 which showed moderate oropharyngeal dysphagia, remarkable for delayed/reduced laryngeal vestibular closure, pharyngeal trigger delay and reduced hyolaryngeal elevation/excursion.  There was deep laryngeal penetration and possibly aspiration of thin liquid.  Patient was cleared for easy to chew GI diet with mildly thick liquids.    Patient optimized and was evaluated by PM&R and therapy for functional deficits, gait/ADL impairments and acute rehabilitation was recommended. Patient was cleared for discharge to [facility] IRU on 11/13/24.      Patient participated in daily therapies and made good functional gains.      Rehab course notable for aphasia and dysphagia. He was started on Aricept on 11/26.     orthostatic hypotension.   Tone, started on tizanidine.   DVT of Right soleal vein on 11/14.           Patient tolerated course of inpatient PT/OT/SLP rehab with significant functional improvements. Patient seen and examined on day of discharge.  Medications, medication side effects, and discharge instructions were reviewed with the patient, who expressed understanding of all information.  Patient was medically and functionally optimized and cleared for discharge. HPI:  68 y/o M w/ PMHx HLD, depression, GERD, gout, colonic polyps,  congenital absence of one kidney, recent cholecystitis s/p cholecystectomy (6/24), RHETT on CPAP orginially presented to [facility] on 11/3 with aphasia, R sided weakness and R facial droop and vigorous cough x 2 weeks s/p COVID vaccine. LKW 2045 on 11/2. 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 Patient transferred to [facility] [place] for mechanical thrombectomy. S/p L ICA terminus occulusion a/p L ICA thrombectomy, no stent, 5 passes TICI 2C  w/ underling L petrous ICA dissection on 11/3 w/ Dr. March. Post op course c/b slight pseudoaneurysm enlargement w/ dissection distal embolisim into the terminal ICA seen on 11/4 CTA and hemorrhagic transformation of L MCA infarct.   Of note, hospital course also significant for fevers, suspect aspiration pneumonitis treated with Rocephin x 1, and dysphagia requiring easy to chew,  with mod thick liquids. EP was consulted with concerns of cardioembolic source for infarct, recommended to f/u outpatient for Zio patch.   Patient had an MBS on 11/12 which showed moderate oropharyngeal dysphagia, remarkable for delayed/reduced laryngeal vestibular closure, pharyngeal trigger delay and reduced hyolaryngeal elevation/excursion.  There was deep laryngeal penetration and possibly aspiration of thin liquid.  Patient was cleared for easy to chew GI diet with mildly thick liquids.    Patient optimized and was evaluated by PM&R and therapy for functional deficits, gait/ADL impairments and acute rehabilitation was recommended. Patient was cleared for discharge to [facility] IRU on 11/13/24.      Patient participated in daily therapies and made good functional gains.      Rehab course notable for aphasia and dysphagia. He was started on Aricept on 11/26. Patient diet was upgraded to regular with thins. Patient experienced orthostatic hypotension, so his blood pressure medications norvasc and losartan was discontinued. He was found to have a DVT of the right soleal vein on 11/14 that has been stable with weekly serial dopplers. Most recent duplex on 11/29. Patient was also started on tizanidine at [facility] for tone in the RLE. He is wearing a prafo boot in bed. Patient completed Macrobid for enteroccoccus UTI. Patient has RHETT and refused to wear CPAP.  He had on PRN O2 NC... Pulm following.....     Patient had failed 1 TOV. Brito placed. Now he is undergoing another TOV on 11/29......    Patient tolerated course of inpatient PT/OT/SLP rehab with significant functional improvements. Patient seen and examined on day of discharge.  Medications, medication side effects, and discharge instructions were reviewed with the patient, who expressed understanding of all information.  Patient was medically and functionally optimized and cleared for discharge.      Patient needs to be follow up   -cardiology  -neurology   -PM&R   HPI:  70 y/o M w/ PMHx HLD, depression, GERD, gout, colonic polyps,  congenital absence of one kidney, recent cholecystitis s/p cholecystectomy (6/24), RHETT on CPAP orginially presented to [facility] on 11/3 with aphasia, R sided weakness and R facial droop and vigorous cough x 2 weeks s/p COVID vaccine. LKW 2045 on 11/2. 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 Patient [place] [facility] [place]. S/p L ICA terminus occulusion a/p L ICA thrombectomy, no stent, 5 passes TICI 2C  w/ underling L petrous ICA dissection on 11/3 w/ Dr. March. Post op course c/b slight pseudoaneurysm enlargement w/ dissection distal embolisim into the terminal ICA seen on 11/4 CTA and hemorrhagic transformation of L MCA infarct.   Of note, hospital course also significant for fevers, suspect aspiration pneumonitis treated with Rocephin x 1, and dysphagia requiring easy to chew,  with mod thick liquids. EP was consulted with concerns of cardioembolic source for infarct, recommended to f/u outpatient for Zio patch.   Patient had an MBS on 11/12 which showed moderate oropharyngeal dysphagia, remarkable for delayed/reduced laryngeal vestibular closure, pharyngeal trigger delay and reduced hyolaryngeal elevation/excursion.  There was deep laryngeal penetration and possibly aspiration of thin liquid.  Patient was cleared for easy to chew GI diet with mildly thick liquids.    Patient optimized and was evaluated by PM&R and therapy for functional deficits, gait/ADL impairments and acute rehabilitation was recommended. Patient was cleared for discharge to [facility] IRU on 11/13/24.      Patient participated in daily therapies and made good functional gains.      Rehab course notable for aphasia and dysphagia. He was started on Aricept on 11/26. Patient diet was upgraded to regular with thins. Patient experienced orthostatic hypotension, so his blood pressure medications norvasc and losartan was discontinued. He was found to have a DVT of the right soleal vein on 11/14 that has been stable with weekly serial dopplers. Most recent duplex on 11/29. Patient was also started on tizanidine at [facility] for tone in the RLE. He is wearing a prafo boot in bed. Patient completed Macrobid for enteroccoccus UTI. Patient has RHETT and refused to wear CPAP.  Pulm consulted and recommended NC O2 if not wearing CPAP.     Patient had failed 1 TOV. Brito placed. Now he is undergoing another TOV on 11/29 which patient has been successful with.     Patient tolerated course of inpatient PT/OT/SLP rehab with significant functional improvements. Patient seen and examined on day of discharge.  Medications, medication side effects, and discharge instructions were reviewed with the patient, who expressed understanding of all information.  Patient was medically and functionally optimized and cleared for discharge. Patient discharged to [facility] on 12/5. HPI:  70 y/o M w/ PMHx HLD, depression, GERD, gout, colonic polyps,  congenital absence of one kidney, recent cholecystitis s/p cholecystectomy (6/24), RHETT on CPAP orginially presented to [facility] on 11/3 with aphasia, R sided weakness and R facial droop and vigorous cough x 2 weeks s/p COVID vaccine. LKW 2045 on 11/2. 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 Patient transferred to [facility] [place] mechanical thrombectomy. S/p L ICA terminus occulusion a/p L ICA thrombectomy, no stent, 5 passes TICI 2C  w/ underling L petrous ICA dissection on 11/3 w/ Dr. March. Post op course c/b slight pseudoaneurysm enlargement w/ dissection distal embolisim into the terminal ICA seen on 11/4 CTA and hemorrhagic transformation of L MCA infarct.   Of note, hospital course also significant for fevers, suspect aspiration pneumonitis treated with Rocephin x 1, and dysphagia requiring easy to chew,  with mod thick liquids. EP was consulted with concerns of cardioembolic source for infarct, recommended to f/u outpatient for Zio patch.   Patient had an MBS on 11/12 which showed moderate oropharyngeal dysphagia, remarkable for delayed/reduced laryngeal vestibular closure, pharyngeal trigger delay and reduced hyolaryngeal elevation/excursion.  There was deep laryngeal penetration and possibly aspiration of thin liquid.  Patient was cleared for easy to chew GI diet with mildly thick liquids.    Patient optimized and was evaluated by PM&R and therapy for functional deficits, gait/ADL impairments and acute rehabilitation was recommended. Patient was cleared for discharge to [facility] IRU on 11/13/24.      Patient participated in daily therapies and made good functional gains.      Rehab course notable for aphasia, severe apraxia, and dysphagia. He was started on Aricept on 11/26. Patient diet was upgraded to regular with thins. Patient experienced orthostatic hypotension, so his blood pressure medications norvasc and losartan was discontinued. He was found to have a DVT of the right soleal vein on 11/14 that has been stable with weekly serial dopplers. Most recent duplex on 11/29. Patient was also started on tizanidine at [facility] for tone in the RLE. He is wearing a prafo boot in bed. Patient completed Macrobid for enteroccoccus UTI. Patient has RHETT and refused to wear CPAP.  Pulm consulted and recommended NC O2 if not wearing CPAP.     Patient had failed 1 TOV. Brito placed. Now he is undergoing another TOV on 11/29 which patient has been successful with.     Patient tolerated course of inpatient PT/OT/SLP rehab with significant functional improvements. Patient seen and examined on day of discharge.  Medications, medication side effects, and discharge instructions were reviewed with the patient, who expressed understanding of all information.  Patient was medically and functionally optimized and cleared for discharge. Patient discharged to [facility] on 12/5. HPI:  70 y/o M w/ PMHx HLD, depression, GERD, gout, colonic polyps,  congenital absence of one kidney, recent cholecystitis s/p cholecystectomy (6/24), RHETT on CPAP orginially presented to [facility] on 11/3 with aphasia, R sided weakness and R facial droop and vigorous cough x 2 weeks s/p COVID vaccine. LKW 2045 on 11/2. 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 Patient transferred to [facility] for mechanical thrombectomy. S/p L ICA terminus occulusion a/p L ICA thrombectomy, no stent, 5 passes TICI 2C  w/ underling L petrous ICA dissection on 11/3 w/ Dr. March. Post op course c/b slight pseudoaneurysm enlargement w/ dissection distal embolisim into the terminal ICA seen on 11/4 CTA and hemorrhagic transformation of L MCA infarct.   Of note, hospital course also significant for fevers, suspect aspiration pneumonitis treated with Rocephin x 1, and dysphagia requiring easy to chew,  with mod thick liquids. EP was consulted with concerns of cardioembolic source for infarct, recommended to f/u outpatient for Zio patch.   Patient had an MBS on 11/12 which showed moderate oropharyngeal dysphagia, remarkable for delayed/reduced laryngeal vestibular closure, pharyngeal trigger delay and reduced hyolaryngeal elevation/excursion.  There was deep laryngeal penetration and possibly aspiration of thin liquid.  Patient was cleared for easy to chew GI diet with mildly thick liquids.    Patient optimized and was evaluated by PM&R and therapy for functional deficits, gait/ADL impairments and acute rehabilitation was recommended. Patient was cleared for discharge to [facility] IRU on 11/13/24.      Patient participated in daily therapies and made good functional gains.      Rehab course notable for aphasia, severe apraxia, and dysphagia. He was started on Aricept on 11/26. Patient diet was upgraded to regular with thins. Patient experienced orthostatic hypotension, so his blood pressure medications norvasc and losartan was discontinued. He was found to have a DVT of the right soleal vein on 11/14 that has been stable with weekly serial dopplers. Most recent duplex on 11/29. Patient was also started on tizanidine at [facility] for tone in the RLE. He is wearing a prafo boot in bed. Patient completed Macrobid for enteroccoccus UTI. Patient has RHETT and refused to wear CPAP.  Pulm consulted and recommended NC O2 if not wearing CPAP.     Patient had failed 1 TOV. Brito placed. Now he is undergoing another TOV on 11/29 which patient has been successful with.     Patient tolerated course of inpatient PT/OT/SLP rehab with significant functional improvements. Patient seen and examined on day of discharge.  Medications, medication side effects, and discharge instructions were reviewed with the patient, who expressed understanding of all information.  Patient was medically and functionally optimized and cleared for discharge. Patient discharged to [facility] on 12/5.      Discharge Function:  OT: Eating supervision, grooming supervision, UE dressing Minimal assist, LE dressing Moderate assist in bed, toileting maximal assist, bathing moderate assist seated in shower, toilet transfer maximal assist with drop arm commode and left grab bar, shower transfer total assist (using rolling shower chair).    PT: w/c to/from bed with Tahmina Stedy with mod a x 2 reps  w/c to bed with max assist squat popover  sit to/from supine mod a  lateral scooting along mat with mod assist 8 feet x 2 reps to the right, 1 rep to the left  left rolling with mod assist, right rolling with min assist  sit to/from stand from mat to hemicane with max assist x 3 reps    Neuromuscular Re-education    -static standing from mat with hemicane x 3 reps with mod-max assist of 1, min assist of another to maintain erect posture, symmetrical weight bearing and to prevent right knee buckling. Pt used right UE sling and mirror for visual feedback. Pt able to hold statically for 2 minutes    --able to tolerate static standing for 2 minutes in good postural alignment. Pt transported back to unit in tilted w/c in NAD +seatbelt and posey alarm, direct supervision from staff.    Speech:  regular diet w/ Thin liquids, apraxia mod-severe but continues to imrpove, attempts to verbalize needs more

## 2024-11-29 NOTE — DISCHARGE NOTE PROVIDER - NSDCMRMEDTOKEN_GEN_ALL_CORE_FT
acetaminophen 325 mg oral tablet: 2 tab(s) orally every 6 hours As needed Mild Pain (1 - 3)  albuterol 90 mcg/inh inhalation aerosol: 2 puff(s) inhaled every 6 hours  aspirin 325 mg oral tablet: 1 tab(s) orally once a day  atropine 1% ophthalmic solution: 1 application to each affected eye 4 times a day as needed for  dry eyes  bisacodyl 10 mg rectal suppository: 1 suppository(ies) rectal once a day As needed Constipation  cholecalciferol oral tablet: 1000 unit(s) orally once a day  clopidogrel 75 mg oral tablet: 1 tab(s) orally once a day  colchicine 0.6 mg oral tablet: 1 tab(s) orally once a day  donepezil 5 mg oral tablet: 1 tab(s) orally once a day (before a meal)  enoxaparin: 40 milligram(s) subcutaneous once a day dvt ppx  melatonin 3 mg oral tablet: 3 tab(s) orally once a day (at bedtime)  pantoprazole 40 mg oral delayed release tablet: 1 tab(s) orally once a day (before a meal)  PARoxetine 30 mg oral tablet: 1 tab(s) orally once a day  polyethylene glycol 3350 oral powder for reconstitution: 17 gram(s) orally once a day As needed Constipation  rosuvastatin 5 mg oral tablet: 1 tab(s) orally once a day  senna leaf extract oral tablet: 2 tab(s) orally once a day (at bedtime) As needed Constipation  simethicone 80 mg oral tablet, chewable: 1 tab(s) orally 4 times a day As needed Dyspepsia  tamsulosin 0.4 mg oral capsule: 1 cap(s) orally once a day (at bedtime)   acetaminophen 325 mg oral tablet: 2 tab(s) orally every 6 hours As needed Mild Pain (1 - 3)  albuterol 90 mcg/inh inhalation aerosol: 2 puff(s) inhaled every 6 hours As needed Bronchospasm  aspirin 325 mg oral tablet: 1 tab(s) orally once a day  atropine 1% ophthalmic solution: 1 application to each affected eye 4 times a day as needed for  dry eyes  bisacodyl 10 mg rectal suppository: 1 suppository(ies) rectal once a day As needed Constipation  cholecalciferol oral tablet: 1000 unit(s) orally once a day  clopidogrel 75 mg oral tablet: 1 tab(s) orally once a day  colchicine 0.6 mg oral tablet: 1 tab(s) orally once a day  donepezil 5 mg oral tablet: 1 tab(s) orally once a day (before a meal)  enoxaparin: 40 milligram(s) subcutaneous once a day dvt ppx  melatonin 3 mg oral tablet: 3 tab(s) orally once a day (at bedtime)  pantoprazole 40 mg oral delayed release tablet: 1 tab(s) orally once a day (before a meal)  PARoxetine 30 mg oral tablet: 1 tab(s) orally once a day  polyethylene glycol 3350 oral powder for reconstitution: 17 gram(s) orally once a day As needed Constipation  rosuvastatin 5 mg oral tablet: 1 tab(s) orally once a day  senna leaf extract oral tablet: 2 tab(s) orally once a day (at bedtime) As needed Constipation  simethicone 80 mg oral tablet, chewable: 1 tab(s) orally 4 times a day As needed Dyspepsia  tamsulosin 0.4 mg oral capsule: 1 cap(s) orally once a day (at bedtime)  tiZANidine 2 mg oral tablet: 1 tab(s) orally once a day (at bedtime)   acetaminophen 325 mg oral tablet: 2 tab(s) orally every 6 hours As needed Mild Pain (1 - 3)  albuterol 90 mcg/inh inhalation aerosol: 2 puff(s) inhaled every 6 hours As needed Bronchospasm  aluminum hydroxide-magnesium hydroxide 200 mg-200 mg/5 mL oral suspension: 30 milliliter(s) orally every 6 hours As needed Dyspepsia  aspirin 325 mg oral tablet: 1 tab(s) orally once a day  atropine 1% ophthalmic solution: 1 application to each affected eye 4 times a day as needed for  dry eyes  bisacodyl 10 mg rectal suppository: 1 suppository(ies) rectal once a day As needed Constipation  cholecalciferol oral tablet: 1000 unit(s) orally once a day  clopidogrel 75 mg oral tablet: 1 tab(s) orally once a day  colchicine 0.6 mg oral tablet: 1 tab(s) orally once a day  donepezil 5 mg oral tablet: 1 tab(s) orally once a day (before a meal)  enoxaparin: 40 milligram(s) subcutaneous once a day dvt ppx  melatonin 3 mg oral tablet: 3 tab(s) orally once a day (at bedtime)  pantoprazole 40 mg oral delayed release tablet: 1 tab(s) orally once a day (before a meal)  PARoxetine 30 mg oral tablet: 1 tab(s) orally once a day  polyethylene glycol 3350 oral powder for reconstitution: 17 gram(s) orally once a day As needed Constipation  rosuvastatin 5 mg oral tablet: 1 tab(s) orally once a day  senna leaf extract oral tablet: 2 tab(s) orally once a day (at bedtime) As needed Constipation  simethicone 80 mg oral tablet, chewable: 1 tab(s) orally 4 times a day As needed Dyspepsia  tamsulosin 0.4 mg oral capsule: 1 cap(s) orally once a day (at bedtime)  tiZANidine 2 mg oral tablet: 1 tab(s) orally once a day (at bedtime)

## 2024-11-29 NOTE — PROGRESS NOTE ADULT - ASSESSMENT
TARUN CRESPO is a 70 y/o M w/ PMHx HLD, depression, GERD, gout, colonic polyps,  congenital absence of one kidney, recent cholecystitis s/p cholecystectomy (6/24), RHETT on CPAP orginially presented to [facility] on 11/3 with aphasia, R sided weakness and R facial droop and vigorous cough x 2 weeks s/p COVID vaccine. 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  S/p L ICA terminus occulusion a/p L ICA thrombectomy, no stent, 5 passes TICI 2C  w/ underling L petrous ICA dissection on 11/3 w/ Dr. March. Now admitted to [facility] for functional deficits for initiation of a multidisciplinary rehab program     #L MCA stroke w/ hemorrhagic conversion  #L petrous ICA dissection w/ pesudoaneurysm  -CTH 11/3: hyperdense appearance of L basal ganglia and L frontal lobe gyri, mild LCA edema 2/2 recent ischemia  -CTA H/N 11/3: no evidence of significant stenosis affecting the extra cranial carotid or vertebral arteries. No evidence of aneurysm or significant vascular stenosis at [place]  -Repeat CTH 11/4: post recent endovascular revascularization of the left internal carotid artery and left middle cerebral artery. Minimally less conspicuous hyperdense appearance of left basal ganglia and left frontal lobe gyri which may represent expected evolution of contrast staining  -MRI brain 11/5: evolving recent infarct in L ICA associated with hemorrhagic transformation of the L basal ganglia. Effacement of cerebral sulci with mass effect causing 0.6 mm rightward shift.   -CTH 11/9: evolving acute to subacute left MCA infarct with evolving left basal ganglia hemorrhagic transformation, new punctuate foci of increased hyperdensity. Stable mass effect and 5mm rightward midline shift  -CTA 11/9: stable left internal carotid artery pseudoaneurysm immediately inferior to the petrous portion of the L ICA at the area of focal dissection.  -S/p L ICA terminus occulusion a/p L ICA thrombectomy, no stent, 5 passes TICI 2C  w/ underling L petrous ICA dissection on 11/3 w/ Dr. March.   -A1c 5.0; LDL 60  -Aspirin 325mg daily  -Plavix 75mg daily   rosuvastatin 5mg QD  -Will be on DAPT for 3 months and follow up with outpt neurology  -F/u outpatient cardiology for Zio-patch to r/o cardioembolic source --ILR to be considered as outpatient    #Urinary Retention, repeated PVR > 500cc,   - jerome inserted  - continue flomax  - treat constipation aggressively   - Macrobid since 11/20 for UTI ( E. Faecalis ), afebrile, no leukocytosis on labs. Completed 7 day course  - plan for voiding trial in the next few days    #Elevated PSA  - pt will need o/p F/U with Urology      # Normocytic anemia (stable)   -Monitor H/H (12.2/37.2- on 11/14), most recent Hb 11.6  -F/u outpatient colonoscopy for hx of colon polps  -Transfuse <7 PRN    -Iron studies: Transferrin: 11, TIBC: 170, Iron: 18, Ferritin: 414    # Primary HTN   - BP has been stable off (D/Cd Losartan)  - continue to monitor can add back ARB low dose if BP becomes elevated persistently      #RHETT  # Hypoxemia  -CPAP HS  -Albuterol PRN  -On Flovent at home (not available here)  - weaned off Oxygen and saturating well on RA  - pulmonary consult reviewed  - Incentive spirometry    #Gout  -Colchicine 0.6mg daily  -Uric acid 11/12- 6.8     # R Soleal Vein DVT  - Duplex U/S showed DVT below the knee, R soleal vein thrombosis 11/14  - Aspirin 325 mg daily  - Plavix 75 mg daily  - Lovenox 40 mg SubQ daily 11/15  - repeat doppler 11/21 with persistent DVT, no propagation.   --f/u weekly dopplers.     #Depression  -Paxil 30mg daily     # Hypernatremia  - resolved  - continue to monitor

## 2024-11-30 PROCEDURE — 99232 SBSQ HOSP IP/OBS MODERATE 35: CPT

## 2024-11-30 RX ADMIN — TIZANIDINE 2 MILLIGRAM(S): 4 TABLET ORAL at 21:18

## 2024-11-30 RX ADMIN — ACETAMINOPHEN 500MG 650 MILLIGRAM(S): 500 TABLET, COATED ORAL at 04:29

## 2024-11-30 RX ADMIN — ENOXAPARIN SODIUM 40 MILLIGRAM(S): 30 INJECTION SUBCUTANEOUS at 17:07

## 2024-11-30 RX ADMIN — PAROXETINE HYDROCHLORIDE HEMIHYDRATE 30 MILLIGRAM(S): 37.5 TABLET, FILM COATED, EXTENDED RELEASE ORAL at 11:55

## 2024-11-30 RX ADMIN — CLOPIDOGREL 75 MILLIGRAM(S): 75 TABLET, FILM COATED ORAL at 11:55

## 2024-11-30 RX ADMIN — ACETAMINOPHEN, DIPHENHYDRAMINE HCL, PHENYLEPHRINE HCL 9 MILLIGRAM(S): 325; 25; 5 TABLET ORAL at 21:18

## 2024-11-30 RX ADMIN — PANTOPRAZOLE SODIUM 40 MILLIGRAM(S): 40 TABLET, DELAYED RELEASE ORAL at 04:29

## 2024-11-30 RX ADMIN — TAMSULOSIN HYDROCHLORIDE 0.4 MILLIGRAM(S): 0.4 CAPSULE ORAL at 21:18

## 2024-11-30 RX ADMIN — Medication 0.6 MILLIGRAM(S): at 11:56

## 2024-11-30 RX ADMIN — ACETAMINOPHEN 500MG 650 MILLIGRAM(S): 500 TABLET, COATED ORAL at 19:54

## 2024-11-30 RX ADMIN — ROSUVASTATIN CALCIUM 5 MILLIGRAM(S): 5 TABLET, FILM COATED ORAL at 11:55

## 2024-11-30 RX ADMIN — ACETAMINOPHEN 500MG 650 MILLIGRAM(S): 500 TABLET, COATED ORAL at 20:54

## 2024-11-30 RX ADMIN — Medication 2 PUFF(S): at 05:39

## 2024-11-30 RX ADMIN — Medication 2 PUFF(S): at 09:43

## 2024-11-30 RX ADMIN — ACETAMINOPHEN 500MG 650 MILLIGRAM(S): 500 TABLET, COATED ORAL at 05:29

## 2024-11-30 RX ADMIN — Medication 2 PUFF(S): at 21:33

## 2024-11-30 RX ADMIN — Medication 1000 UNIT(S): at 11:55

## 2024-11-30 RX ADMIN — Medication 325 MILLIGRAM(S): at 11:55

## 2024-11-30 RX ADMIN — DONEPEZIL HYDROCHLORIDE 5 MILLIGRAM(S): 5 TABLET, FILM COATED ORAL at 08:43

## 2024-11-30 NOTE — PROGRESS NOTE ADULT - SUBJECTIVE AND OBJECTIVE BOX
Patient is a 69y old  Male who presents with a chief complaint of L MCA infarct w/ hemorrhagic conversion (30 Nov 2024 09:09)      Patient examined and interviewed. There were no acute medical events yesterday or overnight and patient is without complaints this morning.    ALLERGIES:  penicillin (Swelling)    MEDICATIONS  (STANDING):  albuterol    90 MICROgram(s) HFA Inhaler 2 Puff(s) Inhalation every 6 hours  aspirin 325 milliGRAM(s) Oral daily  cholecalciferol 1000 Unit(s) Oral daily  clopidogrel Tablet 75 milliGRAM(s) Oral daily  colchicine 0.6 milliGRAM(s) Oral daily  donepezil 5 milliGRAM(s) Oral with breakfast  enoxaparin Injectable 40 milliGRAM(s) SubCutaneous every 24 hours  melatonin 9 milliGRAM(s) Oral at bedtime  pantoprazole    Tablet 40 milliGRAM(s) Oral before breakfast  PARoxetine 30 milliGRAM(s) Oral daily  rosuvastatin 5 milliGRAM(s) Oral daily  tamsulosin 0.4 milliGRAM(s) Oral at bedtime  tiZANidine 2 milliGRAM(s) Oral at bedtime    MEDICATIONS  (PRN):  acetaminophen     Tablet .. 650 milliGRAM(s) Oral every 6 hours PRN Mild Pain (1 - 3)  atropine 1% Ophthalmic Solution for SubLingual Use 1 Drop(s) SubLingual every 8 hours PRN cough, and increased upper airway secretions  bisacodyl Suppository 10 milliGRAM(s) Rectal daily PRN Constipation  polyethylene glycol 3350 17 Gram(s) Oral daily PRN Constipation  senna 2 Tablet(s) Oral at bedtime PRN Constipation  simethicone 80 milliGRAM(s) Chew four times a day PRN Dyspepsia    Vital Signs Last 24 Hrs  T(F): 97.7 (30 Nov 2024 08:22), Max: 97.8 (29 Nov 2024 19:00)  HR: 61 (30 Nov 2024 09:43) (50 - 81)  BP: 131/83 (30 Nov 2024 08:22) (122/73 - 131/83)  RR: 16 (30 Nov 2024 08:22) (16 - 16)  SpO2: 96% (30 Nov 2024 09:43) (95% - 96%)  I&O's Summary    29 Nov 2024 07:01  -  30 Nov 2024 07:00  --------------------------------------------------------  IN: 0 mL / OUT: 2400 mL / NET: -2400 mL    30 Nov 2024 07:01  -  30 Nov 2024 10:14  --------------------------------------------------------  IN: 0 mL / OUT: 450 mL / NET: -450 mL                PHYSICAL EXAM:  General: NAD, alert responsive  ENT: MMM  Neck: Supple, No JVD  Lungs: Clear to auscultation bilaterally  Cardio: RRR, S1/S2, No murmurs  Abdomen: Soft, Nontender, Nondistended; Bowel sounds present  Extremities: No calf tenderness, No pitting edema  Neuro : dysarthria expressive aphasia rt HP no new deficits        LABS:                        10.8   5.13  )-----------( 190      ( 29 Nov 2024 05:31 )             32.8       11-29    145  |  108  |  15  ----------------------------<  102  3.6   |  29  |  1.02    Ca    8.8      29 Nov 2024 05:31    TPro  5.7  /  Alb  2.5  /  TBili  0.4  /  DBili  x   /  AST  26  /  ALT  31  /  AlkPhos  62  11-29                            Urinalysis Basic - ( 29 Nov 2024 05:31 )    Color: x / Appearance: x / SG: x / pH: x  Gluc: 102 mg/dL / Ketone: x  / Bili: x / Urobili: x   Blood: x / Protein: x / Nitrite: x   Leuk Esterase: x / RBC: x / WBC x   Sq Epi: x / Non Sq Epi: x / Bacteria: x        COVID-19 PCR: Prema (11-13-24 @ 22:33)

## 2024-11-30 NOTE — PROGRESS NOTE ADULT - ASSESSMENT
TARUN CRESPO is a 70 y/o M w/ PMHx HLD, depression, GERD, gout, colonic polyps,  congenital absence of one kidney, recent cholecystitis s/p cholecystectomy (6/24), RHETT on CPAP orginially presented to [facility] on 11/3 with aphasia, R sided weakness and R facial droop and vigorous cough x 2 weeks s/p COVID vaccine. 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  S/p L ICA terminus occulusion a/p L ICA thrombectomy, no stent, 5 passes TICI 2C  w/ underling L petrous ICA dissection on 11/3 w/ Dr. March. Now admitted to [facility] for functional deficits for initiation of a multidisciplinary rehab program     #L MCA stroke w/ hemorrhagic conversion  #L petrous ICA dissection w/ pesudoaneurysm  -CTH 11/3: hyperdense appearance of L basal ganglia and L frontal lobe gyri, mild LCA edema 2/2 recent ischemia  -CTA H/N 11/3: no evidence of significant stenosis affecting the extra cranial carotid or vertebral arteries. No evidence of aneurysm or significant vascular stenosis at [place]  -Repeat CTH 11/4: post recent endovascular revascularization of the left internal carotid artery and left middle cerebral artery. Minimally less conspicuous hyperdense appearance of left basal ganglia and left frontal lobe gyri which may represent expected evolution of contrast staining  -MRI brain 11/5: evolving recent infarct in L ICA associated with hemorrhagic transformation of the L basal ganglia. Effacement of cerebral sulci with mass effect causing 0.6 mm rightward shift.   -CTH 11/9: evolving acute to subacute left MCA infarct with evolving left basal ganglia hemorrhagic transformation, new punctuate foci of increased hyperdensity. Stable mass effect and 5mm rightward midline shift  -CTA 11/9: stable left internal carotid artery pseudoaneurysm immediately inferior to the petrous portion of the L ICA at the area of focal dissection.  -S/p L ICA terminus occulusion a/p L ICA thrombectomy, no stent, 5 passes TICI 2C  w/ underling L petrous ICA dissection on 11/3 w/ Dr. March.   -A1c 5.0; LDL 60  -Aspirin 325mg daily  -Plavix 75mg daily   rosuvastatin 5mg QD  -Will be on DAPT for 3 months and follow up with outpt neurology  -F/u outpatient cardiology for Zio-patch to r/o cardioembolic source --ILR to be considered as outpatient    #Urinary Retention, repeated PVR > 500cc,   - jerome inserted  - continue flomax  - treat constipation aggressively   - Macrobid since 11/20 for UTI ( E. Faecalis ), afebrile, no leukocytosis on labs. Completed 7 day course  - plan for voiding trial in the next few days    #Elevated PSA  - pt will need o/p F/U with Urology      # Normocytic anemia (stable)   -Monitor H/H (12.2/37.2- on 11/14), most recent Hb 11.6  -F/u outpatient colonoscopy for hx of colon polps  -Transfuse <7 PRN    -Iron studies: Transferrin: 11, TIBC: 170, Iron: 18, Ferritin: 414    # Primary HTN   - BP has been stable off (D/Cd Losartan)  - continue to monitor can add back ARB low dose if BP becomes elevated persistently      #RHETT  # Hypoxemia  -CPAP HS  -Albuterol PRN  -On Flovent at home (not available here)  - weaned off Oxygen and saturating well on RA  - pulmonary consult reviewed  - Incentive spirometry    #Gout  -Colchicine 0.6mg daily  -Uric acid 11/12- 6.8     # R Soleal Vein DVT  - Duplex U/S showed DVT below the knee, R soleal vein thrombosis 11/14  - Aspirin 325 mg daily  - Plavix 75 mg daily  - Lovenox 40 mg SubQ daily 11/15  - repeat doppler 11/21 with persistent DVT, no propagation.   --f/u weekly dopplers.     #Depression  -Paxil 30mg daily     # Hypernatremia  - resolved  - continue to monitor

## 2024-11-30 NOTE — PROGRESS NOTE ADULT - SUBJECTIVE AND OBJECTIVE BOX
Cc: Impaired mobility     HPI: Patient resting comfortably.  Denies pain.  Has been tolerating rehabilitation program.    acetaminophen     Tablet .. 650 milliGRAM(s) Oral every 6 hours PRN  albuterol    90 MICROgram(s) HFA Inhaler 2 Puff(s) Inhalation every 6 hours  aspirin 325 milliGRAM(s) Oral daily  atropine 1% Ophthalmic Solution for SubLingual Use 1 Drop(s) SubLingual every 8 hours PRN  bisacodyl Suppository 10 milliGRAM(s) Rectal daily PRN  cholecalciferol 1000 Unit(s) Oral daily  clopidogrel Tablet 75 milliGRAM(s) Oral daily  colchicine 0.6 milliGRAM(s) Oral daily  donepezil 5 milliGRAM(s) Oral with breakfast  enoxaparin Injectable 40 milliGRAM(s) SubCutaneous every 24 hours  melatonin 9 milliGRAM(s) Oral at bedtime  pantoprazole    Tablet 40 milliGRAM(s) Oral before breakfast  PARoxetine 30 milliGRAM(s) Oral daily  polyethylene glycol 3350 17 Gram(s) Oral daily PRN  rosuvastatin 5 milliGRAM(s) Oral daily  senna 2 Tablet(s) Oral at bedtime PRN  simethicone 80 milliGRAM(s) Chew four times a day PRN  tamsulosin 0.4 milliGRAM(s) Oral at bedtime  tiZANidine 2 milliGRAM(s) Oral at bedtime      T(C): 36.5 (11-30-24 @ 08:22), Max: 36.6 (11-29-24 @ 19:00)  HR: 61 (11-30-24 @ 08:22) (50 - 81)  BP: 131/83 (11-30-24 @ 08:22) (122/73 - 131/83)  RR: 16 (11-30-24 @ 08:22) (16 - 16)  SpO2: 96% (11-30-24 @ 08:22) (95% - 96%)    In NAD  HEENT- EOMI  Heart- No cyanosis   Lungs- No respiratory distress   Abd- Non-distended, Non-tender   Ext- No ertyhema   Neuro- Exam unchanged        Imp: Patient with diagnosis of L MCA stroke w/ hemorrhagic conversion/L petrous ICA dissection w/ pesudoaneurysm admitted for comprehensive acute rehabilitation.    Plan:  -Impaired mobility - Continue PT/OT  - DVT prophylaxis - Lovenox  - Skin- Turn q2h, check skin daily  - Continue current medications  -Active issues-   -CVA - aspirin, plavix, rosuvastatin  -Constipation -  miralax, senna  -Mood/depression -  paroxetine  -Appreciate internal medicine consultation   - Patient to continue current rehabilitation program.

## 2024-12-01 PROCEDURE — 99232 SBSQ HOSP IP/OBS MODERATE 35: CPT

## 2024-12-01 RX ORDER — ALBUTEROL 90 MCG
2 AEROSOL (GRAM) INHALATION EVERY 6 HOURS
Refills: 0 | Status: DISCONTINUED | OUTPATIENT
Start: 2024-12-01 | End: 2024-12-05

## 2024-12-01 RX ADMIN — ENOXAPARIN SODIUM 40 MILLIGRAM(S): 30 INJECTION SUBCUTANEOUS at 16:48

## 2024-12-01 RX ADMIN — TAMSULOSIN HYDROCHLORIDE 0.4 MILLIGRAM(S): 0.4 CAPSULE ORAL at 21:45

## 2024-12-01 RX ADMIN — ACETAMINOPHEN, DIPHENHYDRAMINE HCL, PHENYLEPHRINE HCL 9 MILLIGRAM(S): 325; 25; 5 TABLET ORAL at 21:45

## 2024-12-01 RX ADMIN — ROSUVASTATIN CALCIUM 5 MILLIGRAM(S): 5 TABLET, FILM COATED ORAL at 11:14

## 2024-12-01 RX ADMIN — Medication 325 MILLIGRAM(S): at 11:16

## 2024-12-01 RX ADMIN — PANTOPRAZOLE SODIUM 40 MILLIGRAM(S): 40 TABLET, DELAYED RELEASE ORAL at 05:40

## 2024-12-01 RX ADMIN — Medication 2 PUFF(S): at 09:42

## 2024-12-01 RX ADMIN — DONEPEZIL HYDROCHLORIDE 5 MILLIGRAM(S): 5 TABLET, FILM COATED ORAL at 07:54

## 2024-12-01 RX ADMIN — Medication 1000 UNIT(S): at 11:13

## 2024-12-01 RX ADMIN — TIZANIDINE 2 MILLIGRAM(S): 4 TABLET ORAL at 21:45

## 2024-12-01 RX ADMIN — ACETAMINOPHEN 500MG 650 MILLIGRAM(S): 500 TABLET, COATED ORAL at 18:38

## 2024-12-01 RX ADMIN — CLOPIDOGREL 75 MILLIGRAM(S): 75 TABLET, FILM COATED ORAL at 11:15

## 2024-12-01 RX ADMIN — Medication 0.6 MILLIGRAM(S): at 11:14

## 2024-12-01 RX ADMIN — ACETAMINOPHEN 500MG 650 MILLIGRAM(S): 500 TABLET, COATED ORAL at 17:38

## 2024-12-01 RX ADMIN — PAROXETINE HYDROCHLORIDE HEMIHYDRATE 30 MILLIGRAM(S): 37.5 TABLET, FILM COATED, EXTENDED RELEASE ORAL at 11:14

## 2024-12-01 NOTE — PROGRESS NOTE ADULT - NSPROGADDITIONALINFOA_GEN_ALL_CORE
I have personally interviewed and examined this patient, reviewed pertinent clinical information, and performed the evaluation and management services provided at today's visit for inpatient medical follow up    I am available to discuss any issues related to the medical care of this patient on the unit this morning, through [employer] Teams Chat or by phone at [phone]    will d/w team @[facility]
I have personally interviewed and examined this patient, reviewed pertinent clinical information, and performed the evaluation and management services provided at today's visit for inpatient medical follow up    I am available to discuss any issues related to the medical care of this patient on the unit this morning, through [employer] Teams Chat or by phone at [phone]    will d/w team @[facility]
I have personally interviewed and examined this patient, reviewed pertinent clinical information, and performed the evaluation and management services provided at today's visit for inpatient medical follow up    I am available to discuss any issues related to the medical care of this patient on the unit this morning, through [employer] Teams Chat or by phone at [phone]    d/w team @[facility]
I have personally interviewed and examined this patient, reviewed pertinent clinical information, and performed the evaluation and management services provided at today's visit for inpatient medical follow up    I am available to discuss any issues related to the medical care of this patient on the unit this morning, through [employer] Teams Chat or by phone at [phone]    will d/w team @[facility]

## 2024-12-01 NOTE — PROGRESS NOTE ADULT - SUBJECTIVE AND OBJECTIVE BOX
Cc: Impaired mobility     HPI: Patient resting comfortably.    Has been tolerating rehabilitation program.    acetaminophen     Tablet .. 650 milliGRAM(s) Oral every 6 hours PRN  albuterol    90 MICROgram(s) HFA Inhaler 2 Puff(s) Inhalation every 6 hours  aspirin 325 milliGRAM(s) Oral daily  atropine 1% Ophthalmic Solution for SubLingual Use 1 Drop(s) SubLingual every 8 hours PRN  bisacodyl Suppository 10 milliGRAM(s) Rectal daily PRN  cholecalciferol 1000 Unit(s) Oral daily  clopidogrel Tablet 75 milliGRAM(s) Oral daily  colchicine 0.6 milliGRAM(s) Oral daily  donepezil 5 milliGRAM(s) Oral with breakfast  enoxaparin Injectable 40 milliGRAM(s) SubCutaneous every 24 hours  melatonin 9 milliGRAM(s) Oral at bedtime  pantoprazole    Tablet 40 milliGRAM(s) Oral before breakfast  PARoxetine 30 milliGRAM(s) Oral daily  polyethylene glycol 3350 17 Gram(s) Oral daily PRN  rosuvastatin 5 milliGRAM(s) Oral daily  senna 2 Tablet(s) Oral at bedtime PRN  simethicone 80 milliGRAM(s) Chew four times a day PRN  tamsulosin 0.4 milliGRAM(s) Oral at bedtime  tiZANidine 2 milliGRAM(s) Oral at bedtime      T(C): 36.5 (12-01-24 @ 07:47), Max: 36.5 (12-01-24 @ 07:47)  HR: 55 (12-01-24 @ 07:47) (55 - 61)  BP: 134/81 (12-01-24 @ 07:47) (117/75 - 134/81)  RR: 14 (12-01-24 @ 07:47) (14 - 15)  SpO2: 97% (12-01-24 @ 07:47) (95% - 97%)    In NAD  HEENT- EOMI  Heart- No cyanosis   Lungs- No respiratory distress   Abd- Non-distended, Non-tender   Ext- No erythema   Neuro- Exam unchanged        Imp: Patient with diagnosis of L MCA stroke w/ hemorrhagic conversion/L petrous ICA dissection w/ pesudoaneurysm admitted for comprehensive acute rehabilitation.    Plan:  -Impaired mobility - Continue PT/OT  - DVT prophylaxis - Lovenox  - Skin- Turn q2h, check skin daily  - Continue current medications  -Active issues-   -CVA - aspirin, plavix, rosuvastatin  -Constipation -  miralax, senna  -Mood/depression -  paroxetine  -Constipation/Bowel Program -  miralax, senna prn   -Appreciate internal medicine consultation   - Patient to continue current rehabilitation program.

## 2024-12-01 NOTE — PROGRESS NOTE ADULT - SUBJECTIVE AND OBJECTIVE BOX
Patient is a 69y old  Male who presents with a chief complaint of L MCA infarct w/ hemorrhagic conversion (01 Dec 2024 08:52)      Patient examined and interviewed. There were no acute medical events yesterday or overnight and patient is without complaints this morning.      ALLERGIES:  penicillin (Swelling)    MEDICATIONS  (STANDING):  albuterol    90 MICROgram(s) HFA Inhaler 2 Puff(s) Inhalation every 6 hours  aspirin 325 milliGRAM(s) Oral daily  cholecalciferol 1000 Unit(s) Oral daily  clopidogrel Tablet 75 milliGRAM(s) Oral daily  colchicine 0.6 milliGRAM(s) Oral daily  donepezil 5 milliGRAM(s) Oral with breakfast  enoxaparin Injectable 40 milliGRAM(s) SubCutaneous every 24 hours  melatonin 9 milliGRAM(s) Oral at bedtime  pantoprazole    Tablet 40 milliGRAM(s) Oral before breakfast  PARoxetine 30 milliGRAM(s) Oral daily  rosuvastatin 5 milliGRAM(s) Oral daily  tamsulosin 0.4 milliGRAM(s) Oral at bedtime  tiZANidine 2 milliGRAM(s) Oral at bedtime    MEDICATIONS  (PRN):  acetaminophen     Tablet .. 650 milliGRAM(s) Oral every 6 hours PRN Mild Pain (1 - 3)  atropine 1% Ophthalmic Solution for SubLingual Use 1 Drop(s) SubLingual every 8 hours PRN cough, and increased upper airway secretions  bisacodyl Suppository 10 milliGRAM(s) Rectal daily PRN Constipation  polyethylene glycol 3350 17 Gram(s) Oral daily PRN Constipation  senna 2 Tablet(s) Oral at bedtime PRN Constipation  simethicone 80 milliGRAM(s) Chew four times a day PRN Dyspepsia    Vital Signs Last 24 Hrs  T(F): 97.7 (01 Dec 2024 07:47), Max: 97.7 (01 Dec 2024 07:47)  HR: 55 (01 Dec 2024 07:47) (55 - 61)  BP: 134/81 (01 Dec 2024 07:47) (117/75 - 134/81)  RR: 14 (01 Dec 2024 07:47) (14 - 15)  SpO2: 97% (01 Dec 2024 07:47) (95% - 97%)  I&O's Summary    30 Nov 2024 07:01  -  01 Dec 2024 07:00  --------------------------------------------------------  IN: 480 mL / OUT: 1100 mL / NET: -620 mL    01 Dec 2024 07:01  -  01 Dec 2024 09:38  --------------------------------------------------------  IN: 0 mL / OUT: 450 mL / NET: -450 mL                PHYSICAL EXAM:  General: NAD, alert   ENT: MMM  Neck: Supple, No JVD  Lungs: Clear to auscultation bilaterally  Cardio: RRR, S1/S2, No murmurs  Abdomen: Soft, Nontender, Nondistended; Bowel sounds present  Extremities: No calf tenderness, No pitting edema  Neuro expressive aphasia rt hP no new deficits    LABS:                        10.8   5.13  )-----------( 190      ( 29 Nov 2024 05:31 )             32.8       11-29    145  |  108  |  15  ----------------------------<  102  3.6   |  29  |  1.02    Ca    8.8      29 Nov 2024 05:31    TPro  5.7  /  Alb  2.5  /  TBili  0.4  /  DBili  x   /  AST  26  /  ALT  31  /  AlkPhos  62  11-29                            Urinalysis Basic - ( 29 Nov 2024 05:31 )    Color: x / Appearance: x / SG: x / pH: x  Gluc: 102 mg/dL / Ketone: x  / Bili: x / Urobili: x   Blood: x / Protein: x / Nitrite: x   Leuk Esterase: x / RBC: x / WBC x   Sq Epi: x / Non Sq Epi: x / Bacteria: x        COVID-19 PCR: Prema (11-13-24 @ 22:33)

## 2024-12-01 NOTE — PROGRESS NOTE ADULT - ASSESSMENT
TARUN CRESPO is a 70 y/o M w/ PMHx HLD, depression, GERD, gout, colonic polyps,  congenital absence of one kidney, recent cholecystitis s/p cholecystectomy (6/24), RHETT on CPAP orginially presented to [facility] on 11/3 with aphasia, R sided weakness and R facial droop and vigorous cough x 2 weeks s/p COVID vaccine. 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  S/p L ICA terminus occulusion a/p L ICA thrombectomy, no stent, 5 passes TICI 2C  w/ underling L petrous ICA dissection on 11/3 w/ Dr. March. Now admitted to [facility] for functional deficits for initiation of a multidisciplinary rehab program     #L MCA stroke w/ hemorrhagic conversion  #L petrous ICA dissection w/ pesudoaneurysm  -CTH 11/3: hyperdense appearance of L basal ganglia and L frontal lobe gyri, mild LCA edema 2/2 recent ischemia  -CTA H/N 11/3: no evidence of significant stenosis affecting the extra cranial carotid or vertebral arteries. No evidence of aneurysm or significant vascular stenosis at [place] [facility] [place]  -Repeat CTH 11/4: post recent endovascular revascularization of the left internal carotid artery and left middle cerebral artery. Minimally less conspicuous hyperdense appearance of left basal ganglia and left frontal lobe gyri which may represent expected evolution of contrast staining  -MRI brain 11/5: evolving recent infarct in L ICA associated with hemorrhagic transformation of the L basal ganglia. Effacement of cerebral sulci with mass effect causing 0.6 mm rightward shift.   -CTH 11/9: evolving acute to subacute left MCA infarct with evolving left basal ganglia hemorrhagic transformation, new punctuate foci of increased hyperdensity. Stable mass effect and 5mm rightward midline shift  -CTA 11/9: stable left internal carotid artery pseudoaneurysm immediately inferior to the petrous portion of the L ICA at the area of focal dissection.  -S/p L ICA terminus occulusion a/p L ICA thrombectomy, no stent, 5 passes TICI 2C  w/ underling L petrous ICA dissection on 11/3 w/ Dr. March.   -A1c 5.0; LDL 60  -Aspirin 325mg daily  -Plavix 75mg daily   rosuvastatin 5mg QD  -Will be on DAPT for 3 months and follow up with outpt neurology  -F/u outpatient cardiology for Zio-patch to r/o cardioembolic source --ILR to be considered as outpatient    #Urinary Retention, repeated PVR > 500cc,   - jerome inserted  - continue flomax  - treat constipation aggressively   - Macrobid since 11/20 for UTI ( E. Faecalis ), afebrile, no leukocytosis on labs. Completed 7 day course  - plan for voiding trial in the next few days    #Elevated PSA  - pt will need o/p F/U with Urology      # Normocytic anemia (stable)   -Monitor H/H (12.2/37.2- on 11/14), most recent Hb 11.6  -F/u outpatient colonoscopy for hx of colon polps  -Transfuse <7 PRN    -Iron studies: Transferrin: 11, TIBC: 170, Iron: 18, Ferritin: 414    # Primary HTN   - BP has been stable off (D/Cd Losartan)  - continue to monitor can add back ARB low dose if BP becomes elevated persistently      #RHETT  # Hypoxemia  -CPAP HS  -Albuterol PRN  -On Flovent at home (not available here)  - weaned off Oxygen and saturating well on RA  - pulmonary consult reviewed  - Incentive spirometry    #Gout  -Colchicine 0.6mg daily  -Uric acid 11/12- 6.8     # R Soleal Vein DVT  - Duplex U/S showed DVT below the knee, R soleal vein thrombosis 11/14  - Aspirin 325 mg daily  - Plavix 75 mg daily  - Lovenox 40 mg SubQ daily 11/15  - repeat doppler 11/21 with persistent DVT, no propagation.   --f/u weekly dopplers.     #Depression  -Paxil 30mg daily     # Hypernatremia  - resolved  - continue to monitor

## 2024-12-02 LAB
ALBUMIN SERPL ELPH-MCNC: 2.6 G/DL — LOW (ref 3.3–5)
ALP SERPL-CCNC: 64 U/L — SIGNIFICANT CHANGE UP (ref 40–120)
ALT FLD-CCNC: 23 U/L — SIGNIFICANT CHANGE UP (ref 10–45)
ANION GAP SERPL CALC-SCNC: 5 MMOL/L — SIGNIFICANT CHANGE UP (ref 5–17)
AST SERPL-CCNC: 22 U/L — SIGNIFICANT CHANGE UP (ref 10–40)
BASOPHILS # BLD AUTO: 0.02 K/UL — SIGNIFICANT CHANGE UP (ref 0–0.2)
BASOPHILS NFR BLD AUTO: 0.5 % — SIGNIFICANT CHANGE UP (ref 0–2)
BILIRUB SERPL-MCNC: 0.4 MG/DL — SIGNIFICANT CHANGE UP (ref 0.2–1.2)
BUN SERPL-MCNC: 10 MG/DL — SIGNIFICANT CHANGE UP (ref 7–23)
CALCIUM SERPL-MCNC: 9 MG/DL — SIGNIFICANT CHANGE UP (ref 8.4–10.5)
CHLORIDE SERPL-SCNC: 109 MMOL/L — HIGH (ref 96–108)
CO2 SERPL-SCNC: 32 MMOL/L — HIGH (ref 22–31)
CREAT SERPL-MCNC: 0.9 MG/DL — SIGNIFICANT CHANGE UP (ref 0.5–1.3)
EGFR: 91 ML/MIN/1.73M2 — SIGNIFICANT CHANGE UP
EOSINOPHIL # BLD AUTO: 0.32 K/UL — SIGNIFICANT CHANGE UP (ref 0–0.5)
EOSINOPHIL NFR BLD AUTO: 7.2 % — HIGH (ref 0–6)
GLUCOSE SERPL-MCNC: 103 MG/DL — HIGH (ref 70–99)
HCT VFR BLD CALC: 35.5 % — LOW (ref 39–50)
HGB BLD-MCNC: 11.7 G/DL — LOW (ref 13–17)
IMM GRANULOCYTES NFR BLD AUTO: 0.2 % — SIGNIFICANT CHANGE UP (ref 0–0.9)
LYMPHOCYTES # BLD AUTO: 1.53 K/UL — SIGNIFICANT CHANGE UP (ref 1–3.3)
LYMPHOCYTES # BLD AUTO: 34.5 % — SIGNIFICANT CHANGE UP (ref 13–44)
MCHC RBC-ENTMCNC: 30.7 PG — SIGNIFICANT CHANGE UP (ref 27–34)
MCHC RBC-ENTMCNC: 33 G/DL — SIGNIFICANT CHANGE UP (ref 32–36)
MCV RBC AUTO: 93.2 FL — SIGNIFICANT CHANGE UP (ref 80–100)
MONOCYTES # BLD AUTO: 0.44 K/UL — SIGNIFICANT CHANGE UP (ref 0–0.9)
MONOCYTES NFR BLD AUTO: 9.9 % — SIGNIFICANT CHANGE UP (ref 2–14)
NEUTROPHILS # BLD AUTO: 2.12 K/UL — SIGNIFICANT CHANGE UP (ref 1.8–7.4)
NEUTROPHILS NFR BLD AUTO: 47.7 % — SIGNIFICANT CHANGE UP (ref 43–77)
NRBC # BLD: 0 /100 WBCS — SIGNIFICANT CHANGE UP (ref 0–0)
PLATELET # BLD AUTO: 182 K/UL — SIGNIFICANT CHANGE UP (ref 150–400)
POTASSIUM SERPL-MCNC: 3.8 MMOL/L — SIGNIFICANT CHANGE UP (ref 3.5–5.3)
POTASSIUM SERPL-SCNC: 3.8 MMOL/L — SIGNIFICANT CHANGE UP (ref 3.5–5.3)
PROT SERPL-MCNC: 6 G/DL — SIGNIFICANT CHANGE UP (ref 6–8.3)
RBC # BLD: 3.81 M/UL — LOW (ref 4.2–5.8)
RBC # FLD: 12.6 % — SIGNIFICANT CHANGE UP (ref 10.3–14.5)
SODIUM SERPL-SCNC: 146 MMOL/L — HIGH (ref 135–145)
WBC # BLD: 4.44 K/UL — SIGNIFICANT CHANGE UP (ref 3.8–10.5)
WBC # FLD AUTO: 4.44 K/UL — SIGNIFICANT CHANGE UP (ref 3.8–10.5)

## 2024-12-02 PROCEDURE — 99232 SBSQ HOSP IP/OBS MODERATE 35: CPT

## 2024-12-02 RX ORDER — TIZANIDINE 4 MG/1
1 TABLET ORAL
Qty: 0 | Refills: 0 | DISCHARGE
Start: 2024-12-02

## 2024-12-02 RX ORDER — ALBUTEROL 90 MCG
2 AEROSOL (GRAM) INHALATION
Qty: 0 | Refills: 0 | DISCHARGE
Start: 2024-12-02

## 2024-12-02 RX ADMIN — Medication 1000 UNIT(S): at 11:45

## 2024-12-02 RX ADMIN — TIZANIDINE 2 MILLIGRAM(S): 4 TABLET ORAL at 22:04

## 2024-12-02 RX ADMIN — ROSUVASTATIN CALCIUM 5 MILLIGRAM(S): 5 TABLET, FILM COATED ORAL at 11:47

## 2024-12-02 RX ADMIN — ENOXAPARIN SODIUM 40 MILLIGRAM(S): 30 INJECTION SUBCUTANEOUS at 17:53

## 2024-12-02 RX ADMIN — CLOPIDOGREL 75 MILLIGRAM(S): 75 TABLET, FILM COATED ORAL at 11:46

## 2024-12-02 RX ADMIN — PANTOPRAZOLE SODIUM 40 MILLIGRAM(S): 40 TABLET, DELAYED RELEASE ORAL at 05:30

## 2024-12-02 RX ADMIN — TAMSULOSIN HYDROCHLORIDE 0.4 MILLIGRAM(S): 0.4 CAPSULE ORAL at 22:04

## 2024-12-02 RX ADMIN — Medication 325 MILLIGRAM(S): at 11:46

## 2024-12-02 RX ADMIN — ACETAMINOPHEN, DIPHENHYDRAMINE HCL, PHENYLEPHRINE HCL 9 MILLIGRAM(S): 325; 25; 5 TABLET ORAL at 22:04

## 2024-12-02 RX ADMIN — PAROXETINE HYDROCHLORIDE HEMIHYDRATE 30 MILLIGRAM(S): 37.5 TABLET, FILM COATED, EXTENDED RELEASE ORAL at 11:46

## 2024-12-02 RX ADMIN — DONEPEZIL HYDROCHLORIDE 5 MILLIGRAM(S): 5 TABLET, FILM COATED ORAL at 09:49

## 2024-12-02 RX ADMIN — ACETAMINOPHEN 500MG 650 MILLIGRAM(S): 500 TABLET, COATED ORAL at 19:52

## 2024-12-02 RX ADMIN — ACETAMINOPHEN 500MG 650 MILLIGRAM(S): 500 TABLET, COATED ORAL at 20:40

## 2024-12-02 RX ADMIN — Medication 0.6 MILLIGRAM(S): at 11:46

## 2024-12-02 NOTE — PROGRESS NOTE ADULT - SUBJECTIVE AND OBJECTIVE BOX
HPI:  70 y/o M w/ PMHx HLD, depression, GERD, gout, colonic polyps,  congenital absence of one kidney, recent cholecystitis s/p cholecystectomy (6/24), RHETT on CPAP orginially presented to [facility] on 11/3 with aphasia, R sided weakness and R facial droop and vigorous cough x 2 weeks s/p COVID vaccine. [id number] [id number] on 11/2. 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 Patient transferred to [facility] for mechanical thrombectomy. S/p L ICA terminus occulusion a/p L ICA thrombectomy, no stent, 5 passes TICI 2C  w/ underling L petrous ICA dissection on 11/3 w/ Dr. March. Post op course c/b slight pseudoaneurysm enlargement w/ dissection distal embolisim into the terminal ICA seen on 11/4 CTA and hemorrhagic transformation of L MCA infarct.   Of note, hospital course also significant for fevers, suspect aspiration pneumonitis treated with Rocephin x 1, and dysphagia requiring easy to chew,  with mod thick liquids. EP was consulted with concerns of cardioembolic source for infarct, recommended to f/u outpatient for Zio patch.   Patient had an MBS on 11/12 which showed moderate oropharyngeal dysphagia, remarkable for delayed/reduced laryngeal vestibular closure, pharyngeal trigger delay and reduced hyolaryngeal elevation/excursion.  There was deep laryngeal penetration and possibly aspiration of thin liquid.  Patient was cleared for easy to chew GI diet with mildly thick liquids.    Patient optimized and was evaluated by PM&R and therapy for functional deficits, gait/ADL impairments and acute rehabilitation was recommended. Patient was cleared for discharge to [facility] IRU on 11/13/24.  (13 Nov 2024 15:44)          Subjective:  Seen this AM in speech therapy.  Pt. is doing well.  Tolerating therapy.  Voiding w/o issue.  Denies pain.  Slept during the night.  Pt. frustrated by aphasia.  Has some questions about discharge-- discussed TREV discharge plan for 12/5.  Pt. had other questions but was difficult to interpret through his aphasia.        VITALS  Vital Signs Last 24 Hrs  T(C): 36.4 (02 Dec 2024 08:03), Max: 36.9 (01 Dec 2024 20:20)  T(F): 97.6 (02 Dec 2024 08:03), Max: 98.4 (01 Dec 2024 20:20)  HR: 58 (02 Dec 2024 08:03) (58 - 61)  BP: 128/86 (02 Dec 2024 08:03) (128/86 - 136/83)  BP(mean): --  RR: 16 (02 Dec 2024 08:03) (14 - 16)  SpO2: 96% (02 Dec 2024 08:03) (95% - 96%)    Parameters below as of 02 Dec 2024 08:03  Patient On (Oxygen Delivery Method): room air        REVIEW OF SYMPTOMS  Neurological deficits      PHYSICAL EXAM  Constitutional - NAD, Comfortable   HEENT - NCAT, EOMI   Chest - good chest expansion, good respiratory effort,   Cardio - warm and well perfused,   Abdomen -  Soft, NTND   Extremities - No peripheral edema, No calf tenderness      Neurologic Exam:                       Cognitive -              Orientation: Awake, Alert, Orientation limited due to aphasia            Attention:  limited due to aphasia; can follow simple  commands             Memory: unable to fully test due to aphasia     Speech – + Aphasia--Nonfluent, Impaired multiple step command following, but able to follow all simple commands, impaired repetition; + dysarthria,  able to verbalize some words.       Cranial Nerves – PERRLA , EOMI, + left facial weakness; facial sensation impaired on right, + dysarthria, + dysphagia, Shoulder shrug impaired        Motor -                        LEFT    UE - ShAB 5/5, EF 5/5, EE 5/5, WE 5/5,  5/5                     RIGHT UE - ShAB 0/5, EF 0/5, EE 0/5, WE 0/5,  0/5                     LEFT    LE - HF 5/5, KE 5/5, DF 5/5, PF 5/5                     RIGHT LE - HF 0/5, KE 0/5, DF 0/5, PF 0/5        Coordination - FTN/HTS intact on the left; unable to assess due to weaknes son the right     Sensory – Impaired to LT  on right side;--     Psychiatric - Mood stable, Affect WNL     RECENT LABS                        11.7   4.44  )-----------( 182      ( 02 Dec 2024 05:28 )             35.5     12-02    146[H]  |  109[H]  |  10  ----------------------------<  103[H]  3.8   |  32[H]  |  0.90    Ca    9.0      02 Dec 2024 05:28    TPro  6.0  /  Alb  2.6[L]  /  TBili  0.4  /  DBili  x   /  AST  22  /  ALT  23  /  AlkPhos  64  12-02      Urinalysis Basic - ( 02 Dec 2024 05:28 )    Color: x / Appearance: x / SG: x / pH: x  Gluc: 103 mg/dL / Ketone: x  / Bili: x / Urobili: x   Blood: x / Protein: x / Nitrite: x   Leuk Esterase: x / RBC: x / WBC x   Sq Epi: x / Non Sq Epi: x / Bacteria: x          RADIOLOGY/OTHER RESULTS      MEDICATIONS  (STANDING):  aspirin 325 milliGRAM(s) Oral daily  cholecalciferol 1000 Unit(s) Oral daily  clopidogrel Tablet 75 milliGRAM(s) Oral daily  colchicine 0.6 milliGRAM(s) Oral daily  donepezil 5 milliGRAM(s) Oral with breakfast  enoxaparin Injectable 40 milliGRAM(s) SubCutaneous every 24 hours  melatonin 9 milliGRAM(s) Oral at bedtime  pantoprazole    Tablet 40 milliGRAM(s) Oral before breakfast  PARoxetine 30 milliGRAM(s) Oral daily  rosuvastatin 5 milliGRAM(s) Oral daily  tamsulosin 0.4 milliGRAM(s) Oral at bedtime  tiZANidine 2 milliGRAM(s) Oral at bedtime    MEDICATIONS  (PRN):  acetaminophen     Tablet .. 650 milliGRAM(s) Oral every 6 hours PRN Mild Pain (1 - 3)  albuterol    90 MICROgram(s) HFA Inhaler 2 Puff(s) Inhalation every 6 hours PRN Bronchospasm  atropine 1% Ophthalmic Solution for SubLingual Use 1 Drop(s) SubLingual every 8 hours PRN cough, and increased upper airway secretions  bisacodyl Suppository 10 milliGRAM(s) Rectal daily PRN Constipation  polyethylene glycol 3350 17 Gram(s) Oral daily PRN Constipation  senna 2 Tablet(s) Oral at bedtime PRN Constipation  simethicone 80 milliGRAM(s) Chew four times a day PRN Dyspepsia

## 2024-12-02 NOTE — PROGRESS NOTE ADULT - ASSESSMENT
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  S/p L ICA terminus occulusion a/p L ICA thrombectomy,  w/ underling L petrous ICA dissection     # L MCA stroke w/ hemorrhagic conversion  # L petrous ICA dissection w/ pesudoaneurysm  - CTH 11/9: evolving acute to subacute left MCA infarct with evolving left basal ganglia hemorrhagic transformation, new punctuate foci of increased hyperdensity. Stable mass effect and 5mm rightward midline shift  - Aspirin 325mg daily  - Plavix 75mg daily   - Rosuvastatin 5mg QD  - outpatient cardiology f/u for w/u r/o cardioembolic source  - continue comprehensive rehab program, 3 hours a day, 5 days a week.    Aphasia  - C/w Aricept 5 mg 11/26-- tolerating well    HTN---  --off all BP meds due to [place]    # R Soleal Vein DVT  - Duplex U/S showed DVT below the knee, R soleal vein thrombosis 11/14  - Lovenox 40 mg SubQ daily 11/15  - f/u weekly dopplers, last doppler 11/21 shows Persistent right soleal vein DVT. REPEAT 11/29 performed-- Deep venous thrombosis in a muscular branch to the right soleus, unchanged since 11/14/2024.    #Urinary Retention,--improving  - Voiding--but with PVRs in 200s-300s  --has not needed SC for days  -bowels reg.   - cont. flomax 0.4 (11/18)  - Patient completed macrobid on 11/27 for UTI      # Normocytic anemia (stable) vs Iron deficiency anemia   - F/u outpatient colonoscopy for hx of colon polps  - Transfuse <7 PRN    - Iron studies: Transferrin: 11, TIBC: 170, Iron: 18, Ferritin: 414  - CBC 11/29- HgB 10.8  --H&H improved 12/2 to 11.7/35.5    # RHETT  # Activity-Induced Hypoxia  - CXR- negative   - CPAP HS 5 50% O2. Poor compliance  - O2 via NC PRN  - Albuterol PRN    # Gout  - Colchicine 0.6mg daily  - Uric acid 11/12- 6.8     # Mood/Cognition  # Depression  - Paxil 30mg daily     # Sleep:   -cont. melatonin 9 qhs     # Pain Management:  - Tylenol PRN    # GI/Bowel:  - Senna QHS  - Miralax daily   - Bisacodyl suppository QD PRN  - simethicone for gas pain  - GI ppx: Pantoprazole 40mg daily     # Dysphagia:  - MBS  11/28-- upgraded to regular and thins   - 1:1 feeds        IDT 11/26  RN - incontinent of bowel jerome.   SW - condo with wife, 3-4 arely. elevator to 7th floor.  Independent prior, driving and working.  Wife is retired, main support and she drives... Leaning towards [facility]. .  SLP - MBS completed today. Upgraded regular with thin with use of cup. Will be trained with chin tuck posture. No aspiration.  OT - Setup supervision eating and grooming. Tot-A toileting. Max-A LBD. Mod of 2 for toilet transfers. Shower transfer not safe yet.    PT - Max-A BM. Mod/Max for tx. Amb 8ft with parrallel with 2 assist.   Goal 1 - use multimodalities to communicate basic wants and needs with staff max cues- progressing.     Goal 2 - transfer OOB with minimal assist- limited.   New goal: tx OOB w. mod-A.    Barriers - communication, hemiplegia, sensory impairment, aphasia, poor endurance.    admit GC 11/13.    CMG is 12/5.  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  S/p L ICA terminus occulusion a/p L ICA thrombectomy,  w/ underling L petrous ICA dissection     # L MCA stroke w/ hemorrhagic conversion  # L petrous ICA dissection w/ pesudoaneurysm  - CTH 11/9: evolving acute to subacute left MCA infarct with evolving left basal ganglia hemorrhagic transformation, new punctuate foci of increased hyperdensity. Stable mass effect and 5mm rightward midline shift  - Aspirin 325mg daily  - Plavix 75mg daily   - Rosuvastatin 5mg QD  - outpatient cardiology f/u for w/u r/o cardioembolic source  - continue comprehensive rehab program, 3 hours a day, 5 days a week.    Aphasia  - C/w Aricept 5 mg 11/26-- tolerating well    HTN---  --off all BP meds due to [place]    Hypernatremia  --Na 146 today  --Repeat BMP tomorrow AM    # R Soleal Vein DVT  - Duplex U/S showed DVT below the knee, R soleal vein thrombosis 11/14  - Lovenox 40 mg SubQ daily 11/15  - f/u weekly dopplers, last doppler 11/21 shows Persistent right soleal vein DVT. REPEAT 11/29 performed-- Deep venous thrombosis in a muscular branch to the right soleus, unchanged since 11/14/2024.    #Urinary Retention,--improving  - Voiding--but with PVRs in 200s-300s  --has not needed SC for days  -bowels reg.   - cont. flomax 0.4 (11/18)  - Patient completed macrobid on 11/27 for UTI      # Normocytic anemia (stable) vs Iron deficiency anemia   - F/u outpatient colonoscopy for hx of colon polps  - Transfuse <7 PRN    - Iron studies: Transferrin: 11, TIBC: 170, Iron: 18, Ferritin: 414  - CBC 11/29- HgB 10.8  --H&H improved 12/2 to 11.7/35.5    # RHETT  # Activity-Induced Hypoxia  - CXR- negative   - CPAP HS 5 50% O2. Poor compliance  - O2 via NC PRN  - Albuterol PRN    # Gout  - Colchicine 0.6mg daily  - Uric acid 11/12- 6.8     # Mood/Cognition  # Depression  - Paxil 30mg daily     # Sleep:   -cont. melatonin 9 qhs     # Pain Management:  - Tylenol PRN    # GI/Bowel:  - Senna QHS  - Miralax daily   - Bisacodyl suppository QD PRN  - simethicone for gas pain  - GI ppx: Pantoprazole 40mg daily     # Dysphagia:  - MBS  11/28-- upgraded to regular and thins   - 1:1 feeds        IDT 11/26  RN - incontinent of bowel jerome.   SW - condo with wife, 3-4 arely. elevator to 7th floor.  Independent prior, driving and working.  Wife is retired, main support and she drives... Leaning towards [facility]. .  SLP - MBS completed today. Upgraded regular with thin with use of cup. Will be trained with chin tuck posture. No aspiration.  OT - Setup supervision eating and grooming. Tot-A toileting. Max-A LBD. Mod of 2 for toilet transfers. Shower transfer not safe yet.    PT - Max-A BM. Mod/Max for tx. Amb 8ft with parrallel with 2 assist.   Goal 1 - use multimodalities to communicate basic wants and needs with staff max cues- progressing.     Goal 2 - transfer OOB with minimal assist- limited.   New goal: tx OOB w. mod-A.    Barriers - communication, hemiplegia, sensory impairment, aphasia, poor endurance.    admit GC 11/13.    CMG is 12/5.  DC 12/5 to [facility]

## 2024-12-02 NOTE — PROGRESS NOTE ADULT - SUBJECTIVE AND OBJECTIVE BOX
Patient is a 69y old  Male who presents with a chief complaint of L MCA infarct w/ hemorrhagic conversion (01 Dec 2024 08:52)    Patient examined and examined. No new complaints. No acute evnts overnight.    ALLERGIES:  penicillin (Swelling)    MEDICATIONS  (STANDING):  aspirin 325 milliGRAM(s) Oral daily  cholecalciferol 1000 Unit(s) Oral daily  clopidogrel Tablet 75 milliGRAM(s) Oral daily  colchicine 0.6 milliGRAM(s) Oral daily  donepezil 5 milliGRAM(s) Oral with breakfast  enoxaparin Injectable 40 milliGRAM(s) SubCutaneous every 24 hours  melatonin 9 milliGRAM(s) Oral at bedtime  pantoprazole    Tablet 40 milliGRAM(s) Oral before breakfast  PARoxetine 30 milliGRAM(s) Oral daily  rosuvastatin 5 milliGRAM(s) Oral daily  tamsulosin 0.4 milliGRAM(s) Oral at bedtime  tiZANidine 2 milliGRAM(s) Oral at bedtime    MEDICATIONS  (PRN):  acetaminophen     Tablet .. 650 milliGRAM(s) Oral every 6 hours PRN Mild Pain (1 - 3)  albuterol    90 MICROgram(s) HFA Inhaler 2 Puff(s) Inhalation every 6 hours PRN Bronchospasm  atropine 1% Ophthalmic Solution for SubLingual Use 1 Drop(s) SubLingual every 8 hours PRN cough, and increased upper airway secretions  bisacodyl Suppository 10 milliGRAM(s) Rectal daily PRN Constipation  polyethylene glycol 3350 17 Gram(s) Oral daily PRN Constipation  senna 2 Tablet(s) Oral at bedtime PRN Constipation  simethicone 80 milliGRAM(s) Chew four times a day PRN Dyspepsia    T(C): 36.4 (12-02-24 @ 08:03), Max: 36.9 (12-01-24 @ 20:20)  T(F): 97.6 (12-02-24 @ 08:03), Max: 98.4 (12-01-24 @ 20:20)  HR: 58 (12-02-24 @ 08:03) (58 - 61)  BP: 128/86 (12-02-24 @ 08:03) (128/86 - 136/83)  ABP: --  ABP(mean): --  RR: 16 (12-02-24 @ 08:03) (14 - 16)  SpO2: 96% (12-02-24 @ 08:03) (95% - 96%)      PHYSICAL EXAM:  General: NAD, alert   ENT: MMM  Neck: Supple, No JVD  Lungs: Clear to auscultation bilaterally  Cardio: RRR, S1/S2, No murmurs  Abdomen: Soft, Nontender, Nondistended; Bowel sounds present  Extremities: No calf tenderness, No pitting edema  Neuro expressive aphasia rt hP no new deficits    LABS:                        11.7   4.44  )-----------( 182      ( 02 Dec 2024 05:28 )             35.5     12-02    146[H]  |  109[H]  |  10  ----------------------------<  103[H]  3.8   |  32[H]  |  0.90    Ca    9.0      02 Dec 2024 05:28    TPro  6.0  /  Alb  2.6[L]  /  TBili  0.4  /  DBili  x   /  AST  22  /  ALT  23  /  AlkPhos  64  12-02      Urinalysis Basic - ( 02 Dec 2024 05:28 )    Color: x / Appearance: x / SG: x / pH: x  Gluc: 103 mg/dL / Ketone: x  / Bili: x / Urobili: x   Blood: x / Protein: x / Nitrite: x   Leuk Esterase: x / RBC: x / WBC x   Sq Epi: x / Non Sq Epi: x / Bacteria: x       CAPILLARY BLOOD GLUCOSE            Urinalysis Basic - ( 02 Dec 2024 05:28 )    Color: x / Appearance: x / SG: x / pH: x  Gluc: 103 mg/dL / Ketone: x  / Bili: x / Urobili: x   Blood: x / Protein: x / Nitrite: x   Leuk Esterase: x / RBC: x / WBC x   Sq Epi: x / Non Sq Epi: x / Bacteria: x        RADIOLOGY & ADDITIONAL TESTS:    Consultant(s) Notes Reviewed:  [x ] YES  [ ] NO  Care Discussed with Consultants/Other Providers [ x] YES  [ ] NO  Imaging Personally Reviewed:  [x ] YES  [ ] NO

## 2024-12-02 NOTE — PROGRESS NOTE ADULT - ASSESSMENT
70 y/o M w/ PMHx HLD, depression, GERD, gout, colonic polyps,  congenital absence of one kidney, recent cholecystitis s/p cholecystectomy (6/24), RHETT on CPAP orginially presented to [facility] on 11/3 with aphasia, R sided weakness and R facial droop and vigorous cough x 2 weeks s/p COVID vaccine. 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  S/p L ICA terminus occulusion a/p L ICA thrombectomy, no stent, 5 passes TICI 2C  w/ underling L petrous ICA dissection on 11/3 w/ Dr. March. Now admitted to [facility] for functional deficits for initiation of a multidisciplinary rehab program     #L MCA stroke w/ hemorrhagic conversion  #L petrous ICA dissection w/ pesudoaneurysm  -CTH 11/3: hyperdense appearance of L basal ganglia and L frontal lobe gyri, mild LCA edema 2/2 recent ischemia  -CTA H/N 11/3: no evidence of significant stenosis affecting the extra cranial carotid or vertebral arteries. No evidence of aneurysm or significant vascular stenosis at [place] of [place]  -Repeat CTH 11/4: post recent endovascular revascularization of the left internal carotid artery and left middle cerebral artery. Minimally less conspicuous hyperdense appearance of left basal ganglia and left frontal lobe gyri which may represent expected evolution of contrast staining  -MRI brain 11/5: evolving recent infarct in L ICA associated with hemorrhagic transformation of the L basal ganglia. Effacement of cerebral sulci with mass effect causing 0.6 mm rightward shift.   -CTH 11/9: evolving acute to subacute left MCA infarct with evolving left basal ganglia hemorrhagic transformation, new punctuate foci of increased hyperdensity. Stable mass effect and 5mm rightward midline shift  -CTA 11/9: stable left internal carotid artery pseudoaneurysm immediately inferior to the petrous portion of the L ICA at the area of focal dissection.  -S/p L ICA terminus occulusion a/p L ICA thrombectomy, no stent, 5 passes TICI 2C  w/ underling L petrous ICA dissection on 11/3 w/ Dr. March.   -A1c 5.0; LDL 60  -Aspirin 325mg daily  -Plavix 75mg daily   rosuvastatin 5mg QD  -Will be on DAPT for 3 months and follow up with outpt neurology  -F/u outpatient cardiology for Zio-patch to r/o cardioembolic source --ILR to be considered as outpatient    #Urinary Retention, repeated PVR > 500cc,   - jerome inserted  - continue flomax  - treat constipation aggressively   - Macrobid since 11/20 for UTI ( E. Faecalis ), afebrile, no leukocytosis on labs. Completed 7 day course  - plan for voiding trial in the next few days    #Elevated PSA  - pt will need o/p F/U with Urology      # Normocytic anemia (stable)   -Monitor H/H (12.2/37.2- on 11/14), most recent Hb 11.6  -F/u outpatient colonoscopy for hx of colon polps  -Transfuse <7 PRN    -Iron studies: Transferrin: 11, TIBC: 170, Iron: 18, Ferritin: 414    # Primary HTN   - BP has been stable off (D/Cd Losartan)  - continue to monitor can add back ARB low dose if BP becomes elevated persistently      #RHETT  # Hypoxemia  -CPAP HS  -Albuterol PRN  -On Flovent at home (not available here)  - weaned off Oxygen and saturating well on RA  - pulmonary consult reviewed  - Incentive spirometry    #Gout  -Colchicine 0.6mg daily  -Uric acid 11/12- 6.8     # R Soleal Vein DVT  - Duplex U/S showed DVT below the knee, R soleal vein thrombosis 11/14  - Aspirin 325 mg daily  - Plavix 75 mg daily  - Lovenox 40 mg SubQ daily 11/15  - repeat doppler 11/21 with persistent DVT, no propagation.   --f/u weekly dopplers.     #Depression  -Paxil 30mg daily     # Hypernatremia  - resolved  - continue to monitor    will follow, d/w rehab

## 2024-12-03 LAB
ANION GAP SERPL CALC-SCNC: 5 MMOL/L — SIGNIFICANT CHANGE UP (ref 5–17)
BUN SERPL-MCNC: 14 MG/DL — SIGNIFICANT CHANGE UP (ref 7–23)
CALCIUM SERPL-MCNC: 8.9 MG/DL — SIGNIFICANT CHANGE UP (ref 8.4–10.5)
CHLORIDE SERPL-SCNC: 107 MMOL/L — SIGNIFICANT CHANGE UP (ref 96–108)
CO2 SERPL-SCNC: 33 MMOL/L — HIGH (ref 22–31)
CREAT SERPL-MCNC: 1.02 MG/DL — SIGNIFICANT CHANGE UP (ref 0.5–1.3)
EGFR: 78 ML/MIN/1.73M2 — SIGNIFICANT CHANGE UP
GLUCOSE SERPL-MCNC: 91 MG/DL — SIGNIFICANT CHANGE UP (ref 70–99)
POTASSIUM SERPL-MCNC: 3.9 MMOL/L — SIGNIFICANT CHANGE UP (ref 3.5–5.3)
POTASSIUM SERPL-SCNC: 3.9 MMOL/L — SIGNIFICANT CHANGE UP (ref 3.5–5.3)
SODIUM SERPL-SCNC: 145 MMOL/L — SIGNIFICANT CHANGE UP (ref 135–145)

## 2024-12-03 PROCEDURE — 99232 SBSQ HOSP IP/OBS MODERATE 35: CPT

## 2024-12-03 RX ADMIN — ROSUVASTATIN CALCIUM 5 MILLIGRAM(S): 5 TABLET, FILM COATED ORAL at 11:48

## 2024-12-03 RX ADMIN — TAMSULOSIN HYDROCHLORIDE 0.4 MILLIGRAM(S): 0.4 CAPSULE ORAL at 21:32

## 2024-12-03 RX ADMIN — DONEPEZIL HYDROCHLORIDE 5 MILLIGRAM(S): 5 TABLET, FILM COATED ORAL at 07:37

## 2024-12-03 RX ADMIN — PANTOPRAZOLE SODIUM 40 MILLIGRAM(S): 40 TABLET, DELAYED RELEASE ORAL at 05:36

## 2024-12-03 RX ADMIN — Medication 1000 UNIT(S): at 11:48

## 2024-12-03 RX ADMIN — ACETAMINOPHEN, DIPHENHYDRAMINE HCL, PHENYLEPHRINE HCL 9 MILLIGRAM(S): 325; 25; 5 TABLET ORAL at 21:33

## 2024-12-03 RX ADMIN — CLOPIDOGREL 75 MILLIGRAM(S): 75 TABLET, FILM COATED ORAL at 11:47

## 2024-12-03 RX ADMIN — TIZANIDINE 2 MILLIGRAM(S): 4 TABLET ORAL at 21:33

## 2024-12-03 RX ADMIN — Medication 0.6 MILLIGRAM(S): at 11:48

## 2024-12-03 RX ADMIN — Medication 325 MILLIGRAM(S): at 11:49

## 2024-12-03 RX ADMIN — ENOXAPARIN SODIUM 40 MILLIGRAM(S): 30 INJECTION SUBCUTANEOUS at 17:41

## 2024-12-03 RX ADMIN — PAROXETINE HYDROCHLORIDE HEMIHYDRATE 30 MILLIGRAM(S): 37.5 TABLET, FILM COATED, EXTENDED RELEASE ORAL at 11:48

## 2024-12-03 NOTE — PROGRESS NOTE ADULT - ASSESSMENT
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  S/p L ICA terminus occulusion a/p L ICA thrombectomy,  w/ underling L petrous ICA dissection     # L MCA stroke w/ hemorrhagic conversion  # L petrous ICA dissection w/ pesudoaneurysm  - CTH 11/9: evolving acute to subacute left MCA infarct with evolving left basal ganglia hemorrhagic transformation, new punctuate foci of increased hyperdensity. Stable mass effect and 5mm rightward midline shift  - Aspirin 325mg daily  - Plavix 75mg daily   - Rosuvastatin 5mg QD  - outpatient cardiology f/u for w/u r/o cardioembolic source  - continue comprehensive rehab program, 3 hours a day, 5 days a week.    Aphasia  - C/w Aricept 5 mg 11/26-- tolerating well    HTN---  --off all BP meds due to [place]    Hypernatremia  --Na 146 yesterday to 145 today    # R Soleal Vein DVT  - Duplex U/S showed DVT below the knee, R soleal vein thrombosis 11/14  - Lovenox 40 mg SubQ daily 11/15  - f/u weekly dopplers, last doppler 11/21 shows Persistent right soleal vein DVT. REPEAT 11/29 performed-- Deep venous thrombosis in a muscular branch to the right soleus, unchanged since 11/14/2024.    #Urinary Retention,--improving  - Voiding--but with PVRs in 200s-300s  --has not needed SC for days  -bowels reg.   - cont. flomax 0.4 (11/18)  - Patient completed macrobid on 11/27 for UTI      # Normocytic anemia (stable) vs Iron deficiency anemia   - F/u outpatient colonoscopy for hx of colon polps  - Transfuse <7 PRN    - Iron studies: Transferrin: 11, TIBC: 170, Iron: 18, Ferritin: 414  - CBC 11/29- HgB 10.8  --H&H improved 12/2 to 11.7/35.5    # RHETT  # Activity-Induced Hypoxia  - CXR- negative   - CPAP HS 5 50% O2. Poor compliance  - O2 via NC PRN  - Albuterol PRN    # Gout  - Colchicine 0.6mg daily  - Uric acid 11/12- 6.8     # Mood/Cognition  # Depression  - Paxil 30mg daily     # Sleep:   -cont. melatonin 9 qhs     # Pain Management:  - Tylenol PRN    # GI/Bowel:  - Senna QHS  - Miralax daily   - Bisacodyl suppository QD PRN  - simethicone for gas pain  - GI ppx: Pantoprazole 40mg daily     # Dysphagia:  - MBS  12/4 is planned - upgraded to regular and thins w/ provale cups or chin tuck  - restorative dining        IDT 12/3  RN - voiding but incontinent  SW - condo with wife, 3-4 arely. elevator to 7th floor.  Independent prior, driving and working.  Wife is retired, main support and she drives. TREV options given to family  SLP - regular diet w/ provale cup or chin tuck, MBSS tomorrow, apraxia mod-severe but continues to imrpove, attempts to verbalize needs more  OT - Setup eating and grooming. Tot-A toileting. Ana UBD Mod-A LBD in supine. toileting and toilet transfers are totalA, tub/shoewr maxA, footwear totalA  PT - Max-A BM. Mod/Max for tx. Amb 30ft with lap gait   Goal 1 - use multimodalities to communicate basic wants and needs with staff max cues- progressing.     Goal 2 - transfer OOB with minimal assist- changed to modA and is progressing  New goal: tx OOB w. mod-A.    Barriers - communication, hemiplegia, sensory impairment, aphasia, poor endurance.    admit GC 11/13.    CMG is 12/5.  DC 12/5 to [facility]

## 2024-12-03 NOTE — PROGRESS NOTE ADULT - ASSESSMENT
Tevin Tapia  [facility] Physician [facility]  [address]  Scheduled Appointment: 07/14/2022      70 y/o M w/ PMHx HLD, depression, GERD, gout, colonic polyps,  congenital absence of one kidney, recent cholecystitis s/p cholecystectomy (6/24), RHETT on CPAP orginially presented to [facility] on 11/3 with aphasia, R sided weakness and R facial droop and vigorous cough x 2 weeks s/p COVID vaccine. 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  S/p L ICA terminus occulusion a/p L ICA thrombectomy, no stent, 5 passes TICI 2C  w/ underling L petrous ICA dissection on 11/3 w/ Dr. March. Now admitted to [facility] for functional deficits for initiation of a multidisciplinary rehab program     #L MCA stroke w/ hemorrhagic conversion  #L petrous ICA dissection w/ pesudoaneurysm  -CTH 11/3: hyperdense appearance of L basal ganglia and L frontal lobe gyri, mild LCA edema 2/2 recent ischemia  -CTA H/N 11/3: no evidence of significant stenosis affecting the extra cranial carotid or vertebral arteries. No evidence of aneurysm or significant vascular stenosis at [place] of [place]  -Repeat CTH 11/4: post recent endovascular revascularization of the left internal carotid artery and left middle cerebral artery. Minimally less conspicuous hyperdense appearance of left basal ganglia and left frontal lobe gyri which may represent expected evolution of contrast staining  -MRI brain 11/5: evolving recent infarct in L ICA associated with hemorrhagic transformation of the L basal ganglia. Effacement of cerebral sulci with mass effect causing 0.6 mm rightward shift.   -CTH 11/9: evolving acute to subacute left MCA infarct with evolving left basal ganglia hemorrhagic transformation, new punctuate foci of increased hyperdensity. Stable mass effect and 5mm rightward midline shift  -CTA 11/9: stable left internal carotid artery pseudoaneurysm immediately inferior to the petrous portion of the L ICA at the area of focal dissection.  -S/p L ICA terminus occulusion a/p L ICA thrombectomy, no stent, 5 passes TICI 2C  w/ underling L petrous ICA dissection on 11/3 w/ Dr. March.   -A1c 5.0; LDL 60  -Aspirin 325mg daily  -Plavix 75mg daily   rosuvastatin 5mg QD  -Will be on DAPT for 3 months and follow up with outpt neurology  -F/u outpatient cardiology for Zio-patch to r/o cardioembolic source --ILR to be considered as outpatient    #Urinary Retention, repeated PVR > 500cc,   - jerome inserted  - continue flomax  - treat constipation aggressively   - Macrobid since 11/20 for UTI ( E. Faecalis ), afebrile, no leukocytosis on labs. Completed 7 day course  - plan for voiding trial in the next few days    #Elevated PSA  - pt will need o/p F/U with Urology      # Normocytic anemia (stable)   -Monitor H/H (12.2/37.2- on 11/14), most recent Hb 11.6  -F/u outpatient colonoscopy for hx of colon polps  -Transfuse <7 PRN    -Iron studies: Transferrin: 11, TIBC: 170, Iron: 18, Ferritin: 414    # Primary HTN   - BP has been stable off (D/Cd Losartan)  - continue to monitor can add back ARB low dose if BP becomes elevated persistently      #RHETT  # Hypoxemia  -CPAP HS  -Albuterol PRN  -On Flovent at home (not available here)  - weaned off Oxygen and saturating well on RA  - pulmonary consult reviewed  - Incentive spirometry    #Gout  -Colchicine 0.6mg daily  -Uric acid 11/12- 6.8     # R Soleal Vein DVT  - Duplex U/S showed DVT below the knee, R soleal vein thrombosis 11/14  - Aspirin 325 mg daily  - Plavix 75 mg daily  - Lovenox 40 mg SubQ daily 11/15  - repeat doppler 11/21 with persistent DVT, no propagation.   --f/u weekly dopplers.     #Depression  -Paxil 30mg daily     # Hypernatremia  - resolved  - continue to monitor    will follow, d/w rehab

## 2024-12-03 NOTE — PROGRESS NOTE ADULT - SUBJECTIVE AND OBJECTIVE BOX
HPI:  68 y/o M w/ PMHx HLD, depression, GERD, gout, colonic polyps,  congenital absence of one kidney, recent cholecystitis s/p cholecystectomy (6/24), RHETT on CPAP orginially presented to [facility] on 11/3 with aphasia, R sided weakness and R facial droop and vigorous cough x 2 weeks s/p COVID vaccine. LKW 2045 on 11/2. 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 Patient transferred to [facility] for mechanical thrombectomy. S/p L ICA terminus occulusion a/p L ICA thrombectomy, no stent, 5 passes TICI 2C  w/ underling L petrous ICA dissection on 11/3 w/ Dr. March. Post op course c/b slight pseudoaneurysm enlargement w/ dissection distal embolisim into the terminal ICA seen on 11/4 CTA and hemorrhagic transformation of L MCA infarct.   Of note, hospital course also significant for fevers, suspect aspiration pneumonitis treated with Rocephin x 1, and dysphagia requiring easy to chew,  with mod thick liquids. EP was consulted with concerns of cardioembolic source for infarct, recommended to f/u outpatient for Zio patch.   Patient had an MBS on 11/12 which showed moderate oropharyngeal dysphagia, remarkable for delayed/reduced laryngeal vestibular closure, pharyngeal trigger delay and reduced hyolaryngeal elevation/excursion.  There was deep laryngeal penetration and possibly aspiration of thin liquid.  Patient was cleared for easy to chew GI diet with mildly thick liquids.    Patient optimized and was evaluated by PM&R and therapy for functional deficits, gait/ADL impairments and acute rehabilitation was recommended. Patient was cleared for discharge to [facility] IRU on 11/13/24.  (13 Nov 2024 15:44)          Subjective:  Seen in wheelchair at breakfast this morning.  Pt. is doing well.  Tolerating therapy.  Voiding w/o issue.  Denies pain.  Slept during the night.  Pt. frustrated by aphasia.  List of TREV locations provided to wife and patient is aware of discharge planning for Thursday.       Vital Signs Last 24 Hrs  T(C): 36.3 (03 Dec 2024 07:35), Max: 36.7 (02 Dec 2024 19:50)  T(F): 97.3 (03 Dec 2024 07:35), Max: 98.1 (02 Dec 2024 19:50)  HR: 61 (03 Dec 2024 07:35) (58 - 61)  BP: 134/86 (03 Dec 2024 07:35) (116/70 - 134/86)  BP(mean): --  RR: 16 (03 Dec 2024 07:35) (16 - 16)  SpO2: 97% (03 Dec 2024 07:35) (97% - 98%)    Parameters below as of 03 Dec 2024 07:35  Patient On (Oxygen Delivery Method): room air        REVIEW OF SYMPTOMS  Neurological deficits      PHYSICAL EXAM  Constitutional - NAD, Comfortable   HEENT - NCAT, EOMI   Chest - good chest expansion, good respiratory effort,   Cardio - warm and well perfused,   Abdomen -  Soft, NTND   Extremities - No peripheral edema, No calf tenderness      Neurologic Exam:                       Cognitive -              Orientation: Awake, Alert, Orientation limited due to aphasia            Attention:  limited due to aphasia; can follow simple  commands             Memory: unable to fully test due to aphasia     Speech – + Aphasia--Nonfluent, Impaired multiple step command following, but able to follow all simple commands, impaired repetition; + dysarthria,  able to verbalize some words.       Cranial Nerves – PERRLA , EOMI, + left facial weakness; facial sensation impaired on right, + dysarthria, + dysphagia, Shoulder shrug impaired        Motor -                        LEFT    UE - ShAB 5/5, EF 5/5, EE 5/5, WE 5/5,  5/5                     RIGHT UE - ShAB 0/5, EF 0/5, EE 0/5, WE 0/5,  0/5                     LEFT    LE - HF 5/5, KE 5/5, DF 5/5, PF 5/5                     RIGHT LE - HF 0/5, KE 0/5, DF 0/5, PF 0/5        Coordination - FTN/HTS intact on the left; unable to assess due to weaknes son the right     Sensory – Impaired to LT  on right side;--     Psychiatric - Mood stable, Affect WNL     RECENT LABS                        11.7   4.44  )-----------( 182      ( 02 Dec 2024 05:28 )             35.5       12-03    145  |  107  |  14  ----------------------------<  91  3.9   |  33[H]  |  1.02    Ca    8.9      03 Dec 2024 05:33    TPro  6.0  /  Alb  2.6[L]  /  TBili  0.4  /  DBili  x   /  AST  22  /  ALT  23  /  AlkPhos  64  12-02      Urinalysis Basic - ( 02 Dec 2024 05:28 )    Color: x / Appearance: x / SG: x / pH: x  Gluc: 103 mg/dL / Ketone: x  / Bili: x / Urobili: x   Blood: x / Protein: x / Nitrite: x   Leuk Esterase: x / RBC: x / WBC x   Sq Epi: x / Non Sq Epi: x / Bacteria: x          RADIOLOGY/OTHER RESULTS      MEDICATIONS  (STANDING):  aspirin 325 milliGRAM(s) Oral daily  cholecalciferol 1000 Unit(s) Oral daily  clopidogrel Tablet 75 milliGRAM(s) Oral daily  colchicine 0.6 milliGRAM(s) Oral daily  donepezil 5 milliGRAM(s) Oral with breakfast  enoxaparin Injectable 40 milliGRAM(s) SubCutaneous every 24 hours  melatonin 9 milliGRAM(s) Oral at bedtime  pantoprazole    Tablet 40 milliGRAM(s) Oral before breakfast  PARoxetine 30 milliGRAM(s) Oral daily  rosuvastatin 5 milliGRAM(s) Oral daily  tamsulosin 0.4 milliGRAM(s) Oral at bedtime  tiZANidine 2 milliGRAM(s) Oral at bedtime    MEDICATIONS  (PRN):  acetaminophen     Tablet .. 650 milliGRAM(s) Oral every 6 hours PRN Mild Pain (1 - 3)  albuterol    90 MICROgram(s) HFA Inhaler 2 Puff(s) Inhalation every 6 hours PRN Bronchospasm  atropine 1% Ophthalmic Solution for SubLingual Use 1 Drop(s) SubLingual every 8 hours PRN cough, and increased upper airway secretions  bisacodyl Suppository 10 milliGRAM(s) Rectal daily PRN Constipation  polyethylene glycol 3350 17 Gram(s) Oral daily PRN Constipation  senna 2 Tablet(s) Oral at bedtime PRN Constipation  simethicone 80 milliGRAM(s) Chew four times a day PRN Dyspepsia

## 2024-12-03 NOTE — PROGRESS NOTE ADULT - TIME BILLING
Time spent includes direct patient care  (interview and examination of patient), discussion with other providers, support staff and/or patient's family members, review of medical records, ordering diagnostic tests and analyzing results, and documentation, excluding time spent in ACP discussions.
Time spent includes direct patient care  (interview and examination of patient), discussion with other providers, support staff and/or patient's family members, review of medical records, ordering diagnostic tests and analyzing results, and documentation.
Time spent includes direct patient care  (interview and examination of patient), discussion with other providers, support staff and/or patient's family members, review of medical records, ordering diagnostic tests and analyzing results, and documentation.
Time spent includes direct patient care  (interview and examination of patient), discussion with other providers, support staff and/or patient's family members, review of medical records, ordering diagnostic tests and analyzing results, and documentation, excluding time spent in ACP discussions.
This includes reviewing results/imaging and discussions with specialists, nursing, case management/social work. Further tests, medications, and procedures have been ordered as indicated. Results and the plan of care were communicated to the patient and/or their family member. Supporting documentation was completed and added to the patient's chart.
Time spent includes direct patient care  (interview and examination of patient), discussion with other providers, support staff and/or patient's family members, review of medical records, ordering diagnostic tests and analyzing results, and documentation.
Time spent includes direct patient care  (interview and examination of patient), discussion with other providers, support staff and/or patient's family members, review of medical records, ordering diagnostic tests and analyzing results, and documentation.
This includes reviewing results/imaging and discussions with specialists, nursing, case management/social work. Further tests, medications, and procedures have been ordered as indicated. Results and the plan of care were communicated to the patient and/or their family member. Supporting documentation was completed and added to the patient's chart.
Review and preparation to see patient, examination and assessment of patient, obtaining nursing subjective report as pt. confused,  Discussion of managment with following consultants: Hospitalist, ....  ,  Discussion of rehabilitation plan of care during huddle meeting with SW, Speech, OT, PT and nursing.  Review of imaging...  Discussion with family, ...., to update on patient status, rehab plan of care, progress in therapy, general prognosis, ELOS, discharge planning.
Review and preparation to see patient, examination and assessment of patient, obtaining nursing subjective report as pt. confused,  Discussion of management with following consultants: Hospitalist, ....  ,  Discussion of rehabilitation plan of care during huddle meeting with SW, therapy and nursing.  Review of imaging..  Discussion with family, .Pt's wife, Elaine..., to update on patient status, rehab plan of care, rehab plan of care, general prognosis.

## 2024-12-03 NOTE — PROGRESS NOTE ADULT - SUBJECTIVE AND OBJECTIVE BOX
Patient is a 69y old  Male who presents with a chief complaint of L MCA infarct w/ hemorrhagic conversion (01 Dec 2024 08:52)    Patient examined and examined. No new complaints. No acute evnts overnight.    ALLERGIES:  penicillin (Swelling)    MEDICATIONS  (STANDING):  aspirin 325 milliGRAM(s) Oral daily  cholecalciferol 1000 Unit(s) Oral daily  clopidogrel Tablet 75 milliGRAM(s) Oral daily  colchicine 0.6 milliGRAM(s) Oral daily  donepezil 5 milliGRAM(s) Oral with breakfast  enoxaparin Injectable 40 milliGRAM(s) SubCutaneous every 24 hours  melatonin 9 milliGRAM(s) Oral at bedtime  pantoprazole    Tablet 40 milliGRAM(s) Oral before breakfast  PARoxetine 30 milliGRAM(s) Oral daily  rosuvastatin 5 milliGRAM(s) Oral daily  tamsulosin 0.4 milliGRAM(s) Oral at bedtime  tiZANidine 2 milliGRAM(s) Oral at bedtime    MEDICATIONS  (PRN):  acetaminophen     Tablet .. 650 milliGRAM(s) Oral every 6 hours PRN Mild Pain (1 - 3)  albuterol    90 MICROgram(s) HFA Inhaler 2 Puff(s) Inhalation every 6 hours PRN Bronchospasm  atropine 1% Ophthalmic Solution for SubLingual Use 1 Drop(s) SubLingual every 8 hours PRN cough, and increased upper airway secretions  bisacodyl Suppository 10 milliGRAM(s) Rectal daily PRN Constipation  polyethylene glycol 3350 17 Gram(s) Oral daily PRN Constipation  senna 2 Tablet(s) Oral at bedtime PRN Constipation  simethicone 80 milliGRAM(s) Chew four times a day PRN Dyspepsia    T(C): 36.3 (12-03-24 @ 07:35), Max: 36.7 (12-02-24 @ 19:50)  T(F): 97.3 (12-03-24 @ 07:35), Max: 98.1 (12-02-24 @ 19:50)  HR: 61 (12-03-24 @ 07:35) (58 - 61)  BP: 134/86 (12-03-24 @ 07:35) (116/70 - 134/86)  ABP: --  ABP(mean): --  RR: 16 (12-03-24 @ 07:35) (16 - 16)  SpO2: 97% (12-03-24 @ 07:35) (97% - 98%)    PHYSICAL EXAM:  General: NAD, alert   ENT: MMM  Neck: Supple, No JVD  Lungs: Clear to auscultation bilaterally  Cardio: RRR, S1/S2, No murmurs  Abdomen: Soft, Nontender, Nondistended; Bowel sounds present  Extremities: No calf tenderness, No pitting edema  Neuro expressive aphasia rt hP no new deficits    LABS:                        11.7   4.44  )-----------( 182      ( 02 Dec 2024 05:28 )             35.5     12-02    146[H]  |  109[H]  |  10  ----------------------------<  103[H]  3.8   |  32[H]  |  0.90    Ca    9.0      02 Dec 2024 05:28    TPro  6.0  /  Alb  2.6[L]  /  TBili  0.4  /  DBili  x   /  AST  22  /  ALT  23  /  AlkPhos  64  12-02      Urinalysis Basic - ( 02 Dec 2024 05:28 )    Color: x / Appearance: x / SG: x / pH: x  Gluc: 103 mg/dL / Ketone: x  / Bili: x / Urobili: x   Blood: x / Protein: x / Nitrite: x   Leuk Esterase: x / RBC: x / WBC x   Sq Epi: x / Non Sq Epi: x / Bacteria: x       CAPILLARY BLOOD GLUCOSE            Urinalysis Basic - ( 02 Dec 2024 05:28 )    Color: x / Appearance: x / SG: x / pH: x  Gluc: 103 mg/dL / Ketone: x  / Bili: x / Urobili: x   Blood: x / Protein: x / Nitrite: x   Leuk Esterase: x / RBC: x / WBC x   Sq Epi: x / Non Sq Epi: x / Bacteria: x        RADIOLOGY & ADDITIONAL TESTS:    Consultant(s) Notes Reviewed:  [x ] YES  [ ] NO  Care Discussed with Consultants/Other Providers [ x] YES  [ ] NO  Imaging Personally Reviewed:  [x ] YES  [ ] NO

## 2024-12-04 ENCOUNTER — TRANSCRIPTION ENCOUNTER (OUTPATIENT)
Age: 69
End: 2024-12-04

## 2024-12-04 PROCEDURE — 99232 SBSQ HOSP IP/OBS MODERATE 35: CPT

## 2024-12-04 PROCEDURE — 74230 X-RAY XM SWLNG FUNCJ C+: CPT | Mod: 26

## 2024-12-04 RX ORDER — MAGNESIUM, ALUMINUM HYDROXIDE 200-225/5
30 SUSPENSION, ORAL (FINAL DOSE FORM) ORAL EVERY 6 HOURS
Refills: 0 | Status: DISCONTINUED | OUTPATIENT
Start: 2024-12-04 | End: 2024-12-05

## 2024-12-04 RX ADMIN — ROSUVASTATIN CALCIUM 5 MILLIGRAM(S): 5 TABLET, FILM COATED ORAL at 12:02

## 2024-12-04 RX ADMIN — Medication 1000 UNIT(S): at 12:01

## 2024-12-04 RX ADMIN — ACETAMINOPHEN, DIPHENHYDRAMINE HCL, PHENYLEPHRINE HCL 9 MILLIGRAM(S): 325; 25; 5 TABLET ORAL at 22:24

## 2024-12-04 RX ADMIN — TIZANIDINE 2 MILLIGRAM(S): 4 TABLET ORAL at 22:24

## 2024-12-04 RX ADMIN — Medication 30 MILLILITER(S): at 20:38

## 2024-12-04 RX ADMIN — PANTOPRAZOLE SODIUM 40 MILLIGRAM(S): 40 TABLET, DELAYED RELEASE ORAL at 05:38

## 2024-12-04 RX ADMIN — CLOPIDOGREL 75 MILLIGRAM(S): 75 TABLET, FILM COATED ORAL at 12:02

## 2024-12-04 RX ADMIN — ENOXAPARIN SODIUM 40 MILLIGRAM(S): 30 INJECTION SUBCUTANEOUS at 17:16

## 2024-12-04 RX ADMIN — PAROXETINE HYDROCHLORIDE HEMIHYDRATE 30 MILLIGRAM(S): 37.5 TABLET, FILM COATED, EXTENDED RELEASE ORAL at 12:01

## 2024-12-04 RX ADMIN — TAMSULOSIN HYDROCHLORIDE 0.4 MILLIGRAM(S): 0.4 CAPSULE ORAL at 22:26

## 2024-12-04 RX ADMIN — Medication 325 MILLIGRAM(S): at 12:01

## 2024-12-04 RX ADMIN — DONEPEZIL HYDROCHLORIDE 5 MILLIGRAM(S): 5 TABLET, FILM COATED ORAL at 08:05

## 2024-12-04 RX ADMIN — Medication 0.6 MILLIGRAM(S): at 12:02

## 2024-12-04 RX ADMIN — Medication 80 MILLIGRAM(S): at 19:31

## 2024-12-04 NOTE — CHART NOTE - NSCHARTNOTEFT_GEN_A_CORE
Nutrition Follow Up Note  Source: Medical Record [X] Patient [X] Family [X] pt's wife         Diet: Regular, pesco-vegetarian, [employer] standard BID (provides 650 kcal, 32 g protein)  Pt tolerating diet with fair-good PO intake, eating % of meals Per nursing flowsheets. PO intake appears to be improving since diet upgrade per SLP recommendation. Pt's wife requested information on gout nutrition therapy. Provided with written and verbal nutrition education on low purine diet. No digestive issues reported at this time.    Enteral/Parenteral Nutrition: N/A    Pertinent Medications: MEDICATIONS  (STANDING):  albuterol    90 MICROgram(s) HFA Inhaler 2 Puff(s) Inhalation every 6 hours  aspirin 325 milliGRAM(s) Oral daily  cholecalciferol 1000 Unit(s) Oral daily  clopidogrel Tablet 75 milliGRAM(s) Oral daily  colchicine 0.6 milliGRAM(s) Oral daily  donepezil 5 milliGRAM(s) Oral with breakfast  enoxaparin Injectable 40 milliGRAM(s) SubCutaneous every 24 hours  melatonin 9 milliGRAM(s) Oral at bedtime  pantoprazole    Tablet 40 milliGRAM(s) Oral before breakfast  PARoxetine 30 milliGRAM(s) Oral daily  polyethylene glycol 3350 17 Gram(s) Oral daily  rosuvastatin 5 milliGRAM(s) Oral daily  senna 2 Tablet(s) Oral at bedtime  tamsulosin 0.4 milliGRAM(s) Oral at bedtime    MEDICATIONS  (PRN):  acetaminophen     Tablet .. 650 milliGRAM(s) Oral every 6 hours PRN Mild Pain (1 - 3)  atropine 1% Ophthalmic Solution for SubLingual Use 1 Drop(s) SubLingual every 8 hours PRN cough, and increased upper airway secretions  bisacodyl Suppository 10 milliGRAM(s) Rectal daily PRN Constipation  simethicone 80 milliGRAM(s) Chew four times a day PRN Dyspepsia      Pertinent Labs:  11-25 Na142 mmol/L Glu 102 mg/dL[H] K+ 4.0 mmol/L Cr  0.95 mg/dL BUN 15 mg/dL 11-25 Alb 2.6 g/dL[L]        Skin:  Skin intact per nursing flow sheets     Edema: RLE DVT    Last BM: on 11-27 Per nursing flowsheets     Estimated Needs:   [X] No Change since Previous Assessment  [ ] Recalculated:     Previous Nutrition Diagnosis:   Swallowing difficulty    Nutrition Diagnosis is   [X] Resolved - pt on a regular diet per SLP recommendation       New Nutrition Diagnosis: [X] Not Applicable  [ ] Inadequate Protein Energy Intake   [ ] Inadequate Oral Intake   [ ] Excessive Energy Intake   [ ] Increased Nutrient Needs   [ ] Obesity   [ ] Altered GI Function   [ ] Unintended Weight Loss   [ ] Food & Nutrition Related Knowledge Deficit  [ ] Limited Adherence to nutrition related recommendations   [ ] Malnutrition      Interventions:   1. Recommend continuing with current plan of care  2. Encourage PO intake  3. Obtain and honor food preferences as able  4. Diet education    Monitoring & Evaluation:   [X] Weights   [X] PO Intake   [X] Follow Up (Per Protocol)  [X] Tolerance to Diet Prescription   [X] Other: Labs    RD Remains Available.  Sherrie Velez RD
Resident physician able to speak with patient's wife Elaine Jamil ([phone]) at approximately 1630 to provide update after IDT today. Resident able to give brief summary of team discussion. Wife was able to confirm discharge to [facility] on 12/5 and has selected [facility] in [place]. Patient's wife asked about patient's speech progression. Able to update that patient is currently on regular diet and thin liquids use provale cup and chin tuck technique. Patient to receive MBSS tomorrow to see if diet can be upgraded prior to discharge. 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 Assured patient that with continued therapy and time, patient would be expected to continue to improve. If any changes in patient's status or discharge plans, resident physician informed patient's wife that they will be sure to reach back out to her.    Virgil Wong MD  PMR PGY3  [phone]
NUTRITION Follow Up Note    Pt with adequate appetite/intake at this time. Pt consuming ~% of meals provided. No issues chewing/swallowing reported. No N/V/D/C reported. LBM 12/03.     SOURCE: Patient [X]  Medical Record [X]  Nursing Staff [X]  Family Member []    DIET: Diet, Regular:   [employer] Vegetarian (Accepts Fish)  Supplement Feeding Modality:  Oral  Ensure Plant-Based Cans or Servings Per Day:  1       Frequency:  Two Times a day (11-28-24 @ 07:42) [Active]          EDEMA: 2+ right foot       SKIN: No Pressure Ulcers     WEIGHT TRENDS: 224.8 11/13      PERTINENT MEDICATIONS: MEDICATIONS  (STANDING):  aspirin 325 milliGRAM(s) Oral daily  cholecalciferol 1000 Unit(s) Oral daily  clopidogrel Tablet 75 milliGRAM(s) Oral daily  colchicine 0.6 milliGRAM(s) Oral daily  donepezil 5 milliGRAM(s) Oral with breakfast  enoxaparin Injectable 40 milliGRAM(s) SubCutaneous every 24 hours  melatonin 9 milliGRAM(s) Oral at bedtime  pantoprazole    Tablet 40 milliGRAM(s) Oral before breakfast  PARoxetine 30 milliGRAM(s) Oral daily  rosuvastatin 5 milliGRAM(s) Oral daily  tamsulosin 0.4 milliGRAM(s) Oral at bedtime  tiZANidine 2 milliGRAM(s) Oral at bedtime    MEDICATIONS  (PRN):  acetaminophen     Tablet .. 650 milliGRAM(s) Oral every 6 hours PRN Mild Pain (1 - 3)  albuterol    90 MICROgram(s) HFA Inhaler 2 Puff(s) Inhalation every 6 hours PRN Bronchospasm  atropine 1% Ophthalmic Solution for SubLingual Use 1 Drop(s) SubLingual every 8 hours PRN cough, and increased upper airway secretions  bisacodyl Suppository 10 milliGRAM(s) Rectal daily PRN Constipation  polyethylene glycol 3350 17 Gram(s) Oral daily PRN Constipation  senna 2 Tablet(s) Oral at bedtime PRN Constipation  simethicone 80 milliGRAM(s) Chew four times a day PRN Dyspepsia      PERTINENT LABS:  12-03 Na145 mmol/L Glu 91 mg/dL K+ 3.9 mmol/L Cr  1.02 mg/dL BUN 14 mg/dL 12-02 Alb 2.6 g/dL[L]        ESTIMATED NEEDS:   [X] No Change Since Previous Assessment    PREVIOUS NUTRITION DIAGNOSIS: swallowing difficulty       NUTRITION DIAGNOSIS IS [X]     [X] Resolved - pt currently on regular diet + thin liquids         NEW NUTRITION DIAGNOSIS: [X] Not Applicable    INTERVENTIONS:   1. Continue Current Nutrition Care Regimen at This Time.     MONITORING & EVALUATION:   1. Weights   2. PO intakes   3. Skin integrity   4. Tolerance to diet prescription   5. Labs & POCT  6. Follow up (per protocol)    Registered Dietitian/Nutritionist Remains Available.  Frederic Layne RD    Contact: [phone] or via MS TEAMS
NUTRITION Follow Up Note    RD visited pt to discuss nutrition preferences due to lack of po intakes. Pt wife bedside. Pt wife endorses pt as pescatarian - accepts eggs. Reviewed menu with pt and ordering protocols as well as plant based options and daily alternatives. Obtained pt preferences and will honor as medically feasible.     Registered Dietitian/Nutritionist Remains Available.  Frederic Layne RD    Contact: [phone] or via MS TEAMS
Nutrition Follow Up Note  Hospital Course (Per Electronic Medical Record):   Source: Medical Record [X] Nursing Staff [X]     Diet: [place] to Chew , Mild thickened ,[employer] Vegetarian  Ensure plant based BID     Patient noted tolerating diet noted consuming between 50-75% as per nursing , Patient aphasic , menu preferences obtained from spouse at admission , Patient is receiving [employer] Standard BID which is providing an additional 650kcals, 32gms protein , Labs reviewed from (11/18) , Current diet appears appropriate as per SLP , continue current nutrition regimen     Current Weight: no follow up weight noted     Pertinent Medications: MEDICATIONS  (STANDING):  albuterol    90 MICROgram(s) HFA Inhaler 2 Puff(s) Inhalation every 6 hours  amLODIPine   Tablet 5 milliGRAM(s) Oral daily  aspirin 325 milliGRAM(s) Oral daily  cholecalciferol 1000 Unit(s) Oral daily  clopidogrel Tablet 75 milliGRAM(s) Oral daily  colchicine 0.6 milliGRAM(s) Oral daily  enoxaparin Injectable 40 milliGRAM(s) SubCutaneous every 24 hours  losartan 50 milliGRAM(s) Oral daily  melatonin 9 milliGRAM(s) Oral at bedtime  nitrofurantoin monohydrate/macrocrystals (MACROBID) 100 milliGRAM(s) Oral two times a day  pantoprazole    Tablet 40 milliGRAM(s) Oral before breakfast  PARoxetine 30 milliGRAM(s) Oral daily  polyethylene glycol 3350 17 Gram(s) Oral two times a day  rosuvastatin 5 milliGRAM(s) Oral daily  senna 2 Tablet(s) Oral at bedtime  tamsulosin 0.4 milliGRAM(s) Oral at bedtime    MEDICATIONS  (PRN):  acetaminophen     Tablet .. 650 milliGRAM(s) Oral every 6 hours PRN Mild Pain (1 - 3)  atropine 1% Ophthalmic Solution for SubLingual Use 1 Drop(s) SubLingual every 8 hours PRN cough, and increased upper airway secretions  bisacodyl Suppository 10 milliGRAM(s) Rectal daily PRN Constipation      Pertinent Labs:  11-18 Na143 mmol/L Glu 101 mg/dL[H] K+ 3.8 mmol/L Cr  0.94 mg/dL BUN 21 mg/dL 11-18 Alb 2.7 g/dL[L]  Hgb 12.0g/dl,<L>  Hct36.2% <L>       Skin: ecchymotic     Edema: none    Last BM: (11/20)     Estimated Needs:   [X] No Change since Previous Assessment      Previous Nutrition Diagnosis: Swallowing Difficulty    Nutrition Diagnosis is [X] Ongoing, altered consistency diet noted         New Nutrition Diagnosis: [X] Not Applicable      Interventions:   1. continue current diet   2 provide patient's preferences to enhance po intake       Monitoring & Evaluation: will monitor:  [X] Weights   [X] PO Intake   [X] Follow Up (Per Protocol)  [X] Tolerance to Diet Prescription       RD to follow as per Nutrition protocol  Delmy Colón RDN

## 2024-12-04 NOTE — DISCHARGE NOTE NURSING/CASE MANAGEMENT/SOCIAL WORK - PATIENT PORTAL LINK FT
You can access the FollowMyHealth Patient Portal offered by [facility] by registering at the following website: [email or web address]. By joining [facility]’s FollowMyHealth portal, you will also be able to view your health information using other applications (apps) compatible with our system.

## 2024-12-04 NOTE — PROGRESS NOTE ADULT - ASSESSMENT
70 y/o M w/ PMHx HLD, depression, GERD, gout, colonic polyps,  congenital absence of one kidney, recent cholecystitis s/p cholecystectomy (6/24), RHETT on CPAP orginially presented to [facility] on 11/3/24 with 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  S/p L ICA terminus occulusion a/p L ICA thrombectomy,  w/ underling L petrous ICA dissection     # L MCA stroke w/ hemorrhagic conversion  # L petrous ICA dissection w/ pesudoaneurysm  - CTH 11/9: evolving acute to subacute left MCA infarct with evolving left basal ganglia hemorrhagic transformation, new punctuate foci of increased hyperdensity. Stable mass effect and 5mm rightward midline shift  - Aspirin 325mg daily  - Plavix 75mg daily   - Rosuvastatin 5mg QD  - outpatient cardiology f/u for w/u r/o cardioembolic source  - continue comprehensive rehab program, 3 hours a day, 5 days a week.    Aphasia  - Trial increasing Aricept to 10 mg 12/5 AM  --monitor for SEs    HTN---  --off all BP meds due to OH    Hypernatremia  --Na 146 12/2 to 145 on 12/3  --cont. to monitor  --f/u CMP tomorrow    # R Soleal Vein DVT  - Duplex U/S showed DVT below the knee, R soleal vein thrombosis 11/14  - Lovenox 40 mg SubQ daily 11/15  - f/u weekly dopplers, last doppler 11/21 shows Persistent right soleal vein DVT. REPEAT 11/29 performed-- Deep venous thrombosis in a muscular branch to the right soleus, unchanged since 11/14/2024.    #Urinary Retention,--Resolved  - Voiding--with low PVRs <100cc  --has not needed SC for days  -bowels reg.   - cont. flomax 0.4 (11/18)      # Normocytic anemia (stable) vs Iron deficiency anemia   - F/u outpatient colonoscopy for hx of colon polps  - Transfuse <7 PRN    - Iron studies: Transferrin: 11, TIBC: 170, Iron: 18, Ferritin: 414  - CBC 11/29- HgB 10.8  --H&H improved 12/2 to 11.7/35.5  --f/u labs tomorrow    # RHETT  # Activity-Induced Hypoxia  - CXR- negative   - CPAP HS 5 50% O2. Poor compliance  - O2 via NC PRN  - Albuterol PRN    # Gout  - Colchicine 0.6mg daily  - Uric acid 11/12- 6.8     # Mood/Cognition  # Depression  - Paxil 30mg daily     # Sleep:   -cont. melatonin 9 qhs     # Pain Management:  - Tylenol PRN    # GI/Bowel:  - Senna QHS  - Miralax daily   - Bisacodyl suppository QD PRN  - simethicone for gas pain  - GI ppx: Pantoprazole 40mg daily     # Dysphagia:  - MBS  12/4 is planned - upgraded to regular and thins w/ provale cups or chin tuck  - restorative dining        IDT 12/3  RN - voiding but incontinent  SW - condo with wife, 3-4 arely. elevator to 7th floor.  Independent prior, driving and working.  Wife is retired, main support and she drives. TREV options given to family  SLP - regular diet w/ provale cup or chin tuck, MBSS tomorrow, apraxia mod-severe but continues to imrpove, attempts to verbalize needs more  OT - Setup eating and grooming. Tot-A toileting. Ana UBD Mod-A LBD in supine. toileting and toilet transfers are totalA, tub/shoewr maxA, footwear totalA  PT - Max-A BM. Mod/Max for tx. Amb 30ft with lap gait   Goal 1 - use multimodalities to communicate basic wants and needs with staff max cues- progressing.     Goal 2 - transfer OOB with minimal assist- changed to modA and is progressing  New goal: tx OOB w. mod-A.    Barriers - communication, hemiplegia, sensory impairment, aphasia, poor endurance.    admit GC 11/13.    CMG is 12/5.  DC 12/5 to TREV             68 y/o M w/ PMHx HLD, depression, GERD, gout, colonic polyps,  congenital absence of one kidney, recent cholecystitis s/p cholecystectomy (6/24), RHETT on CPAP orginially presented to [facility] on 11/3/24 with 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  S/p L ICA terminus occulusion a/p L ICA thrombectomy,  w/ underling L petrous ICA dissection     # L MCA stroke w/ hemorrhagic conversion  # L petrous ICA dissection w/ pesudoaneurysm  - CTH 11/9: evolving acute to subacute left MCA infarct with evolving left basal ganglia hemorrhagic transformation, new punctuate foci of increased hyperdensity. Stable mass effect and 5mm rightward midline shift  - Aspirin 325mg daily  - Plavix 75mg daily   - Rosuvastatin 5mg QD  - outpatient cardiology f/u for w/u r/o cardioembolic source  - continue comprehensive rehab program, 3 hours a day, 5 days a week.    Aphasia  - cont. Aricept 5mg qhs    HTN---  --off all BP meds due to OH    Hypernatremia  --Na 146 12/2 to 145 on 12/3  --cont. to monitor  --f/u CMP tomorrow    # R Soleal Vein DVT  - Duplex U/S showed DVT below the knee, R soleal vein thrombosis 11/14  - Lovenox 40 mg SubQ daily 11/15  - f/u weekly dopplers, last doppler 11/21 shows Persistent right soleal vein DVT. REPEAT 11/29 performed-- Deep venous thrombosis in a muscular branch to the right soleus, unchanged since 11/14/2024.    #Urinary Retention,--Resolved  - Voiding--with low PVRs <100cc  --has not needed SC for days  -bowels reg.   - cont. flomax 0.4 (11/18)      # Normocytic anemia (stable) vs Iron deficiency anemia   - F/u outpatient colonoscopy for hx of colon polps  - Transfuse <7 PRN    - Iron studies: Transferrin: 11, TIBC: 170, Iron: 18, Ferritin: 414  - CBC 11/29- HgB 10.8  --H&H improved 12/2 to 11.7/35.5  --f/u labs tomorrow    # RHETT  # Activity-Induced Hypoxia  - CXR- negative   - CPAP HS 5 50% O2. Poor compliance  - O2 via NC PRN  - Albuterol PRN    # Gout  - Colchicine 0.6mg daily  - Uric acid 11/12- 6.8     # Mood/Cognition  # Depression  - Paxil 30mg daily     # Sleep:   -cont. melatonin 9 qhs     # Pain Management:  - Tylenol PRN    # GI/Bowel:  - Senna QHS  - Miralax daily   - Bisacodyl suppository QD PRN  - simethicone for gas pain  - GI ppx: Pantoprazole 40mg daily     # Dysphagia:  - MBS  12/4 is planned - upgraded to regular and thins w/ provale cups or chin tuck  - restorative dining        IDT 12/3  RN - voiding but incontinent  SW - condo with wife, 3-4 arely. elevator to 7th floor.  Independent prior, driving and working.  Wife is retired, main support and she drives. TREV options given to family  SLP - regular diet w/ provale cup or chin tuck, MBSS tomorrow, apraxia mod-severe but continues to imrpove, attempts to verbalize needs more  OT - Setup eating and grooming. Tot-A toileting. Ana UBD Mod-A LBD in supine. toileting and toilet transfers are totalA, tub/shoewr maxA, footwear totalA  PT - Max-A BM. Mod/Max for tx. Amb 30ft with lap gait   Goal 1 - use multimodalities to communicate basic wants and needs with staff max cues- progressing.     Goal 2 - transfer OOB with minimal assist- changed to modA and is progressing  New goal: tx OOB w. mod-A.    Barriers - communication, hemiplegia, sensory impairment, aphasia, poor endurance.    admit GC 11/13.    CMG is 12/5.  DC 12/5 to TREV

## 2024-12-04 NOTE — PROGRESS NOTE ADULT - SUBJECTIVE AND OBJECTIVE BOX
HPI:  70 y/o M w/ PMHx HLD, depression, GERD, gout, colonic polyps,  congenital absence of one kidney, recent cholecystitis s/p cholecystectomy (6/24), RHETT on CPAP orginially presented to [facility] on 11/3 with aphasia, R sided weakness and R facial droop and vigorous cough x 2 weeks s/p COVID vaccine. LKW 2045 on 11/2. 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 Patient transferred to [facility] for mechanical thrombectomy. S/p L ICA terminus occulusion a/p L ICA thrombectomy, no stent, 5 passes TICI 2C  w/ underling L petrous ICA dissection on 11/3 w/ Dr. March. Post op course c/b slight pseudoaneurysm enlargement w/ dissection distal embolisim into the terminal ICA seen on 11/4 CTA and hemorrhagic transformation of L MCA infarct.   Of note, hospital course also significant for fevers, suspect aspiration pneumonitis treated with Rocephin x 1, and dysphagia requiring easy to chew,  with mod thick liquids. EP was consulted with concerns of cardioembolic source for infarct, recommended to f/u outpatient for Zio patch.   Patient had an MBS on 11/12 which showed moderate oropharyngeal dysphagia, remarkable for delayed/reduced laryngeal vestibular closure, pharyngeal trigger delay and reduced hyolaryngeal elevation/excursion.  There was deep laryngeal penetration and possibly aspiration of thin liquid.  Patient was cleared for easy to chew GI diet with mildly thick liquids.    Patient optimized and was evaluated by PM&R and therapy for functional deficits, gait/ADL impairments and acute rehabilitation was recommended. Patient was cleared for discharge to [facility] on 11/13/24.  (13 Nov 2024 15:44)          Subjective:  Seen this AM.  Pt. slept during the night, denies pain.  No new complaints.  Tolerating therapy and feels he is making progress.  Had MBS this AM.  Upgraded to thin liquids with cup sips-- no need for chin tuck or Provale cup      VITALS  Vital Signs Last 24 Hrs  T(C): 36.5 (04 Dec 2024 08:05), Max: 36.8 (03 Dec 2024 21:25)  T(F): 97.7 (04 Dec 2024 08:05), Max: 98.3 (03 Dec 2024 21:25)  HR: 56 (04 Dec 2024 08:05) (56 - 58)  BP: 132/80 (04 Dec 2024 08:05) (122/71 - 132/80)  BP(mean): --  RR: 16 (04 Dec 2024 08:05) (16 - 16)  SpO2: 97% (04 Dec 2024 08:05) (95% - 97%)    Parameters below as of 04 Dec 2024 08:05  Patient On (Oxygen Delivery Method): room air        REVIEW OF SYMPTOMS  Neurological deficits      PHYSICAL EXAM  Constitutional - NAD, Comfortable   HEENT - NCAT, EOMI   Chest - good chest expansion, good respiratory effort,   Cardio - warm and well perfused,   Abdomen -  Soft, NTND   Extremities - No peripheral edema, No calf tenderness      Neurologic Exam:                       Cognitive -              Orientation: Awake, Alert, Orientation limited due to aphasia            Attention:  limited due to aphasia; can follow simple  commands             Memory: unable to fully test due to aphasia     Speech – + Aphasia--Nonfluent, Impaired multiple step command following, but able to follow all simple commands, impaired repetition; + dysarthria,  able to verbalize some words.       Cranial Nerves – PERRLA , EOMI, + left facial weakness; facial sensation impaired on right, + dysarthria, + dysphagia, Shoulder shrug impaired        Motor -                        LEFT    UE - ShAB 5/5, EF 5/5, EE 5/5, WE 5/5,  5/5                     RIGHT UE - ShAB 0/5, EF 0/5, EE 0/5, WE 0/5,  0/5                     LEFT    LE - HF 5/5, KE 5/5, DF 5/5, PF 5/5                     RIGHT LE - HF 0/5, KE 0/5, DF 0/5, PF 0/5        Coordination - FTN/HTS intact on the left; unable to assess due to weaknes son the right     Sensory – Impaired to LT  on right side;--     Psychiatric - Mood stable, Affect WNL     RECENT LABS    12-03    145  |  107  |  14  ----------------------------<  91  3.9   |  33[H]  |  1.02    Ca    8.9      03 Dec 2024 05:33        Urinalysis Basic - ( 03 Dec 2024 05:33 )    Color: x / Appearance: x / SG: x / pH: x  Gluc: 91 mg/dL / Ketone: x  / Bili: x / Urobili: x   Blood: x / Protein: x / Nitrite: x   Leuk Esterase: x / RBC: x / WBC x   Sq Epi: x / Non Sq Epi: x / Bacteria: x          RADIOLOGY/OTHER RESULTS   EXAM:  US DPLX LWR EXT VEINS COMPL BI   ORDERED BY:  SAAD DILL     PROCEDURE DATE:  11/29/2024          INTERPRETATION:  CLINICAL INFORMATION: Monitor soleal DVT.    COMPARISON: Bilateral lower extremity venous Doppler 11/21/2024 and   11/14/2024    TECHNIQUE: Duplex sonography of the BILATERAL LOWER extremity veins with   color and spectral Doppler, with and without compression.    FINDINGS:    RIGHT:  Normal compressibility of the RIGHT common femoral, femoral and popliteal   veins.  Doppler examination shows normal spontaneous and phasic flow.  No RIGHT calf vein thrombosis is detected.    LEFT:  Normal compressibility of the LEFT common femoral, femoral and popliteal   veins.  Doppler examination shows normal spontaneous and phasic flow.  No LEFT calf vein thrombosis is detected.    IMPRESSION:    Deep venous thrombosis in a muscular branch to the right soleus,   unchanged since 11/14/2024.    No evidence of deep venous thrombosis in the left lower extremity.      MEDICATIONS  (STANDING):  aspirin 325 milliGRAM(s) Oral daily  cholecalciferol 1000 Unit(s) Oral daily  clopidogrel Tablet 75 milliGRAM(s) Oral daily  colchicine 0.6 milliGRAM(s) Oral daily  donepezil 5 milliGRAM(s) Oral with breakfast  enoxaparin Injectable 40 milliGRAM(s) SubCutaneous every 24 hours  melatonin 9 milliGRAM(s) Oral at bedtime  pantoprazole    Tablet 40 milliGRAM(s) Oral before breakfast  PARoxetine 30 milliGRAM(s) Oral daily  rosuvastatin 5 milliGRAM(s) Oral daily  tamsulosin 0.4 milliGRAM(s) Oral at bedtime  tiZANidine 2 milliGRAM(s) Oral at bedtime    MEDICATIONS  (PRN):  acetaminophen     Tablet .. 650 milliGRAM(s) Oral every 6 hours PRN Mild Pain (1 - 3)  albuterol    90 MICROgram(s) HFA Inhaler 2 Puff(s) Inhalation every 6 hours PRN Bronchospasm  atropine 1% Ophthalmic Solution for SubLingual Use 1 Drop(s) SubLingual every 8 hours PRN cough, and increased upper airway secretions  bisacodyl Suppository 10 milliGRAM(s) Rectal daily PRN Constipation  polyethylene glycol 3350 17 Gram(s) Oral daily PRN Constipation  senna 2 Tablet(s) Oral at bedtime PRN Constipation  simethicone 80 milliGRAM(s) Chew four times a day PRN Dyspepsia

## 2024-12-04 NOTE — DISCHARGE NOTE NURSING/CASE MANAGEMENT/SOCIAL WORK - FINANCIAL ASSISTANCE
[facility] provides services at a reduced cost to those who are determined to be eligible through [facility]’s financial assistance program. Information regarding [facility]’s financial assistance program can be found by going to [email or web address] or by calling [phone].

## 2024-12-05 VITALS
DIASTOLIC BLOOD PRESSURE: 67 MMHG | HEART RATE: 57 BPM | RESPIRATION RATE: 16 BRPM | OXYGEN SATURATION: 95 % | SYSTOLIC BLOOD PRESSURE: 124 MMHG | TEMPERATURE: 98 F

## 2024-12-05 LAB
ALBUMIN SERPL ELPH-MCNC: 2.8 G/DL — LOW (ref 3.3–5)
ALP SERPL-CCNC: 55 U/L — SIGNIFICANT CHANGE UP (ref 40–120)
ALT FLD-CCNC: 23 U/L — SIGNIFICANT CHANGE UP (ref 10–45)
ANION GAP SERPL CALC-SCNC: 3 MMOL/L — LOW (ref 5–17)
AST SERPL-CCNC: 25 U/L — SIGNIFICANT CHANGE UP (ref 10–40)
BASOPHILS # BLD AUTO: 0.03 K/UL — SIGNIFICANT CHANGE UP (ref 0–0.2)
BASOPHILS NFR BLD AUTO: 0.6 % — SIGNIFICANT CHANGE UP (ref 0–2)
BILIRUB SERPL-MCNC: 0.4 MG/DL — SIGNIFICANT CHANGE UP (ref 0.2–1.2)
BUN SERPL-MCNC: 12 MG/DL — SIGNIFICANT CHANGE UP (ref 7–23)
CALCIUM SERPL-MCNC: 8.7 MG/DL — SIGNIFICANT CHANGE UP (ref 8.4–10.5)
CHLORIDE SERPL-SCNC: 110 MMOL/L — HIGH (ref 96–108)
CO2 SERPL-SCNC: 33 MMOL/L — HIGH (ref 22–31)
CREAT SERPL-MCNC: 0.75 MG/DL — SIGNIFICANT CHANGE UP (ref 0.5–1.3)
EGFR: 97 ML/MIN/1.73M2 — SIGNIFICANT CHANGE UP
EOSINOPHIL # BLD AUTO: 0.35 K/UL — SIGNIFICANT CHANGE UP (ref 0–0.5)
EOSINOPHIL NFR BLD AUTO: 6.8 % — HIGH (ref 0–6)
GLUCOSE SERPL-MCNC: 97 MG/DL — SIGNIFICANT CHANGE UP (ref 70–99)
HCT VFR BLD CALC: 35.5 % — LOW (ref 39–50)
HGB BLD-MCNC: 11.7 G/DL — LOW (ref 13–17)
IMM GRANULOCYTES NFR BLD AUTO: 0.4 % — SIGNIFICANT CHANGE UP (ref 0–0.9)
LYMPHOCYTES # BLD AUTO: 1.48 K/UL — SIGNIFICANT CHANGE UP (ref 1–3.3)
LYMPHOCYTES # BLD AUTO: 28.7 % — SIGNIFICANT CHANGE UP (ref 13–44)
MCHC RBC-ENTMCNC: 30.5 PG — SIGNIFICANT CHANGE UP (ref 27–34)
MCHC RBC-ENTMCNC: 33 G/DL — SIGNIFICANT CHANGE UP (ref 32–36)
MCV RBC AUTO: 92.7 FL — SIGNIFICANT CHANGE UP (ref 80–100)
MONOCYTES # BLD AUTO: 0.45 K/UL — SIGNIFICANT CHANGE UP (ref 0–0.9)
MONOCYTES NFR BLD AUTO: 8.7 % — SIGNIFICANT CHANGE UP (ref 2–14)
NEUTROPHILS # BLD AUTO: 2.82 K/UL — SIGNIFICANT CHANGE UP (ref 1.8–7.4)
NEUTROPHILS NFR BLD AUTO: 54.8 % — SIGNIFICANT CHANGE UP (ref 43–77)
NRBC # BLD: 0 /100 WBCS — SIGNIFICANT CHANGE UP (ref 0–0)
PLATELET # BLD AUTO: 182 K/UL — SIGNIFICANT CHANGE UP (ref 150–400)
POTASSIUM SERPL-MCNC: 3.8 MMOL/L — SIGNIFICANT CHANGE UP (ref 3.5–5.3)
POTASSIUM SERPL-SCNC: 3.8 MMOL/L — SIGNIFICANT CHANGE UP (ref 3.5–5.3)
PROT SERPL-MCNC: 6 G/DL — SIGNIFICANT CHANGE UP (ref 6–8.3)
RBC # BLD: 3.83 M/UL — LOW (ref 4.2–5.8)
RBC # FLD: 12.8 % — SIGNIFICANT CHANGE UP (ref 10.3–14.5)
SODIUM SERPL-SCNC: 146 MMOL/L — HIGH (ref 135–145)
WBC # BLD: 5.15 K/UL — SIGNIFICANT CHANGE UP (ref 3.8–10.5)
WBC # FLD AUTO: 5.15 K/UL — SIGNIFICANT CHANGE UP (ref 3.8–10.5)

## 2024-12-05 PROCEDURE — 99232 SBSQ HOSP IP/OBS MODERATE 35: CPT

## 2024-12-05 PROCEDURE — 97165 OT EVAL LOW COMPLEX 30 MIN: CPT | Mod: GO

## 2024-12-05 PROCEDURE — 84550 ASSAY OF BLOOD/URIC ACID: CPT

## 2024-12-05 PROCEDURE — 87086 URINE CULTURE/COLONY COUNT: CPT

## 2024-12-05 PROCEDURE — 97535 SELF CARE MNGMENT TRAINING: CPT | Mod: GO

## 2024-12-05 PROCEDURE — 36415 COLL VENOUS BLD VENIPUNCTURE: CPT

## 2024-12-05 PROCEDURE — 97112 NEUROMUSCULAR REEDUCATION: CPT | Mod: GO

## 2024-12-05 PROCEDURE — 85025 COMPLETE CBC W/AUTO DIFF WBC: CPT

## 2024-12-05 PROCEDURE — 93970 EXTREMITY STUDY: CPT

## 2024-12-05 PROCEDURE — 80053 COMPREHEN METABOLIC PANEL: CPT

## 2024-12-05 PROCEDURE — 97116 GAIT TRAINING THERAPY: CPT | Mod: GP

## 2024-12-05 PROCEDURE — 74018 RADEX ABDOMEN 1 VIEW: CPT

## 2024-12-05 PROCEDURE — 87186 SC STD MICRODIL/AGAR DIL: CPT

## 2024-12-05 PROCEDURE — 97161 PT EVAL LOW COMPLEX 20 MIN: CPT | Mod: GP

## 2024-12-05 PROCEDURE — 94640 AIRWAY INHALATION TREATMENT: CPT

## 2024-12-05 PROCEDURE — 97110 THERAPEUTIC EXERCISES: CPT | Mod: GP

## 2024-12-05 PROCEDURE — 80048 BASIC METABOLIC PNL TOTAL CA: CPT

## 2024-12-05 PROCEDURE — 92507 TX SP LANG VOICE COMM INDIV: CPT | Mod: GN

## 2024-12-05 PROCEDURE — 93005 ELECTROCARDIOGRAM TRACING: CPT

## 2024-12-05 PROCEDURE — 71045 X-RAY EXAM CHEST 1 VIEW: CPT

## 2024-12-05 PROCEDURE — 74230 X-RAY XM SWLNG FUNCJ C+: CPT

## 2024-12-05 PROCEDURE — 92523 SPEECH SOUND LANG COMPREHEN: CPT | Mod: GN

## 2024-12-05 PROCEDURE — 97542 WHEELCHAIR MNGMENT TRAINING: CPT | Mod: GO

## 2024-12-05 PROCEDURE — 92610 EVALUATE SWALLOWING FUNCTION: CPT | Mod: GN

## 2024-12-05 PROCEDURE — 92611 MOTION FLUOROSCOPY/SWALLOW: CPT | Mod: GN

## 2024-12-05 PROCEDURE — G0103: CPT

## 2024-12-05 PROCEDURE — 97530 THERAPEUTIC ACTIVITIES: CPT | Mod: GP

## 2024-12-05 PROCEDURE — 87077 CULTURE AEROBIC IDENTIFY: CPT

## 2024-12-05 PROCEDURE — 81001 URINALYSIS AUTO W/SCOPE: CPT

## 2024-12-05 PROCEDURE — 70450 CT HEAD/BRAIN W/O DYE: CPT | Mod: MC

## 2024-12-05 PROCEDURE — 87635 SARS-COV-2 COVID-19 AMP PRB: CPT

## 2024-12-05 PROCEDURE — 92526 ORAL FUNCTION THERAPY: CPT | Mod: GN

## 2024-12-05 RX ORDER — MAGNESIUM, ALUMINUM HYDROXIDE 200-225/5
30 SUSPENSION, ORAL (FINAL DOSE FORM) ORAL
Qty: 0 | Refills: 0 | DISCHARGE
Start: 2024-12-05

## 2024-12-05 RX ADMIN — ROSUVASTATIN CALCIUM 5 MILLIGRAM(S): 5 TABLET, FILM COATED ORAL at 11:20

## 2024-12-05 RX ADMIN — Medication 0.6 MILLIGRAM(S): at 11:20

## 2024-12-05 RX ADMIN — Medication 1000 UNIT(S): at 11:20

## 2024-12-05 RX ADMIN — CLOPIDOGREL 75 MILLIGRAM(S): 75 TABLET, FILM COATED ORAL at 11:20

## 2024-12-05 RX ADMIN — Medication 325 MILLIGRAM(S): at 11:20

## 2024-12-05 RX ADMIN — PANTOPRAZOLE SODIUM 40 MILLIGRAM(S): 40 TABLET, DELAYED RELEASE ORAL at 05:37

## 2024-12-05 RX ADMIN — DONEPEZIL HYDROCHLORIDE 5 MILLIGRAM(S): 5 TABLET, FILM COATED ORAL at 07:24

## 2024-12-05 RX ADMIN — PAROXETINE HYDROCHLORIDE HEMIHYDRATE 30 MILLIGRAM(S): 37.5 TABLET, FILM COATED, EXTENDED RELEASE ORAL at 11:19

## 2024-12-05 NOTE — PROGRESS NOTE ADULT - SUBJECTIVE AND OBJECTIVE BOX
Patient is a 69y old  Male who presents with a chief complaint of L MCA infarct w/ hemorrhagic conversion (04 Dec 2024 11:58)      SUBJECTIVE / OVERNIGHT EVENTS:  Pt seen and examined at bedside. No acute events overnight.    Allergies    penicillin (Swelling)    Intolerances        MEDICATIONS  (STANDING):  aspirin 325 milliGRAM(s) Oral daily  cholecalciferol 1000 Unit(s) Oral daily  clopidogrel Tablet 75 milliGRAM(s) Oral daily  colchicine 0.6 milliGRAM(s) Oral daily  donepezil 5 milliGRAM(s) Oral with breakfast  enoxaparin Injectable 40 milliGRAM(s) SubCutaneous every 24 hours  melatonin 9 milliGRAM(s) Oral at bedtime  pantoprazole    Tablet 40 milliGRAM(s) Oral before breakfast  PARoxetine 30 milliGRAM(s) Oral daily  rosuvastatin 5 milliGRAM(s) Oral daily  tamsulosin 0.4 milliGRAM(s) Oral at bedtime  tiZANidine 2 milliGRAM(s) Oral at bedtime    MEDICATIONS  (PRN):  acetaminophen     Tablet .. 650 milliGRAM(s) Oral every 6 hours PRN Mild Pain (1 - 3)  albuterol    90 MICROgram(s) HFA Inhaler 2 Puff(s) Inhalation every 6 hours PRN Bronchospasm  aluminum hydroxide/magnesium hydroxide/simethicone Suspension 30 milliLiter(s) Oral every 6 hours PRN Dyspepsia  atropine 1% Ophthalmic Solution for SubLingual Use 1 Drop(s) SubLingual every 8 hours PRN cough, and increased upper airway secretions  bisacodyl Suppository 10 milliGRAM(s) Rectal daily PRN Constipation  polyethylene glycol 3350 17 Gram(s) Oral daily PRN Constipation  senna 2 Tablet(s) Oral at bedtime PRN Constipation  simethicone 80 milliGRAM(s) Chew four times a day PRN Dyspepsia      Vital Signs Last 24 Hrs  T(C): 36.4 (05 Dec 2024 07:28), Max: 36.6 (04 Dec 2024 19:30)  T(F): 97.6 (05 Dec 2024 07:28), Max: 97.9 (04 Dec 2024 19:30)  HR: 57 (05 Dec 2024 07:28) (57 - 63)  BP: 124/67 (05 Dec 2024 07:28) (124/67 - 124/74)  BP(mean): --  RR: 16 (05 Dec 2024 07:28) (16 - 16)  SpO2: 95% (05 Dec 2024 07:28) (95% - 95%)    Parameters below as of 05 Dec 2024 07:28  Patient On (Oxygen Delivery Method): room air      CAPILLARY BLOOD GLUCOSE        I&O's Summary      PHYSICAL EXAM:  GENERAL: NAD, well-developed  HEAD:  Atraumatic, Normocephalic  NECK: Supple, No JVD  CHEST/LUNG: Clear to auscultation bilaterally; No wheeze, nonlabored breathing  HEART: Regular rate and rhythm; No murmurs, rubs, or gallops  ABDOMEN: Soft, Nontender, Nondistended; Bowel sounds present  EXTREMITIES:  No clubbing, cyanosis, or edema  PSYCH: calm, appropriate mood    LABS:                        11.7   5.15  )-----------( 182      ( 05 Dec 2024 06:00 )             35.5     12-05    146[H]  |  110[H]  |  12  ----------------------------<  97  3.8   |  33[H]  |  0.75    Ca    8.7      05 Dec 2024 06:00    TPro  6.0  /  Alb  2.8[L]  /  TBili  0.4  /  DBili  x   /  AST  25  /  ALT  23  /  AlkPhos  55  12-05          Urinalysis Basic - ( 05 Dec 2024 06:00 )    Color: x / Appearance: x / SG: x / pH: x  Gluc: 97 mg/dL / Ketone: x  / Bili: x / Urobili: x   Blood: x / Protein: x / Nitrite: x   Leuk Esterase: x / RBC: x / WBC x   Sq Epi: x / Non Sq Epi: x / Bacteria: x        RADIOLOGY & ADDITIONAL TESTS:  Results Reviewed:   Imaging Personally Reviewed:  Electrocardiogram Personally Reviewed:    COORDINATION OF CARE:  Care Discussed with Consultants/Other Providers [Y/N]:  Prior or Outpatient Records Reviewed [Y/N]:

## 2024-12-05 NOTE — PROGRESS NOTE ADULT - ATTENDING COMMENTS
Pt. seen this AM.  Agree with documentation above as per resident with amendments made as appropriate. Patient medically stable. Making progress towards rehab goals.       Left MCA infarct--   Urinary Retention, unimproved  #Constipation, resolving  -continue bladder scans  -UA without gross contamination, but per hospitalist will start cipro 11/18 empirically and send urine culture.    -KUB 11/17: Nonobstructed bowel gas pattern. Enteric contrast throughout the colon with moderately large stool burden.  -LBM recorded 11/17  -KUB ordered 11/18, follow results  -flomax 0.4 initiated daily 11/18  -required straight cath today, 720 CC removed in the morning,   --Will cont. to monitor--  consider placing jerome catheter  -CTM constipation, dulcolax suppository PRN ordered.     UTI--  -- u/a +  -- on cipro, discontinued 11/20  - 11/20 started macrobid due to sensitivities  -- urine Cx +Enteroccocus  --may likely be contributing to  retention  -- Provider to RN order written 11/20 for PVRs to be collected if any spontaneous voiding occurs.    Aphasia  --discussed option of Donepezil or namenda with pt's wife and risks and benefits-- She would like to research meds and get back to me.     ** Spoke with pt's  _wife, Josie___, to provide update on pt's status,  medical update, medications, progress in therapy, ELOS and discharge needs/expectations.   All questions answered.
Pt. seen with resident & fellow.  Agree with documentation above as per fellow with amendments made as appropriate. Patient medically stable. Making progress towards rehab goals.     Left MCA infarct  Urinary Retention, unimproved  #Constipation, resolving  -continue bladder scans  -UA without gross contamination, but per hospitalist will start cipro 11/18 empirically and send urine culture.    -KUB 11/17: Nonobstructed bowel gas pattern. Enteric contrast throughout the colon with moderately large stool burden.  -LBM recorded 11/17  -KUB ordered 11/18, follow results  -flomax 0.4 initiated daily 11/18  -CTM constipation    SOB-- d/w Dr. Rsuh-- cont. O2 via NC  CPAP use as per patient desire, as unable to pull off mask, may benefit from only n/c o2 QHS  aspiration precautions
Pt. seen with resident.  Agree with documentation above as per resident with amendments made as appropriate. Patient medically stable. Making progress towards rehab goals.     left MCA infarct  Patient medically stable for discharge to [facility] today.  Discharge medications and instructions reviewed
Seen this AM with resident.   Agree with documentation as above.  amended where appropriate.
Pt. seen with resident & fellow.  Agree with documentation above as per fellow with amendments made as appropriate. Patient medically stable. Making progress towards rehab goals.     Left MCA infarct   UTI with microscopic hematuria  - patient with dark, brown colored urine noted yesterday  - possible infection, dehydration  - CK =60 11/16 BUn/Cr 13/0.6 11/16  - WBC 12.91 11/16 ---> wbc wnl 11/18  - UA +turbid +nitrite mod LE mod blood many bacteria 20 WBC  - monitor H/H as on DAPT. Hgb stable 11/18  -  on CTX 11/16, day #4 11/19  - urine Cx-- + E. coli and Strep;   sensitive to Ceftriaxone  - IVF 500cc bolus and then 60cc for remainder 500cc, total given 1L. 11/18    Tachycardia--  --discussed with Dr. Donovan, hospitalist  --Ordered LE doppler and CTA   --LE doppler + right soleal DVT-- cont. Lovenox,  f/u doppler next week  -- CTA neg for PE    # Hypokalemia   - K+ 3.4 11/14   - IV potassium 11/14  = K+ 3.5 11/18    # DM Type II  -c/w ISS   - c/w home metformin after discharge from rehab     # HTN  - c/w losartan 50mg PO QD  - BP elevated this AM but improved later-- d/w hospitalist-- cont to monitor closely
Pt. seen with resident.  Agree with documentation above as per resident with amendments made as appropriate. Patient medically stable. Making progress towards rehab goals.     left MCA infarct  R Soleal Vein DVT  - Duplex U/S showed DVT below the knee, R soleal vein thrombosis 11/14  - Lovenox 40 mg SubQ daily 11/15  - f/u weekly dopplers, ordered for 11/21.--shows Persistent right soleal vein DVT.      #Urinary Retention, unimproved  #Constipation, resolving  -continue bladder scans  -UA without gross contamination, but per hospitalist will start cipro 11/18 empirically and send urine culture.  UCx resulted with cipro resistance, so macrobid started. Last day 11/27.   -KUB 11/17: Nonobstructed bowel gas pattern. Enteric contrast throughout the colon with moderately large stool burden.  -LBM recorded 11/17  -KUB ordered 11/18, follow results  -flomax 0.4 initiated daily 11/18  -required straight cath today, 600 CC removed in the morning,   --Will cont. to monitor-  -CTM constipation, dulcolax suppository PRN ordered.     UTI--  -- u/a +  -- on cipro, discontinued 11/20  - 11/20 started macrobid due to sensitivities. Last day 11/27  -- urine Cx +Enteroccocus  --may likely be contributing to  retention
Pt. seen with student.  Agree with documentation above as per student with amendments made as appropriate. Patient medically stable. Making progress towards rehab goals.     Left MCA infarct  R Soleal Vein DVT  - Duplex U/S showed DVT below the knee, R soleal vein thrombosis 11/14  - Aspirin 325 mg daily  - Plavix 75 mg daily  - Lovenox 40 mg SubQ daily 11/15  --f/u weekly dopplers  --Spoke with pt's wife regarding DVT, f/u CT head from 11/14 and Lovenox treatment    c/o  insomnia-- scheduled melatonin 6mg qhs--     Frustrated/depression-- monitor-- will consider neuropsychology referral    ** Spoke with pt's  __wife, __, to provide update on pt's history,  status,  medical update, rehab plan of care and ELOS.  All questions answered.

## 2024-12-05 NOTE — PROGRESS NOTE ADULT - ASSESSMENT
68 y/o M w/ PMHx HLD, depression, GERD, gout, colonic polyps,  congenital absence of one kidney, recent cholecystitis s/p cholecystectomy (6/24), RHETT on CPAP orginially presented to [facility] on 11/3 with aphasia, R sided weakness and R facial droop and vigorous cough x 2 weeks s/p COVID vaccine. 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  S/p L ICA terminus occulusion a/p L ICA thrombectomy, no stent, 5 passes TICI 2C  w/ underling L petrous ICA dissection on 11/3 w/ Dr. March. Now admitted to [facility] for functional deficits for initiation of a multidisciplinary rehab program     #L MCA stroke w/ hemorrhagic conversion  #L petrous ICA dissection w/ pesudoaneurysm  -CTH 11/3: hyperdense appearance of L basal ganglia and L frontal lobe gyri, mild LCA edema 2/2 recent ischemia  -CTA H/N 11/3: no evidence of significant stenosis affecting the extra cranial carotid or vertebral arteries. No evidence of aneurysm or significant vascular stenosis at [place] of [place]  -Repeat CTH 11/4: post recent endovascular revascularization of the left internal carotid artery and left middle cerebral artery. Minimally less conspicuous hyperdense appearance of left basal ganglia and left frontal lobe gyri which may represent expected evolution of contrast staining  -MRI brain 11/5: evolving recent infarct in L ICA associated with hemorrhagic transformation of the L basal ganglia. Effacement of cerebral sulci with mass effect causing 0.6 mm rightward shift.   -CTH 11/9: evolving acute to subacute left MCA infarct with evolving left basal ganglia hemorrhagic transformation, new punctuate foci of increased hyperdensity. Stable mass effect and 5mm rightward midline shift  -CTA 11/9: stable left internal carotid artery pseudoaneurysm immediately inferior to the petrous portion of the L ICA at the area of focal dissection.  -S/p L ICA terminus occulusion a/p L ICA thrombectomy, no stent, 5 passes TICI 2C  w/ underling L petrous ICA dissection on 11/3 w/ Dr. March.   -Will be on DAPT (ASA/Plavix) for 3 months and follow up with outpt neurology  -Statin  -F/u outpatient cardiology for Zio-patch to r/o cardioembolic source --ILR to be considered as outpatient    #Urinary Retention  #Elevated PSA  - pt will need o/p F/U with Urology  - continue flomax    # Normocytic anemia (stable)   -Monitor H/H (12.2/37.2- on 11/14), most recent Hb 11.6  -F/u outpatient colonoscopy for hx of colon polps  -Transfuse <7 PRN    -Iron studies: Transferrin: 11, TIBC: 170, Iron: 18, Ferritin: 414    # Primary HTN   - BP has been stable off (D/Cd Losartan)  - continue to monitor can add back ARB low dose if BP becomes elevated persistently    #RHETT  # Hypoxemia  -CPAP HS  -Albuterol PRN  -On Flovent at home (not available here)  - weaned off Oxygen and saturating well on RA  - pulmonary consult reviewed  - Incentive spirometry    #Gout  -Colchicine 0.6mg daily  -Uric acid 11/12- 6.8     # R Soleal Vein DVT  - Duplex U/S showed DVT below the knee, R soleal vein thrombosis 11/14  - VTE ppx for now  - Serial Dopplers    #Depression  -Paxil 30mg daily     # Hypernatremia  - resolved  - continue to monitor

## 2024-12-05 NOTE — PROGRESS NOTE ADULT - REASON FOR ADMISSION
L MCA infarct w/ hemorrhagic conversion

## 2024-12-05 NOTE — PROGRESS NOTE ADULT - PROVIDER SPECIALTY LIST ADULT
Brain Injury Medicine
Brain Injury Medicine
Hospitalist
Physiatry
Rehab Medicine
Rehab Medicine
Brain Injury Medicine
Hospitalist
Pulmonology
Pulmonology
Rehab Medicine
Brain Injury Medicine
Brain Injury Medicine
Hospitalist
Neuropsychology
Physiatry
Physiatry
Pulmonology
Rehab Medicine
Rehab Medicine
Brain Injury Medicine
Hospitalist
Physiatry
Pulmonology
Brain Injury Medicine
Brain Injury Medicine
Hospitalist
Hospitalist
Physiatry
Hospitalist
Hospitalist
Physiatry

## 2024-12-05 NOTE — PROGRESS NOTE ADULT - SUBJECTIVE AND OBJECTIVE BOX
HPI:  70 y/o M w/ PMHx HLD, depression, GERD, gout, colonic polyps,  congenital absence of one kidney, recent cholecystitis s/p cholecystectomy (6/24), RHETT on CPAP orginially presented to [facility] on 11/3 with aphasia, R sided weakness and R facial droop and vigorous cough x 2 weeks s/p COVID vaccine. LKW 2045 on 11/2. 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 Patient transferred to [facility] for mechanical thrombectomy. S/p L ICA terminus occulusion a/p L ICA thrombectomy, no stent, 5 passes TICI 2C  w/ underling L petrous ICA dissection on 11/3 w/ Dr. March. Post op course c/b slight pseudoaneurysm enlargement w/ dissection distal embolisim into the terminal ICA seen on 11/4 CTA and hemorrhagic transformation of L MCA infarct.   Of note, hospital course also significant for fevers, suspect aspiration pneumonitis treated with Rocephin x 1, and dysphagia requiring easy to chew,  with mod thick liquids. EP was consulted with concerns of cardioembolic source for infarct, recommended to f/u outpatient for Zio patch.   Patient had an MBS on 11/12 which showed moderate oropharyngeal dysphagia, remarkable for delayed/reduced laryngeal vestibular closure, pharyngeal trigger delay and reduced hyolaryngeal elevation/excursion.  There was deep laryngeal penetration and possibly aspiration of thin liquid.  Patient was cleared for easy to chew GI diet with mildly thick liquids.    Patient optimized and was evaluated by PM&R and therapy for functional deficits, gait/ADL impairments and acute rehabilitation was recommended. Patient was cleared for discharge to [facility] IRU on 11/13/24.  (13 Nov 2024 15:44)          Subjective:  Seen this AM in bed.  Pt. able to provide nonverbal communication with thumbs up and down. Patient aware of discharge to [facility] today. Denies pain, nausea. Able to sleep well overnight.  No new complaints.  Patient able to express happiness with upgrade in diet to thin liquids without cup or chin tuck technique.      Vital Signs Last 24 Hrs  T(C): 36.4 (05 Dec 2024 07:28), Max: 36.6 (04 Dec 2024 19:30)  T(F): 97.6 (05 Dec 2024 07:28), Max: 97.9 (04 Dec 2024 19:30)  HR: 57 (05 Dec 2024 07:28) (57 - 63)  BP: 124/67 (05 Dec 2024 07:28) (124/67 - 124/74)  BP(mean): --  RR: 16 (05 Dec 2024 07:28) (16 - 16)  SpO2: 95% (05 Dec 2024 07:28) (95% - 95%)    Parameters below as of 05 Dec 2024 07:28  Patient On (Oxygen Delivery Method): room air          REVIEW OF SYMPTOMS  Neurological deficits      PHYSICAL EXAM  Constitutional - NAD, Comfortable   HEENT - NCAT, EOMI   Chest - good chest expansion, good respiratory effort,   Cardio - warm and well perfused,   Abdomen -  Soft, NTND   Extremities - No peripheral edema, No calf tenderness      Neurologic Exam:                       Cognitive -              Orientation: Awake, Alert, Orientation limited due to aphasia            Attention:  limited due to aphasia; can follow simple  commands             Memory: unable to fully test due to aphasia     Speech – + Aphasia--Nonfluent, Impaired multiple step command following, but able to follow all simple commands, impaired repetition; + dysarthria,  able to verbalize some words.       Cranial Nerves – PERRLA , EOMI, + left facial weakness; facial sensation impaired on right, + dysarthria, + dysphagia, Shoulder shrug impaired        Motor -                        LEFT    UE - ShAB 5/5, EF 5/5, EE 5/5, WE 5/5,  5/5                     RIGHT UE - ShAB 0/5, EF 0/5, EE 0/5, WE 0/5,  0/5                     LEFT    LE - HF 5/5, KE 5/5, DF 5/5, PF 5/5                     RIGHT LE - HF 0/5, KE 0/5, DF 0/5, PF 0/5        Coordination - FTN/HTS intact on the left; unable to assess due to weakness on the right     Sensory – Impaired to LT  on right side;--     Psychiatric - Mood stable, Affect WNL     RECENT LABS                        11.7   5.15  )-----------( 182      ( 05 Dec 2024 06:00 )             35.5     12-05    146[H]  |  110[H]  |  12  ----------------------------<  97  3.8   |  33[H]  |  0.75    Ca    8.7      05 Dec 2024 06:00    TPro  6.0  /  Alb  2.8[L]  /  TBili  0.4  /  DBili  x   /  AST  25  /  ALT  23  /  AlkPhos  55  12-05        RADIOLOGY/OTHER RESULTS   EXAM:  US DPLX LWR EXT VEINS COMPL BI   ORDERED BY:  SAAD DILL     PROCEDURE DATE:  11/29/2024          INTERPRETATION:  CLINICAL INFORMATION: Monitor soleal DVT.    COMPARISON: Bilateral lower extremity venous Doppler 11/21/2024 and   11/14/2024    TECHNIQUE: Duplex sonography of the BILATERAL LOWER extremity veins with   color and spectral Doppler, with and without compression.    FINDINGS:    RIGHT:  Normal compressibility of the RIGHT common femoral, femoral and popliteal   veins.  Doppler examination shows normal spontaneous and phasic flow.  No RIGHT calf vein thrombosis is detected.    LEFT:  Normal compressibility of the LEFT common femoral, femoral and popliteal   veins.  Doppler examination shows normal spontaneous and phasic flow.  No LEFT calf vein thrombosis is detected.    IMPRESSION:    Deep venous thrombosis in a muscular branch to the right soleus,   unchanged since 11/14/2024.    No evidence of deep venous thrombosis in the left lower extremity.    MEDICATIONS  (STANDING):  aspirin 325 milliGRAM(s) Oral daily  cholecalciferol 1000 Unit(s) Oral daily  clopidogrel Tablet 75 milliGRAM(s) Oral daily  colchicine 0.6 milliGRAM(s) Oral daily  donepezil 5 milliGRAM(s) Oral with breakfast  enoxaparin Injectable 40 milliGRAM(s) SubCutaneous every 24 hours  melatonin 9 milliGRAM(s) Oral at bedtime  pantoprazole    Tablet 40 milliGRAM(s) Oral before breakfast  PARoxetine 30 milliGRAM(s) Oral daily  rosuvastatin 5 milliGRAM(s) Oral daily  tamsulosin 0.4 milliGRAM(s) Oral at bedtime  tiZANidine 2 milliGRAM(s) Oral at bedtime    MEDICATIONS  (PRN):  acetaminophen     Tablet .. 650 milliGRAM(s) Oral every 6 hours PRN Mild Pain (1 - 3)  albuterol    90 MICROgram(s) HFA Inhaler 2 Puff(s) Inhalation every 6 hours PRN Bronchospasm  aluminum hydroxide/magnesium hydroxide/simethicone Suspension 30 milliLiter(s) Oral every 6 hours PRN Dyspepsia  atropine 1% Ophthalmic Solution for SubLingual Use 1 Drop(s) SubLingual every 8 hours PRN cough, and increased upper airway secretions  bisacodyl Suppository 10 milliGRAM(s) Rectal daily PRN Constipation  polyethylene glycol 3350 17 Gram(s) Oral daily PRN Constipation  senna 2 Tablet(s) Oral at bedtime PRN Constipation  simethicone 80 milliGRAM(s) Chew four times a day PRN Dyspepsia           HPI:  70 y/o M w/ PMHx HLD, depression, GERD, gout, colonic polyps,  congenital absence of one kidney, recent cholecystitis s/p cholecystectomy (6/24), RHETT on CPAP orginially presented to [facility] on 11/3 with aphasia, R sided weakness and R facial droop and vigorous cough x 2 weeks s/p COVID vaccine. LKW 2045 on 11/2. 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 [place] [facility] [place]. S/p L ICA terminus occulusion a/p L ICA thrombectomy, no stent, 5 passes TICI 2C  w/ underling L petrous ICA dissection on 11/3 w/ Dr. March. Post op course c/b slight pseudoaneurysm enlargement w/ dissection distal embolisim into the terminal ICA seen on 11/4 CTA and hemorrhagic transformation of L MCA infarct.   Of note, hospital course also significant for fevers, suspect aspiration pneumonitis treated with Rocephin x 1, and dysphagia requiring easy to chew,  with mod thick liquids. EP was consulted with concerns of cardioembolic source for infarct, recommended to f/u outpatient for Zio patch.   Patient had an MBS on 11/12 which showed moderate oropharyngeal dysphagia, remarkable for delayed/reduced laryngeal vestibular closure, pharyngeal trigger delay and reduced hyolaryngeal elevation/excursion.  There was deep laryngeal penetration and possibly aspiration of thin liquid.  Patient was cleared for easy to chew GI diet with mildly thick liquids.    Patient optimized and was evaluated by PM&R and therapy for functional deficits, gait/ADL impairments and acute rehabilitation was recommended. Patient was cleared for discharge to [facility] IRU on 11/13/24.  (13 Nov 2024 15:44)          Subjective:  Seen this AM in bed.  Pt. able to provide nonverbal communication with thumbs up and down. Patient aware of discharge to [facility] today. Denies pain, nausea. Able to sleep well overnight.  No new complaints.  Patient able to express happiness with upgrade in diet to thin liquids without cup or chin tuck technique.  Informed of discharge to [facility] today      Vital Signs Last 24 Hrs  T(C): 36.4 (05 Dec 2024 07:28), Max: 36.6 (04 Dec 2024 19:30)  T(F): 97.6 (05 Dec 2024 07:28), Max: 97.9 (04 Dec 2024 19:30)  HR: 57 (05 Dec 2024 07:28) (57 - 63)  BP: 124/67 (05 Dec 2024 07:28) (124/67 - 124/74)  BP(mean): --  RR: 16 (05 Dec 2024 07:28) (16 - 16)  SpO2: 95% (05 Dec 2024 07:28) (95% - 95%)    Parameters below as of 05 Dec 2024 07:28  Patient On (Oxygen Delivery Method): room air          REVIEW OF SYMPTOMS  Neurological deficits      PHYSICAL EXAM  Constitutional - NAD, Comfortable   HEENT - NCAT, EOMI   Chest - good chest expansion, good respiratory effort,   Cardio - warm and well perfused,   Abdomen -  Soft, NTND   Extremities - No peripheral edema, No calf tenderness      Neurologic Exam:                       Cognitive -              Orientation: Awake, Alert, Orientation limited due to aphasia            Attention:  limited due to aphasia; can follow simple  commands             Memory: unable to fully test due to aphasia     Speech – + Aphasia--Nonfluent, Impaired multiple step command following, but able to follow all simple commands, impaired repetition; + dysarthria,  able to verbalize some words.       Cranial Nerves – PERRLA , EOMI, + left facial weakness; facial sensation impaired on right, + dysarthria, + dysphagia, Shoulder shrug impaired        Motor -                        LEFT    UE - ShAB 5/5, EF 5/5, EE 5/5, WE 5/5,  5/5                     RIGHT UE - ShAB 0/5, EF 0/5, EE 0/5, WE 0/5,  0/5                     LEFT    LE - HF 5/5, KE 5/5, DF 5/5, PF 5/5                     RIGHT LE - HF 0/5, KE 0/5, DF 0/5, PF 0/5        Coordination - FTN/HTS intact on the left; unable to assess due to weakness on the right     Sensory – Impaired to LT  on right side;--     Psychiatric - Mood stable, Affect WNL     RECENT LABS                        11.7   5.15  )-----------( 182      ( 05 Dec 2024 06:00 )             35.5     12-05    146[H]  |  110[H]  |  12  ----------------------------<  97  3.8   |  33[H]  |  0.75    Ca    8.7      05 Dec 2024 06:00    TPro  6.0  /  Alb  2.8[L]  /  TBili  0.4  /  DBili  x   /  AST  25  /  ALT  23  /  AlkPhos  55  12-05        RADIOLOGY/OTHER RESULTS   EXAM:  US DPLX LWR EXT VEINS COMPL BI   ORDERED BY:  SAAD DILL     PROCEDURE DATE:  11/29/2024          INTERPRETATION:  CLINICAL INFORMATION: Monitor soleal DVT.    COMPARISON: Bilateral lower extremity venous Doppler 11/21/2024 and   11/14/2024    TECHNIQUE: Duplex sonography of the BILATERAL LOWER extremity veins with   color and spectral Doppler, with and without compression.    FINDINGS:    RIGHT:  Normal compressibility of the RIGHT common femoral, femoral and popliteal   veins.  Doppler examination shows normal spontaneous and phasic flow.  No RIGHT calf vein thrombosis is detected.    LEFT:  Normal compressibility of the LEFT common femoral, femoral and popliteal   veins.  Doppler examination shows normal spontaneous and phasic flow.  No LEFT calf vein thrombosis is detected.    IMPRESSION:    Deep venous thrombosis in a muscular branch to the right soleus,   unchanged since 11/14/2024.    No evidence of deep venous thrombosis in the left lower extremity.    MEDICATIONS  (STANDING):  aspirin 325 milliGRAM(s) Oral daily  cholecalciferol 1000 Unit(s) Oral daily  clopidogrel Tablet 75 milliGRAM(s) Oral daily  colchicine 0.6 milliGRAM(s) Oral daily  donepezil 5 milliGRAM(s) Oral with breakfast  enoxaparin Injectable 40 milliGRAM(s) SubCutaneous every 24 hours  melatonin 9 milliGRAM(s) Oral at bedtime  pantoprazole    Tablet 40 milliGRAM(s) Oral before breakfast  PARoxetine 30 milliGRAM(s) Oral daily  rosuvastatin 5 milliGRAM(s) Oral daily  tamsulosin 0.4 milliGRAM(s) Oral at bedtime  tiZANidine 2 milliGRAM(s) Oral at bedtime    MEDICATIONS  (PRN):  acetaminophen     Tablet .. 650 milliGRAM(s) Oral every 6 hours PRN Mild Pain (1 - 3)  albuterol    90 MICROgram(s) HFA Inhaler 2 Puff(s) Inhalation every 6 hours PRN Bronchospasm  aluminum hydroxide/magnesium hydroxide/simethicone Suspension 30 milliLiter(s) Oral every 6 hours PRN Dyspepsia  atropine 1% Ophthalmic Solution for SubLingual Use 1 Drop(s) SubLingual every 8 hours PRN cough, and increased upper airway secretions  bisacodyl Suppository 10 milliGRAM(s) Rectal daily PRN Constipation  polyethylene glycol 3350 17 Gram(s) Oral daily PRN Constipation  senna 2 Tablet(s) Oral at bedtime PRN Constipation  simethicone 80 milliGRAM(s) Chew four times a day PRN Dyspepsia

## 2024-12-05 NOTE — PROGRESS NOTE ADULT - ASSESSMENT
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  S/p L ICA terminus occulusion a/p L ICA thrombectomy,  w/ underling L petrous ICA dissection     # L MCA stroke w/ hemorrhagic conversion  # L petrous ICA dissection w/ pesudoaneurysm  - CTH 11/9: evolving acute to subacute left MCA infarct with evolving left basal ganglia hemorrhagic transformation, new punctuate foci of increased hyperdensity. Stable mass effect and 5mm rightward midline shift  - Aspirin 325mg daily  - Plavix 75mg daily   - Rosuvastatin 5mg QD  - outpatient cardiology f/u for w/u r/o cardioembolic source  - continue comprehensive rehab program, 3 hours a day, 5 days a week.    Aphasia  - cont. Aricept 5mg qhs    HTN---  --off all BP meds due to OH    Hypernatremia - resolved  --Na 146 12/2 to 145 on 12/3 and 146 on 12/5  --cont. to monitor    # R Soleal Vein DVT  - Duplex U/S showed DVT below the knee, R soleal vein thrombosis 11/14  - Lovenox 40 mg SubQ daily 11/15  - f/u weekly dopplers, last doppler 11/21 shows Persistent right soleal vein DVT. REPEAT 11/29 performed-- Deep venous thrombosis in a muscular branch to the right soleus, unchanged since 11/14/2024.    #Urinary Retention,--Resolved  - Voiding--with low PVRs <100cc  --has not needed SC for days  -bowels reg.   - cont. flomax 0.4 (11/18)      # Normocytic anemia (stable) vs Iron deficiency anemia   - F/u outpatient colonoscopy for hx of colon polps  - Transfuse <7 PRN    - Iron studies: Transferrin: 11, TIBC: 170, Iron: 18, Ferritin: 414  - CBC 11/29- HgB 10.8  --H&H improved 12/2 to 11.7/35.5 and stable at 11.7/35.5 on 12/5    # RHETT  # Activity-Induced Hypoxia  - CXR- negative   - CPAP HS 5 50% O2. Poor compliance  - O2 via NC PRN  - Albuterol PRN    # Gout  - Colchicine 0.6mg daily  - Uric acid 11/12- 6.8     # Mood/Cognition  # Depression  - Paxil 30mg daily     # Sleep:   -cont. melatonin 9 qhs     # Pain Management:  - Tylenol PRN    # GI/Bowel:  - Senna QHS  - Miralax daily   - Bisacodyl suppository QD PRN  - simethicone for gas pain  - GI ppx: Pantoprazole 40mg daily     # Dysphagia:  - MBS  12/4 is planned - upgraded to regular and thins no longer requires provale cups or chin tuck  - restorative dining        IDT 12/3  RN - voiding but incontinent  SW - condo with wife, 3-4 arely. elevator to 7th floor.  Independent prior, driving and working.  Wife is retired, main support and she drives. TREV options given to family  SLP - regular diet w/ provale cup or chin tuck, MBSS tomorrow, apraxia mod-severe but continues to imrpove, attempts to verbalize needs more  OT - Setup eating and grooming. Tot-A toileting. Ana UBD Mod-A LBD in supine. toileting and toilet transfers are totalA, tub/shoewr maxA, footwear totalA  PT - Max-A BM. Mod/Max for tx. Amb 30ft with lap gait   Goal 1 - use multimodalities to communicate basic wants and needs with staff max cues- progressing.     Goal 2 - transfer OOB with minimal assist- changed to modA and is progressing  New goal: tx OOB w. mod-A.    Barriers - communication, hemiplegia, sensory impairment, aphasia, poor endurance.    admit GC 11/13.    CMG is 12/5.  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  S/p L ICA terminus occulusion a/p L ICA thrombectomy,  w/ underling L petrous ICA dissection     Patient medically stable for discharge to [facility] today.  Discharge medications and instructions reviewed     # L MCA stroke w/ hemorrhagic conversion  # L petrous ICA dissection w/ pesudoaneurysm  - CTH 11/9: evolving acute to subacute left MCA infarct with evolving left basal ganglia hemorrhagic transformation, new punctuate foci of increased hyperdensity. Stable mass effect and 5mm rightward midline shift  - Aspirin 325mg daily  - Plavix 75mg daily   - Rosuvastatin 5mg QD  - outpatient cardiology f/u for w/u r/o cardioembolic source  - continue comprehensive rehab program,--cont. in [facility]    Aphasia  - cont. Aricept 5mg qhs    HTN---  --off all BP meds due to OH    Hypernatremia - resolved  --Na 146 12/2 to 145 on 12/3 and 146 on 12/5  --cont. to monitor    # R Soleal Vein DVT  - Duplex U/S showed DVT below the knee, R soleal vein thrombosis 11/14  - Lovenox 40 mg SubQ daily 11/15  - f/u weekly dopplers, last doppler 11/21 shows Persistent right soleal vein DVT. REPEAT 11/29 performed-- Deep venous thrombosis in a muscular branch to the right soleus, unchanged since 11/14/2024.    #Urinary Retention,--Resolved  - Voiding--with low PVRs <100cc  --has not needed SC for days  -bowels reg.   - cont. flomax 0.4 (11/18)      # Normocytic anemia (stable) vs Iron deficiency anemia   - F/u outpatient colonoscopy for hx of colon polps  - Transfuse <7 PRN    - Iron studies: Transferrin: 11, TIBC: 170, Iron: 18, Ferritin: 414  - CBC 11/29- HgB 10.8  --H&H improved 12/2 to 11.7/35.5 and stable at 11.7/35.5 on 12/5    # RHETT  # Activity-Induced Hypoxia  - CXR- negative   - CPAP HS 5 50% O2. Poor compliance  - O2 via NC PRN  - Albuterol PRN    # Gout  - Colchicine 0.6mg daily  - Uric acid 11/12- 6.8     # Mood/Cognition  # Depression  - Paxil 30mg daily     # Sleep:   -cont. melatonin 9 qhs     # Pain Management:  - Tylenol PRN    # GI/Bowel:  - Senna QHS  - Miralax daily   - Bisacodyl suppository QD PRN  - simethicone for gas pain  - GI ppx: Pantoprazole 40mg daily     # Dysphagia:  - MBS  12/4 is planned - upgraded to regular and thins no longer requires provale cups or chin tuck  - restorative dining        IDT 12/3  RN - voiding but incontinent  SW - condo with wife, 3-4 arely. elevator to 7th floor.  Independent prior, driving and working.  Wife is retired, main support and she drives. TREV options given to family  SLP - regular diet w/ provale cup or chin tuck, MBSS tomorrow, apraxia mod-severe but continues to imrpove, attempts to verbalize needs more  OT - Setup eating and grooming. Tot-A toileting. Ana UBD Mod-A LBD in supine. toileting and toilet transfers are totalA, tub/shoewr maxA, footwear totalA  PT - Max-A BM. Mod/Max for tx. Amb 30ft with lap gait   Goal 1 - use multimodalities to communicate basic wants and needs with staff max cues- progressing.     Goal 2 - transfer OOB with minimal assist- changed to modA and is progressing  New goal: tx OOB w. mod-A.    Barriers - communication, hemiplegia, sensory impairment, aphasia, poor endurance.    admit GC 11/13.    CMG is 12/5.  DC 12/5 to TREV

## 2025-07-21 ENCOUNTER — APPOINTMENT (OUTPATIENT)
Dept: PHYSICAL MEDICINE AND REHAB | Facility: CLINIC | Age: 70
End: 2025-07-21
Payer: MEDICARE

## 2025-07-21 DIAGNOSIS — R29.818 OTHER SYMPTOMS AND SIGNS INVOLVING THE NERVOUS SYSTEM: ICD-10-CM

## 2025-07-21 DIAGNOSIS — G81.11 SPASTIC HEMIPLEGIA AFFECTING RIGHT DOMINANT SIDE: ICD-10-CM

## 2025-07-21 DIAGNOSIS — R47.01 APHASIA: ICD-10-CM

## 2025-07-21 DIAGNOSIS — R26.1 PARALYTIC GAIT: ICD-10-CM

## 2025-07-21 PROCEDURE — 99214 OFFICE O/P EST MOD 30 MIN: CPT

## 2025-07-21 RX ORDER — CIPROFLOXACIN HYDROCHLORIDE 500 MG/1
500 TABLET, FILM COATED ORAL
Qty: 20 | Refills: 0 | Status: ACTIVE | COMMUNITY
Start: 2025-07-02

## 2025-07-21 RX ORDER — ATORVASTATIN CALCIUM 10 MG/1
10 TABLET, FILM COATED ORAL
Qty: 90 | Refills: 0 | Status: ACTIVE | COMMUNITY
Start: 2025-06-03

## 2025-07-21 RX ORDER — BUDESONIDE 0.5 MG/2ML
0.5 INHALANT ORAL
Qty: 120 | Refills: 0 | Status: ACTIVE | COMMUNITY
Start: 2025-03-30

## 2025-07-21 RX ORDER — TAMSULOSIN HYDROCHLORIDE 0.4 MG/1
0.4 CAPSULE ORAL
Qty: 90 | Refills: 0 | Status: ACTIVE | COMMUNITY
Start: 2025-06-03

## 2025-07-21 RX ORDER — OMEPRAZOLE 20 MG/1
20 CAPSULE, DELAYED RELEASE ORAL
Qty: 30 | Refills: 0 | Status: ACTIVE | COMMUNITY
Start: 2025-05-29

## 2025-07-21 RX ORDER — COLCHICINE 0.6 MG/1
0.6 TABLET ORAL
Qty: 30 | Refills: 0 | Status: ACTIVE | COMMUNITY
Start: 2025-04-23

## 2025-07-21 RX ORDER — NITROFURANTOIN (MONOHYDRATE/MACROCRYSTALS) 25; 75 MG/1; MG/1
100 CAPSULE ORAL
Qty: 14 | Refills: 0 | Status: ACTIVE | COMMUNITY
Start: 2025-06-02

## 2025-07-21 RX ORDER — BACLOFEN 5 MG/1
5 TABLET ORAL
Qty: 90 | Refills: 0 | Status: ACTIVE | COMMUNITY
Start: 2025-07-08

## 2025-07-21 RX ORDER — PAROXETINE HYDROCHLORIDE 20 MG/1
20 TABLET, FILM COATED ORAL
Qty: 30 | Refills: 0 | Status: ACTIVE | COMMUNITY
Start: 2025-04-23

## 2025-07-21 RX ORDER — CEFPODOXIME PROXETIL 200 MG/1
200 TABLET, FILM COATED ORAL
Qty: 20 | Refills: 0 | Status: COMPLETED | COMMUNITY
Start: 2025-06-17

## 2025-07-21 RX ORDER — LACTULOSE 10 G/15ML
10 SOLUTION ORAL; RECTAL
Qty: 270 | Refills: 0 | Status: ACTIVE | COMMUNITY
Start: 2025-04-11

## 2025-07-21 RX ORDER — TIZANIDINE 2 MG/1
2 TABLET ORAL
Qty: 30 | Refills: 0 | Status: ACTIVE | COMMUNITY
Start: 2025-05-09

## 2025-09-15 ENCOUNTER — APPOINTMENT (OUTPATIENT)
Dept: PHYSICAL MEDICINE AND REHAB | Facility: CLINIC | Age: 70
End: 2025-09-15

## 2025-09-16 ENCOUNTER — APPOINTMENT (OUTPATIENT)
Dept: PHYSICAL MEDICINE AND REHAB | Facility: CLINIC | Age: 70
End: 2025-09-16